# Patient Record
Sex: FEMALE | Race: WHITE | NOT HISPANIC OR LATINO | Employment: OTHER | URBAN - METROPOLITAN AREA
[De-identification: names, ages, dates, MRNs, and addresses within clinical notes are randomized per-mention and may not be internally consistent; named-entity substitution may affect disease eponyms.]

---

## 2018-12-12 ENCOUNTER — HOSPITAL ENCOUNTER (INPATIENT)
Facility: HOSPITAL | Age: 63
LOS: 1 days | Discharge: HOME | DRG: 287 | End: 2018-12-14
Attending: FAMILY MEDICINE | Admitting: INTERNAL MEDICINE
Payer: COMMERCIAL

## 2018-12-12 DIAGNOSIS — R07.9 CHEST PAIN: ICD-10-CM

## 2018-12-12 DIAGNOSIS — R79.89 ELEVATED TROPONIN: Primary | ICD-10-CM

## 2018-12-12 DIAGNOSIS — I42.0 CARDIOMYOPATHY, DILATED: ICD-10-CM

## 2018-12-12 PROBLEM — F31.9 BIPOLAR DISORDER: Chronic | Status: ACTIVE | Noted: 2018-12-12

## 2018-12-12 PROBLEM — E11.9 TYPE 2 DIABETES MELLITUS, WITHOUT LONG-TERM CURRENT USE OF INSULIN: Status: ACTIVE | Noted: 2018-12-12

## 2018-12-12 PROBLEM — E66.2 OBESITY HYPOVENTILATION SYNDROME: Chronic | Status: ACTIVE | Noted: 2018-12-12

## 2018-12-12 LAB
ALBUMIN SERPL BCP-MCNC: 3.7 G/DL
ALP SERPL-CCNC: 96 U/L
ALT SERPL W/O P-5'-P-CCNC: 15 U/L
ANION GAP SERPL CALC-SCNC: 12 MMOL/L
AST SERPL-CCNC: 19 U/L
BASOPHILS # BLD AUTO: 0.03 K/UL
BASOPHILS NFR BLD: 0.3 %
BILIRUB SERPL-MCNC: 0.3 MG/DL
BNP SERPL-MCNC: 45 PG/ML
BUN SERPL-MCNC: 15 MG/DL
CALCIUM SERPL-MCNC: 9.4 MG/DL
CHLORIDE SERPL-SCNC: 107 MMOL/L
CO2 SERPL-SCNC: 20 MMOL/L
CREAT SERPL-MCNC: 0.9 MG/DL
DIFFERENTIAL METHOD: ABNORMAL
EOSINOPHIL # BLD AUTO: 0.2 K/UL
EOSINOPHIL NFR BLD: 2.4 %
ERYTHROCYTE [DISTWIDTH] IN BLOOD BY AUTOMATED COUNT: 13.2 %
EST. GFR  (AFRICAN AMERICAN): >60 ML/MIN/1.73 M^2
EST. GFR  (NON AFRICAN AMERICAN): >60 ML/MIN/1.73 M^2
GLUCOSE SERPL-MCNC: 255 MG/DL
HCT VFR BLD AUTO: 36.8 %
HGB BLD-MCNC: 12.1 G/DL
LYMPHOCYTES # BLD AUTO: 1.7 K/UL
LYMPHOCYTES NFR BLD: 18.7 %
MCH RBC QN AUTO: 25.3 PG
MCHC RBC AUTO-ENTMCNC: 32.9 G/DL
MCV RBC AUTO: 77 FL
MONOCYTES # BLD AUTO: 0.6 K/UL
MONOCYTES NFR BLD: 6.9 %
NEUTROPHILS # BLD AUTO: 6.6 K/UL
NEUTROPHILS NFR BLD: 71.7 %
PLATELET # BLD AUTO: 269 K/UL
PMV BLD AUTO: 9.6 FL
POCT GLUCOSE: 232 MG/DL (ref 70–110)
POTASSIUM SERPL-SCNC: 3.9 MMOL/L
PROT SERPL-MCNC: 6.8 G/DL
RBC # BLD AUTO: 4.78 M/UL
SODIUM SERPL-SCNC: 139 MMOL/L
TROPONIN I SERPL DL<=0.01 NG/ML-MCNC: 0.2 NG/ML
WBC # BLD AUTO: 9.16 K/UL

## 2018-12-12 PROCEDURE — 93005 ELECTROCARDIOGRAM TRACING: CPT

## 2018-12-12 PROCEDURE — 93010 ELECTROCARDIOGRAM REPORT: CPT | Mod: ,,, | Performed by: INTERNAL MEDICINE

## 2018-12-12 PROCEDURE — 96372 THER/PROPH/DIAG INJ SC/IM: CPT

## 2018-12-12 PROCEDURE — 85025 COMPLETE CBC W/AUTO DIFF WBC: CPT

## 2018-12-12 PROCEDURE — 25000003 PHARM REV CODE 250: Performed by: FAMILY MEDICINE

## 2018-12-12 PROCEDURE — G0378 HOSPITAL OBSERVATION PER HR: HCPCS

## 2018-12-12 PROCEDURE — 63600175 PHARM REV CODE 636 W HCPCS: Performed by: PHYSICIAN ASSISTANT

## 2018-12-12 PROCEDURE — 99285 EMERGENCY DEPT VISIT HI MDM: CPT

## 2018-12-12 PROCEDURE — 83880 ASSAY OF NATRIURETIC PEPTIDE: CPT

## 2018-12-12 PROCEDURE — 80053 COMPREHEN METABOLIC PANEL: CPT

## 2018-12-12 PROCEDURE — 36415 COLL VENOUS BLD VENIPUNCTURE: CPT

## 2018-12-12 PROCEDURE — 83036 HEMOGLOBIN GLYCOSYLATED A1C: CPT

## 2018-12-12 PROCEDURE — 84484 ASSAY OF TROPONIN QUANT: CPT

## 2018-12-12 PROCEDURE — 25000003 PHARM REV CODE 250: Performed by: PHYSICIAN ASSISTANT

## 2018-12-12 RX ORDER — BUSPIRONE HYDROCHLORIDE 10 MG/1
10 TABLET ORAL 2 TIMES DAILY
COMMUNITY
Start: 2017-12-15

## 2018-12-12 RX ORDER — CARBAMAZEPINE 200 MG/1
200 TABLET ORAL NIGHTLY
COMMUNITY
Start: 2017-06-19

## 2018-12-12 RX ORDER — IBUPROFEN 200 MG
24 TABLET ORAL
Status: DISCONTINUED | OUTPATIENT
Start: 2018-12-12 | End: 2018-12-14 | Stop reason: HOSPADM

## 2018-12-12 RX ORDER — EZETIMIBE 10 MG/1
10 TABLET ORAL DAILY
Status: DISCONTINUED | OUTPATIENT
Start: 2018-12-13 | End: 2018-12-14 | Stop reason: HOSPADM

## 2018-12-12 RX ORDER — VITAMIN B COMPLEX
2500 TABLET ORAL DAILY
COMMUNITY

## 2018-12-12 RX ORDER — ONDANSETRON 8 MG/1
8 TABLET, ORALLY DISINTEGRATING ORAL EVERY 8 HOURS PRN
Status: DISCONTINUED | OUTPATIENT
Start: 2018-12-12 | End: 2018-12-14 | Stop reason: HOSPADM

## 2018-12-12 RX ORDER — IBUPROFEN 200 MG
16 TABLET ORAL
Status: DISCONTINUED | OUTPATIENT
Start: 2018-12-12 | End: 2018-12-14 | Stop reason: HOSPADM

## 2018-12-12 RX ORDER — ASPIRIN 81 MG/1
81 TABLET ORAL DAILY
Status: DISCONTINUED | OUTPATIENT
Start: 2018-12-12 | End: 2018-12-14 | Stop reason: HOSPADM

## 2018-12-12 RX ORDER — BUSPIRONE HYDROCHLORIDE 10 MG/1
10 TABLET ORAL 2 TIMES DAILY
Status: DISCONTINUED | OUTPATIENT
Start: 2018-12-12 | End: 2018-12-14 | Stop reason: HOSPADM

## 2018-12-12 RX ORDER — EZETIMIBE 10 MG/1
10 TABLET ORAL DAILY
COMMUNITY
Start: 2018-06-27 | End: 2019-01-08 | Stop reason: SDUPTHER

## 2018-12-12 RX ORDER — ATORVASTATIN CALCIUM 10 MG/1
10 TABLET, FILM COATED ORAL NIGHTLY
Status: DISCONTINUED | OUTPATIENT
Start: 2018-12-12 | End: 2018-12-13

## 2018-12-12 RX ORDER — GLUCAGON 1 MG
1 KIT INJECTION
Status: DISCONTINUED | OUTPATIENT
Start: 2018-12-12 | End: 2018-12-14 | Stop reason: HOSPADM

## 2018-12-12 RX ORDER — ACETAMINOPHEN 325 MG/1
650 TABLET ORAL EVERY 6 HOURS PRN
Status: DISCONTINUED | OUTPATIENT
Start: 2018-12-12 | End: 2018-12-14

## 2018-12-12 RX ORDER — HYDROCODONE BITARTRATE AND ACETAMINOPHEN 5; 325 MG/1; MG/1
1 TABLET ORAL EVERY 6 HOURS PRN
Status: DISCONTINUED | OUTPATIENT
Start: 2018-12-12 | End: 2018-12-14

## 2018-12-12 RX ORDER — INSULIN ASPART 100 [IU]/ML
0-5 INJECTION, SOLUTION INTRAVENOUS; SUBCUTANEOUS
Status: DISCONTINUED | OUTPATIENT
Start: 2018-12-12 | End: 2018-12-14 | Stop reason: HOSPADM

## 2018-12-12 RX ORDER — METOPROLOL TARTRATE 25 MG/1
12.5 TABLET ORAL 2 TIMES DAILY
Status: DISCONTINUED | OUTPATIENT
Start: 2018-12-12 | End: 2018-12-14 | Stop reason: HOSPADM

## 2018-12-12 RX ORDER — CARBAMAZEPINE 200 MG/1
200 TABLET ORAL NIGHTLY
Status: DISCONTINUED | OUTPATIENT
Start: 2018-12-12 | End: 2018-12-14 | Stop reason: HOSPADM

## 2018-12-12 RX ADMIN — BUSPIRONE HYDROCHLORIDE 10 MG: 10 TABLET ORAL at 09:12

## 2018-12-12 RX ADMIN — METOPROLOL TARTRATE 12.5 MG: 25 TABLET, FILM COATED ORAL at 09:12

## 2018-12-12 RX ADMIN — CARBAMAZEPINE 200 MG: 200 TABLET ORAL at 09:12

## 2018-12-12 RX ADMIN — LIDOCAINE HYDROCHLORIDE 50 ML: 20 SOLUTION ORAL; TOPICAL at 01:12

## 2018-12-12 RX ADMIN — ASPIRIN 81 MG: 81 TABLET, COATED ORAL at 06:12

## 2018-12-12 RX ADMIN — INSULIN ASPART 1 UNITS: 100 INJECTION, SOLUTION INTRAVENOUS; SUBCUTANEOUS at 09:12

## 2018-12-12 RX ADMIN — ATORVASTATIN CALCIUM 10 MG: 10 TABLET, FILM COATED ORAL at 09:12

## 2018-12-12 RX ADMIN — APIXABAN 5 MG: 2.5 TABLET, FILM COATED ORAL at 09:12

## 2018-12-12 NOTE — ASSESSMENT & PLAN NOTE
-Possibly due to increased oxygen demand and chronic hypoxia.    -Trend troponin.  -Check 2D echo.  -Start ASA, statin and metoprolol.  -Consider ACE, if blood pressure allows.    -Cardiology consult.

## 2018-12-12 NOTE — ASSESSMENT & PLAN NOTE
-Likely the cause of patient's chronic hypoxia.   -Oxygen as needed.   -Outpatient Pulmonology follow up.   -Educated on the importance of proper nutrition and caloric intake and the detrimental side effects of morbid obesity.

## 2018-12-12 NOTE — ED PROVIDER NOTES
SCRIBE #1 NOTE: I, Carolann Persaud, am scribing for, and in the presence of, Daiana Izaguirre MD. I have scribed the entire note.       History     Chief Complaint   Patient presents with    Chest Pain     pt c/o chest pain that started about an hour ago that radiates to her back     Review of patient's allergies indicates:  No Known Allergies      History of Present Illness     HPI    12/12/2018, 12:43 PM  History obtained from the patient      History of Present Illness: Keysha Dewey is a 63 y.o. female patient with a PMHx of Afib and HTN who presents to the Emergency Department for evaluation of CP which onset gradually 1 hr ago. Pt states pain radiates to back. Describes pain as squeezing. Pt reports sxs onset after getting upset with her sister. Symptoms are constant and moderate in severity. No mitigating or exacerbating factors reported. Associated sxs include SOB. Patient denies any fever, chills, diaphoresis, palpitations, extremity weakness/numbness, leg swelling, dizziness, cough, n/v, abd pain, and all other sxs at this time. No prior Tx. Pt reports normal stress test in August. No further complaints or concerns at this time.         Arrival mode: Personal vehicle     PCP: Primary Doctor No     Past Medical History:  History reviewed. No pertinent medical history.    Past Surgical History:  History reviewed. No pertinent surgical history.    Family History:  History reviewed. No pertinent family history.    Social History:  Social History Main Topics    Smoking status: Unknown if ever smoked    Smokeless tobacco: Unknown if ever used    Alcohol Use: Unknown drinking history    Drug Use: Unknown if ever used    Sexual Activity: Unknown          Review of Systems     Review of Systems   Constitutional: Negative for chills, diaphoresis and fever.   HENT: Negative for sore throat.    Respiratory: Positive for shortness of breath. Negative for cough.    Cardiovascular: Positive for chest pain (radiates  "to back). Negative for palpitations and leg swelling.   Gastrointestinal: Negative for abdominal pain, nausea and vomiting.   Genitourinary: Negative for dysuria.   Skin: Negative for rash.   Neurological: Negative for dizziness, weakness and numbness.   Hematological: Does not bruise/bleed easily.   All other systems reviewed and are negative.       Physical Exam     Initial Vitals [12/12/18 1233]   BP Pulse Resp Temp SpO2   (!) 157/96 (!) 114 18 98 °F (36.7 °C) 97 %      MAP       --          Physical Exam  Nursing Notes and Vital Signs Reviewed.  Constitutional: Patient is in no acute distress. Well-developed and well-nourished.  Head: Atraumatic. Normocephalic.  Eyes: PERRL. EOM intact. Conjunctivae are not pale. No scleral icterus.  ENT: Mucous membranes are moist. Oropharynx is clear and symmetric.    Neck: Supple. Full ROM. No lymphadenopathy.  Cardiovascular: Regular rate. Regular rhythm. No murmurs, rubs, or gallops. Distal pulses are 2+ and symmetric.  Pulmonary/Chest: No respiratory distress. Clear to auscultation bilaterally. No wheezing or rales.  Abdominal: Soft and non-distended.  There is no tenderness.  No rebound, guarding, or rigidity. Good bowel sounds.  Genitourinary: No CVA tenderness  Musculoskeletal: Moves all extremities. No obvious deformities. No edema. No calf tenderness.  Skin: Warm and dry.  Neurological:  Alert, awake, and appropriate.  Normal speech.  No acute focal neurological deficits are appreciated.  Psychiatric: Normal affect. Good eye contact. Appropriate in content.     ED Course   Procedures  ED Vital Signs:  Vitals:    12/12/18 1233   BP: (!) 157/96   Pulse: (!) 114   Resp: 18   Temp: 98 °F (36.7 °C)   TempSrc: Oral   SpO2: 97%   Weight: 114.5 kg (252 lb 8.6 oz)   Height: 5' 4" (1.626 m)       Abnormal Lab Results:  Labs Reviewed   CBC W/ AUTO DIFFERENTIAL - Abnormal; Notable for the following components:       Result Value    Hematocrit 36.8 (*)     MCV 77 (*)     MCH 25.3 " (*)     All other components within normal limits   COMPREHENSIVE METABOLIC PANEL - Abnormal; Notable for the following components:    CO2 20 (*)     Glucose 255 (*)     All other components within normal limits   TROPONIN I - Abnormal; Notable for the following components:    Troponin I 0.202 (*)     All other components within normal limits   B-TYPE NATRIURETIC PEPTIDE   URINALYSIS        All Lab Results:  Results for orders placed or performed during the hospital encounter of 12/12/18   CBC auto differential   Result Value Ref Range    WBC 9.16 3.90 - 12.70 K/uL    RBC 4.78 4.00 - 5.40 M/uL    Hemoglobin 12.1 12.0 - 16.0 g/dL    Hematocrit 36.8 (L) 37.0 - 48.5 %    MCV 77 (L) 82 - 98 fL    MCH 25.3 (L) 27.0 - 31.0 pg    MCHC 32.9 32.0 - 36.0 g/dL    RDW 13.2 11.5 - 14.5 %    Platelets 269 150 - 350 K/uL    MPV 9.6 9.2 - 12.9 fL    Gran # (ANC) 6.6 1.8 - 7.7 K/uL    Lymph # 1.7 1.0 - 4.8 K/uL    Mono # 0.6 0.3 - 1.0 K/uL    Eos # 0.2 0.0 - 0.5 K/uL    Baso # 0.03 0.00 - 0.20 K/uL    Gran% 71.7 38.0 - 73.0 %    Lymph% 18.7 18.0 - 48.0 %    Mono% 6.9 4.0 - 15.0 %    Eosinophil% 2.4 0.0 - 8.0 %    Basophil% 0.3 0.0 - 1.9 %    Differential Method Automated    Comprehensive metabolic panel   Result Value Ref Range    Sodium 139 136 - 145 mmol/L    Potassium 3.9 3.5 - 5.1 mmol/L    Chloride 107 95 - 110 mmol/L    CO2 20 (L) 23 - 29 mmol/L    Glucose 255 (H) 70 - 110 mg/dL    BUN, Bld 15 8 - 23 mg/dL    Creatinine 0.9 0.5 - 1.4 mg/dL    Calcium 9.4 8.7 - 10.5 mg/dL    Total Protein 6.8 6.0 - 8.4 g/dL    Albumin 3.7 3.5 - 5.2 g/dL    Total Bilirubin 0.3 0.1 - 1.0 mg/dL    Alkaline Phosphatase 96 55 - 135 U/L    AST 19 10 - 40 U/L    ALT 15 10 - 44 U/L    Anion Gap 12 8 - 16 mmol/L    eGFR if African American >60 >60 mL/min/1.73 m^2    eGFR if non African American >60 >60 mL/min/1.73 m^2   B-Type natriuretic peptide (BNP)   Result Value Ref Range    BNP 45 0 - 99 pg/mL   Troponin I   Result Value Ref Range    Troponin I  0.202 (H) 0.000 - 0.026 ng/mL         Imaging Results:  Imaging Results          X-Ray Chest AP Portable (Final result)  Result time 12/12/18 13:12:22    Final result by WAYNE Flynn Sr., MD (12/12/18 13:12:22)                 Impression:      1. The lungs are clear.  2. There are mild osteoarthritic changes in the right acromioclavicular joint.  .      Electronically signed by: Bharathi Flynn MD  Date:    12/12/2018  Time:    13:12             Narrative:    EXAMINATION:  XR CHEST AP PORTABLE    CLINICAL HISTORY:  Chest Pain;    COMPARISON:  08/17/2016    FINDINGS:  The size of the heart is normal. The lungs are clear. There is no pneumothorax.  The costophrenic angles are sharp.  There are mild osteoarthritic changes in the right acromioclavicular joint.                                 The EKG was ordered, reviewed, and independently interpreted by the ED provider.  Interpretation time: 1244  Rate: 92 BPM   Rhythm: normal sinus rhythm  Interpretation: Low voltage QRS. Borderline ECG. No STEMI.           The Emergency Provider reviewed the vital signs and test results, which are outlined above.     ED Discussion     2:15 PM: Dr. Izaguirre discussed the pt's case with Dr. Smith (Cardiology) who recommends admission to hospital medicine.    2:39 PM: Discussed case with ИВАН Pearce (Hospital Medicine). Dr. Grimaldo agrees with current care and management of pt and accepts admission.   Admitting Service: Hospital medicine   Admitting Physician: Dr. Grimaldo  Admit to: Tele Obs    2:43 PM: Re-evaluated pt. I have discussed test results, shared treatment plan, and the need for admission with patient and family at bedside. Pt and family express understanding at this time and agree with all information. All questions answered. Pt and family have no further questions or concerns at this time. Pt is ready for admit.        ED Medication(s):  Medications   GI cocktail (mylanta 30 mL, lidocaine 2 % viscous 10 mL,  dicyclomine 10 mL) 50 mL (50 mLs Oral Given 12/12/18 4247)            Medical Decision Making:   Clinical Tests:   Lab Tests: Ordered and Reviewed  Radiological Study: Ordered and Reviewed  Medical Tests: Ordered and Reviewed             Scribe Attestation:   Scribe #1: I performed the above scribed service and the documentation accurately describes the services I performed. I attest to the accuracy of the note.     Attending:   Physician Attestation Statement for Scribe #1: I, Daiana Izaguirre MD, personally performed the services described in this documentation, as scribed by Carolann Persaud, in my presence, and it is both accurate and complete.           Clinical Impression       ICD-10-CM ICD-9-CM   1. Chest pain R07.9 786.50       Disposition:   Disposition: Placed in Observation  Condition: Fair         Daiana Izaguirre MD  12/13/18 1007

## 2018-12-12 NOTE — SUBJECTIVE & OBJECTIVE
Past Medical History:   Diagnosis Date    Atrial fibrillation     Bipolar disorder     DM w/o complication type II        Past Surgical History:   Procedure Laterality Date    GASTRIC BYPASS  2006    HYSTERECTOMY  2002       Review of patient's allergies indicates:  No Known Allergies    No current facility-administered medications on file prior to encounter.      Current Outpatient Medications on File Prior to Encounter   Medication Sig    apixaban (ELIQUIS) 5 mg Tab Take 5 mg by mouth 2 (two) times daily.    busPIRone (BUSPAR) 10 MG tablet Take 10 mg by mouth 2 (two) times daily.    carBAMazepine (TEGRETOL) 200 mg tablet Take 200 mg by mouth every evening.    cyanocobalamin, vitamin B-12, (VITAMIN B-12) 2,500 mcg Subl Place 2,500 mcg under the tongue once daily.    ezetimibe (ZETIA) 10 mg tablet Take 10 mg by mouth once daily.    SITagliptin (JANUVIA) 100 MG Tab Take 100 mg by mouth once daily.     Family History     Reviewed and not pertinent.         Tobacco Use    Smoking status: Never Smoker   Substance and Sexual Activity    Alcohol use: No    Drug use: No    Sexual activity: Not on file     Review of Systems   Constitutional: Negative for appetite change, chills, diaphoresis, fatigue and fever.   HENT: Negative for congestion, ear pain, mouth sores, sore throat and trouble swallowing.    Eyes: Negative for pain and visual disturbance.   Respiratory: Positive for shortness of breath. Negative for cough and chest tightness.    Cardiovascular: Positive for chest pain. Negative for palpitations and leg swelling.   Gastrointestinal: Negative for abdominal pain, constipation and nausea.   Endocrine: Negative for cold intolerance, heat intolerance, polydipsia and polyuria.   Genitourinary: Negative for dysuria, frequency and hematuria.   Musculoskeletal: Positive for back pain. Negative for arthralgias, myalgias and neck pain.   Skin: Negative for pallor, rash and wound.   Allergic/Immunologic:  Negative for environmental allergies and immunocompromised state.   Neurological: Negative for dizziness, seizures, syncope, weakness, numbness and headaches.   Hematological: Negative for adenopathy. Does not bruise/bleed easily.   Psychiatric/Behavioral: Negative for agitation, confusion and sleep disturbance.     Objective:     Vital Signs (Most Recent):  Temp: 98 °F (36.7 °C) (12/12/18 1233)  Pulse: 102 (12/12/18 1500)  Resp: 18 (12/12/18 1500)  BP: 131/80 (12/12/18 1500)  SpO2: 98 % (12/12/18 1500) Vital Signs (24h Range):  Temp:  [98 °F (36.7 °C)] 98 °F (36.7 °C)  Pulse:  [] 102  Resp:  [16-18] 18  SpO2:  [95 %-98 %] 98 %  BP: (130-157)/(76-96) 131/80     Weight: 114.5 kg (252 lb 8.6 oz)  Body mass index is 43.35 kg/m².    Physical Exam   Constitutional: She is oriented to person, place, and time. She appears well-developed and well-nourished. No distress.   HENT:   Head: Normocephalic and atraumatic.   Eyes: Conjunctivae are normal.   PERRL   Neck: Neck supple. No JVD present.   Cardiovascular: Normal rate, regular rhythm and normal heart sounds.   Pulmonary/Chest: Effort normal. She has decreased breath sounds in the right lower field and the left lower field.   Abdominal: Soft. Bowel sounds are normal. She exhibits no distension. There is no tenderness.   Musculoskeletal: Normal range of motion. She exhibits edema.   Neurological: She is alert and oriented to person, place, and time.   Skin: Skin is warm and dry.   Psychiatric: She has a normal mood and affect. Her behavior is normal. Thought content normal.   Nursing note and vitals reviewed.          Significant Labs:   CBC:   Recent Labs   Lab 12/12/18  1236   WBC 9.16   HGB 12.1   HCT 36.8*        CMP:   Recent Labs   Lab 12/12/18  1236      K 3.9      CO2 20*   *   BUN 15   CREATININE 0.9   CALCIUM 9.4   PROT 6.8   ALBUMIN 3.7   BILITOT 0.3   ALKPHOS 96   AST 19   ALT 15   ANIONGAP 12   EGFRNONAA >60     All pertinent  labs within the past 24 hours have been reviewed.    Significant Imaging: I have reviewed all pertinent imaging results/findings within the past 24 hours.   Imaging Results          X-Ray Chest AP Portable (Final result)  Result time 12/12/18 13:12:22    Final result by WAYNE Flynn Sr., MD (12/12/18 13:12:22)                 Impression:      1. The lungs are clear.  2. There are mild osteoarthritic changes in the right acromioclavicular joint.  .      Electronically signed by: Bharathi Flynn MD  Date:    12/12/2018  Time:    13:12             Narrative:    EXAMINATION:  XR CHEST AP PORTABLE    CLINICAL HISTORY:  Chest Pain;    COMPARISON:  08/17/2016    FINDINGS:  The size of the heart is normal. The lungs are clear. There is no pneumothorax.  The costophrenic angles are sharp.  There are mild osteoarthritic changes in the right acromioclavicular joint.

## 2018-12-12 NOTE — H&P
[facility] Hospital Medicine  History & Physical    Patient Name: Keysha Dewey  MRN: [id number]  Admission Date: 12/12/2018  Attending Physician: Orion Grimaldo MD   Primary Care Provider: Tito Carbone MD         Patient information was obtained from patient, past medical records and ER records.     Subjective:     Principal Problem:Elevated troponin    Chief Complaint:   Chief Complaint   Patient presents with    Chest Pain     pt c/o chest pain that started about an hour ago that radiates to her back        HPI: Keysha Dewey is a 63 year old female with atrial fibrillation on Eliquis, DM II and bipolar disorder who presented to the ED with complaints of substernal chest pain. The pain began during an argument with her sister. It radiated into her back and both arms became weak.  There was associated SOB. The patient denies nausea and diaphoresis. She denies prior history of chest pain, but sees Dr. Echavarria at  Cardiology. She reports having a negative stress test on 8/29/18. These results were confirmed with obtained records from Dr. Echavarria. Work up in the ED was essentially negative with the exception persistent oxygen saturations in the low 90s.     Past Medical History:   Diagnosis Date    Atrial fibrillation     Bipolar disorder     DM w/o complication type II        Past Surgical History:   Procedure Laterality Date    GASTRIC BYPASS  2006    HYSTERECTOMY  2002       Review of patient's allergies indicates:  No Known Allergies    No current facility-administered medications on file prior to encounter.      Current Outpatient Medications on File Prior to Encounter   Medication Sig    apixaban (ELIQUIS) 5 mg Tab Take 5 mg by mouth 2 (two) times daily.    busPIRone (BUSPAR) 10 MG tablet Take 10 mg by mouth 2 (two) times daily.    carBAMazepine (TEGRETOL) 200 mg tablet Take 200 mg by mouth every evening.    cyanocobalamin, vitamin B-12, (VITAMIN B-12) 2,500 mcg Subl Place 2,500 mcg  under the tongue once daily.    ezetimibe (ZETIA) 10 mg tablet Take 10 mg by mouth once daily.    SITagliptin (JANUVIA) 100 MG Tab Take 100 mg by mouth once daily.     Family History     Reviewed and not pertinent.         Tobacco Use    Smoking status: Never Smoker   Substance and Sexual Activity    Alcohol use: No    Drug use: No    Sexual activity: Not on file     Review of Systems   Constitutional: Negative for appetite change, chills, diaphoresis, fatigue and fever.   HENT: Negative for congestion, ear pain, mouth sores, sore throat and trouble swallowing.    Eyes: Negative for pain and visual disturbance.   Respiratory: Positive for shortness of breath. Negative for cough and chest tightness.    Cardiovascular: Positive for chest pain. Negative for palpitations and leg swelling.   Gastrointestinal: Negative for abdominal pain, constipation and nausea.   Endocrine: Negative for cold intolerance, heat intolerance, polydipsia and polyuria.   Genitourinary: Negative for dysuria, frequency and hematuria.   Musculoskeletal: Positive for back pain. Negative for arthralgias, myalgias and neck pain.   Skin: Negative for pallor, rash and wound.   Allergic/Immunologic: Negative for environmental allergies and immunocompromised state.   Neurological: Negative for dizziness, seizures, syncope, weakness, numbness and headaches.   Hematological: Negative for adenopathy. Does not bruise/bleed easily.   Psychiatric/Behavioral: Negative for agitation, confusion and sleep disturbance.     Objective:     Vital Signs (Most Recent):  Temp: 98 °F (36.7 °C) (12/12/18 1233)  Pulse: 102 (12/12/18 1500)  Resp: 18 (12/12/18 1500)  BP: 131/80 (12/12/18 1500)  SpO2: 98 % (12/12/18 1500) Vital Signs (24h Range):  Temp:  [98 °F (36.7 °C)] 98 °F (36.7 °C)  Pulse:  [] 102  Resp:  [16-18] 18  SpO2:  [95 %-98 %] 98 %  BP: (130-157)/(76-96) 131/80     Weight: 114.5 kg (252 lb 8.6 oz)  Body mass index is 43.35 kg/m².    Physical Exam    Constitutional: She is oriented to person, place, and time. She appears well-developed and well-nourished. No distress.   HENT:   Head: Normocephalic and atraumatic.   Eyes: Conjunctivae are normal.   PERRL   Neck: Neck supple. No JVD present.   Cardiovascular: Normal rate, regular rhythm and normal heart sounds.   Pulmonary/Chest: Effort normal. She has decreased breath sounds in the right lower field and the left lower field.   Abdominal: Soft. Bowel sounds are normal. She exhibits no distension. There is no tenderness.   Musculoskeletal: Normal range of motion. She exhibits edema.   Neurological: She is alert and oriented to person, place, and time.   Skin: Skin is warm and dry.   Psychiatric: She has a normal mood and affect. Her behavior is normal. Thought content normal.   Nursing note and vitals reviewed.          Significant Labs:   CBC:   Recent Labs   Lab 12/12/18  1236   WBC 9.16   HGB 12.1   HCT 36.8*        CMP:   Recent Labs   Lab 12/12/18  1236      K 3.9      CO2 20*   *   BUN 15   CREATININE 0.9   CALCIUM 9.4   PROT 6.8   ALBUMIN 3.7   BILITOT 0.3   ALKPHOS 96   AST 19   ALT 15   ANIONGAP 12   EGFRNONAA >60     All pertinent labs within the past 24 hours have been reviewed.    Significant Imaging: I have reviewed all pertinent imaging results/findings within the past 24 hours.   Imaging Results          X-Ray Chest AP Portable (Final result)  Result time 12/12/18 13:12:22    Final result by WAYNE Flynn Sr., MD (12/12/18 13:12:22)                 Impression:      1. The lungs are clear.  2. There are mild osteoarthritic changes in the right acromioclavicular joint.  .      Electronically signed by: Bharathi Flynn MD  Date:    12/12/2018  Time:    13:12             Narrative:    EXAMINATION:  XR CHEST AP PORTABLE    CLINICAL HISTORY:  Chest Pain;    COMPARISON:  08/17/2016    FINDINGS:  The size of the heart is normal. The lungs are clear. There is no pneumothorax.   The costophrenic angles are sharp.  There are mild osteoarthritic changes in the right acromioclavicular joint.                                  Assessment/Plan:     * Elevated troponin    -Possibly due to increased oxygen demand and chronic hypoxia.    -Trend troponin.  -Check 2D echo.  -Start ASA, statin and metoprolol.  -Consider ACE, if blood pressure allows.    -Cardiology consult.       Obesity hypoventilation syndrome    -Likely the cause of patient's chronic hypoxia.   -Oxygen as needed.   -Outpatient Pulmonology follow up.   -Educated on the importance of proper nutrition and caloric intake and the detrimental side effects of morbid obesity.          Bipolar disorder    Continue buspirone and carbamazepine.          Type 2 diabetes mellitus, without long-term current use of insulin    -NISS  -Hold Januvia  -ADA diet.  -Check Hemoglobin A1C.         VTE Risk Mitigation (From admission, onward)        Ordered     apixaban tablet 5 mg  2 times daily      12/12/18 7887             ИВАН Crawford  Department of Hospital Medicine   [facility]

## 2018-12-13 PROBLEM — I20.0 ANGINA PECTORIS, UNSTABLE: Status: ACTIVE | Noted: 2018-12-13

## 2018-12-13 PROBLEM — R07.9 CHEST PAIN: Status: ACTIVE | Noted: 2018-12-13

## 2018-12-13 LAB
ANION GAP SERPL CALC-SCNC: 8 MMOL/L
ANISOCYTOSIS BLD QL SMEAR: SLIGHT
APTT BLDCRRT: 27.9 SEC
APTT BLDCRRT: 31.4 SEC
BASOPHILS # BLD AUTO: 0.05 K/UL
BASOPHILS NFR BLD: 0.5 %
BUN SERPL-MCNC: 13 MG/DL
CALCIUM SERPL-MCNC: 8.9 MG/DL
CHLORIDE SERPL-SCNC: 111 MMOL/L
CHOLEST SERPL-MCNC: 173 MG/DL
CHOLEST/HDLC SERPL: 4.1 {RATIO}
CO2 SERPL-SCNC: 18 MMOL/L
CREAT SERPL-MCNC: 0.9 MG/DL
DIASTOLIC DYSFUNCTION: YES
DIFFERENTIAL METHOD: ABNORMAL
EOSINOPHIL # BLD AUTO: 0.1 K/UL
EOSINOPHIL NFR BLD: 0.6 %
ERYTHROCYTE [DISTWIDTH] IN BLOOD BY AUTOMATED COUNT: 13.8 %
EST. GFR  (AFRICAN AMERICAN): >60 ML/MIN/1.73 M^2
EST. GFR  (NON AFRICAN AMERICAN): >60 ML/MIN/1.73 M^2
ESTIMATED AVG GLUCOSE: 217 MG/DL
ESTIMATED PA SYSTOLIC PRESSURE: 35.05
GLUCOSE SERPL-MCNC: 237 MG/DL
HBA1C MFR BLD HPLC: 9.2 %
HCT VFR BLD AUTO: 35.2 %
HDLC SERPL-MCNC: 42 MG/DL
HDLC SERPL: 24.3 %
HGB BLD-MCNC: 11 G/DL
INR PPP: 1
LDLC SERPL CALC-MCNC: 100.4 MG/DL
LYMPHOCYTES # BLD AUTO: 1.9 K/UL
LYMPHOCYTES NFR BLD: 18.1 %
MAGNESIUM SERPL-MCNC: 2.1 MG/DL
MCH RBC QN AUTO: 25 PG
MCHC RBC AUTO-ENTMCNC: 31.3 G/DL
MCV RBC AUTO: 80 FL
MONOCYTES # BLD AUTO: 0.5 K/UL
MONOCYTES NFR BLD: 5 %
NEUTROPHILS # BLD AUTO: 7.9 K/UL
NEUTROPHILS NFR BLD: 76.2 %
NONHDLC SERPL-MCNC: 131 MG/DL
OVALOCYTES BLD QL SMEAR: ABNORMAL
PHOSPHATE SERPL-MCNC: 2.9 MG/DL
PLATELET # BLD AUTO: 213 K/UL
PLATELET BLD QL SMEAR: ABNORMAL
PMV BLD AUTO: 9.7 FL
POCT GLUCOSE: 179 MG/DL (ref 70–110)
POCT GLUCOSE: 206 MG/DL (ref 70–110)
POCT GLUCOSE: 237 MG/DL (ref 70–110)
POCT GLUCOSE: 252 MG/DL (ref 70–110)
POIKILOCYTOSIS BLD QL SMEAR: SLIGHT
POTASSIUM SERPL-SCNC: 4.4 MMOL/L
PROTHROMBIN TIME: 10.9 SEC
RBC # BLD AUTO: 4.4 M/UL
RETIRED EF AND QEF - SEE NOTES: 35 (ref 55–65)
SODIUM SERPL-SCNC: 137 MMOL/L
TRIGL SERPL-MCNC: 153 MG/DL
TROPONIN I SERPL DL<=0.01 NG/ML-MCNC: 1.01 NG/ML
TROPONIN I SERPL DL<=0.01 NG/ML-MCNC: 1.09 NG/ML
TROPONIN I SERPL DL<=0.01 NG/ML-MCNC: 1.11 NG/ML
WBC # BLD AUTO: 10.46 K/UL

## 2018-12-13 PROCEDURE — 84484 ASSAY OF TROPONIN QUANT: CPT

## 2018-12-13 PROCEDURE — 80048 BASIC METABOLIC PNL TOTAL CA: CPT

## 2018-12-13 PROCEDURE — 85730 THROMBOPLASTIN TIME PARTIAL: CPT | Mod: 91

## 2018-12-13 PROCEDURE — 36415 COLL VENOUS BLD VENIPUNCTURE: CPT

## 2018-12-13 PROCEDURE — 85025 COMPLETE CBC W/AUTO DIFF WBC: CPT

## 2018-12-13 PROCEDURE — 93306 TTE W/DOPPLER COMPLETE: CPT

## 2018-12-13 PROCEDURE — 83735 ASSAY OF MAGNESIUM: CPT

## 2018-12-13 PROCEDURE — 21400001 HC TELEMETRY ROOM

## 2018-12-13 PROCEDURE — 85730 THROMBOPLASTIN TIME PARTIAL: CPT

## 2018-12-13 PROCEDURE — 96372 THER/PROPH/DIAG INJ SC/IM: CPT

## 2018-12-13 PROCEDURE — 85610 PROTHROMBIN TIME: CPT

## 2018-12-13 PROCEDURE — 99222 1ST HOSP IP/OBS MODERATE 55: CPT | Mod: ,,, | Performed by: INTERNAL MEDICINE

## 2018-12-13 PROCEDURE — 93306 TTE W/DOPPLER COMPLETE: CPT | Mod: 26,,, | Performed by: INTERNAL MEDICINE

## 2018-12-13 PROCEDURE — 84100 ASSAY OF PHOSPHORUS: CPT

## 2018-12-13 PROCEDURE — 63600175 PHARM REV CODE 636 W HCPCS: Performed by: NURSE PRACTITIONER

## 2018-12-13 PROCEDURE — 80061 LIPID PANEL: CPT

## 2018-12-13 PROCEDURE — 25000003 PHARM REV CODE 250: Performed by: PHYSICIAN ASSISTANT

## 2018-12-13 PROCEDURE — 84484 ASSAY OF TROPONIN QUANT: CPT | Mod: 91

## 2018-12-13 RX ORDER — SODIUM CHLORIDE 9 MG/ML
INJECTION, SOLUTION INTRAVENOUS CONTINUOUS
Status: DISCONTINUED | OUTPATIENT
Start: 2018-12-14 | End: 2018-12-14

## 2018-12-13 RX ORDER — HEPARIN SODIUM,PORCINE/D5W 25000/250
12 INTRAVENOUS SOLUTION INTRAVENOUS CONTINUOUS
Status: DISCONTINUED | OUTPATIENT
Start: 2018-12-13 | End: 2018-12-14

## 2018-12-13 RX ADMIN — BUSPIRONE HYDROCHLORIDE 10 MG: 10 TABLET ORAL at 09:12

## 2018-12-13 RX ADMIN — ASPIRIN 81 MG: 81 TABLET, COATED ORAL at 09:12

## 2018-12-13 RX ADMIN — INSULIN ASPART 2 UNITS: 100 INJECTION, SOLUTION INTRAVENOUS; SUBCUTANEOUS at 05:12

## 2018-12-13 RX ADMIN — INSULIN ASPART 1 UNITS: 100 INJECTION, SOLUTION INTRAVENOUS; SUBCUTANEOUS at 08:12

## 2018-12-13 RX ADMIN — METOPROLOL TARTRATE 12.5 MG: 25 TABLET, FILM COATED ORAL at 08:12

## 2018-12-13 RX ADMIN — EZETIMIBE 10 MG: 10 TABLET ORAL at 09:12

## 2018-12-13 RX ADMIN — HEPARIN SODIUM AND DEXTROSE 12 UNITS/KG/HR: 10000; 5 INJECTION INTRAVENOUS at 12:12

## 2018-12-13 RX ADMIN — METOPROLOL TARTRATE 12.5 MG: 25 TABLET, FILM COATED ORAL at 09:12

## 2018-12-13 RX ADMIN — BUSPIRONE HYDROCHLORIDE 10 MG: 10 TABLET ORAL at 08:12

## 2018-12-13 RX ADMIN — CARBAMAZEPINE 200 MG: 200 TABLET ORAL at 08:12

## 2018-12-13 RX ADMIN — APIXABAN 5 MG: 2.5 TABLET, FILM COATED ORAL at 09:12

## 2018-12-13 NOTE — ASSESSMENT & PLAN NOTE
Initial troponin 0.202  Trend troponin 0.202>1.111>1.087  IV heparin gtt started this AM without bolus  Hold Eliquis for now  Continue ASA, IV heparin gtt, Zetia, BB  No ACEi/ARB for now given BP on low side  No statin due to intolerance in the past  FLP pending  TTE pending  Recent stress test negative per Dr. Echavarria, records in file  Further recs to follow

## 2018-12-13 NOTE — ASSESSMENT & PLAN NOTE
-[facility] planned 12/14/18.   -Continue to trend troponin.  -2D echocardiogram results pending.  -Continue ASA and metoprolol.  -Decision regarding addition of second antiplatelet agent pending [facility] results.   -Statin held due to adverse reaction. ACE held due to hypotension.   -The patient was evaluated for cardiac rehab and is not a candidate at this time. She will be referred, if appropriate.     -Cardiology following.

## 2018-12-13 NOTE — CONSULTS
[facility] -   Cardiology  Consult Note    Patient Name: Keysha Dewey  MRN: [id number]  Admission Date: 12/12/2018  Hospital Length of Stay: 0 days  Code Status: Full Code   Attending Provider: Orion Grimaldo MD   Consulting Provider: Ngoc Reno NP  Primary Care Physician: Tito Carbone MD  Principal Problem:Elevated troponin    Patient information was obtained from patient, spouse/SO, past medical records and ER records.     Inpatient consult to Cardiology  Consult performed by: Ngoc Reno NP  Consult ordered by: ИВАН Crawford        Subjective:     Chief Complaint:  Chest pain     HPI:   Mrs. Dewey is a 63 year old female with PMHx of AFIB on Eliquis, HTN, HLP, DM who presented to [facility] yesterday due to chest pain after argument with her sister. She reports that chest pain did radiate to her back and both arms and experienced episode of weakness at that time with associated shortness of breath. Primary cardiologist is Dr. Echavarria at  Cardiology and had recent negative stress test. ED workup revealed decreased O2 saturation in 90s., initial troponin 0.202. Cardiology consulted to assist with management. Patient seen and examined in room this AM. Denies chest pain or anginal equivalents on exam today. No shortness of breath, COHN or palpitations. Denies orthopnea, PND or abdominal bloating. She does endorse having issues with vomiting post food intake and acid reflux since gastric bypass surgery which has been ongoing for some time per patient report. TTE pending. Repeat troponin 1.111, IV heparin gtt started without bolus and Eliquis on hold for now. Will continue to trend serial troponins until peaked. Further recs to follow.    Past Medical History:   Diagnosis Date    Atrial fibrillation     Bipolar disorder     DM w/o complication type II        Past Surgical History:   Procedure Laterality Date    GASTRIC BYPASS  2006    HYSTERECTOMY  2002       Review of patient's  allergies indicates:   Allergen Reactions    Lipitor [atorvastatin]        No current facility-administered medications on file prior to encounter.      Current Outpatient Medications on File Prior to Encounter   Medication Sig    apixaban (ELIQUIS) 5 mg Tab Take 5 mg by mouth 2 (two) times daily.    busPIRone (BUSPAR) 10 MG tablet Take 10 mg by mouth 2 (two) times daily.    carBAMazepine (TEGRETOL) 200 mg tablet Take 200 mg by mouth every evening.    cyanocobalamin, vitamin B-12, (VITAMIN B-12) 2,500 mcg Subl Place 2,500 mcg under the tongue once daily.    ezetimibe (ZETIA) 10 mg tablet Take 10 mg by mouth once daily.    SITagliptin (JANUVIA) 100 MG Tab Take 100 mg by mouth once daily.     Family History     None        Tobacco Use    Smoking status: Never Smoker   Substance and Sexual Activity    Alcohol use: No    Drug use: No    Sexual activity: Not on file     Review of Systems   Constitution: Positive for malaise/fatigue. Negative for weakness.   HENT: Negative for hearing loss and hoarse voice.    Eyes: Negative for blurred vision and visual disturbance.   Cardiovascular: Positive for chest pain (resolved). Negative for claudication, dyspnea on exertion, irregular heartbeat, leg swelling, near-syncope, orthopnea, palpitations, paroxysmal nocturnal dyspnea and syncope.   Respiratory: Positive for shortness of breath (resolved). Negative for cough, hemoptysis, sleep disturbances due to breathing, snoring and wheezing.    Endocrine: Negative for cold intolerance and heat intolerance.   Hematologic/Lymphatic: Bruises/bleeds easily.   Skin: Negative for color change, dry skin and nail changes.   Musculoskeletal: Positive for arthritis. Negative for back pain, joint pain and myalgias.   Gastrointestinal: Positive for vomiting (intermittent post food intake). Negative for bloating, abdominal pain, constipation and nausea.   Genitourinary: Negative for dysuria, flank pain, hematuria and hesitancy.    Neurological: Negative for headaches, light-headedness, loss of balance, numbness and paresthesias.   Psychiatric/Behavioral: Negative for altered mental status.   Allergic/Immunologic: Negative for environmental allergies.     Objective:     Vital Signs (Most Recent):  Temp: 98.6 °F (37 °C) (12/13/18 1208)  Pulse: 87 (12/13/18 1208)  Resp: 18 (12/13/18 1208)  BP: 129/73 (12/13/18 1208)  SpO2: 95 % (12/13/18 1208) Vital Signs (24h Range):  Temp:  [97.7 °F (36.5 °C)-98.6 °F (37 °C)] 98.6 °F (37 °C)  Pulse:  [] 87  Resp:  [16-18] 18  SpO2:  [93 %-98 %] 95 %  BP: (106-131)/(56-80) 129/73     Weight: 114.5 kg (252 lb 6.8 oz)  Body mass index is 43.33 kg/m².    SpO2: 95 %  O2 Device (Oxygen Therapy): room air    No intake or output data in the 24 hours ending 12/13/18 1251    Lines/Drains/Airways     Peripheral Intravenous Line                 Peripheral IV - Single Lumen 12/12/18 1248 Left Antecubital 1 day                Physical Exam   Constitutional: She is oriented to person, place, and time. She appears well-developed and well-nourished. No distress.   HENT:   Head: Normocephalic and atraumatic.   Eyes: Pupils are equal, round, and reactive to light.   Neck: Normal range of motion and full passive range of motion without pain. Neck supple. No JVD present.   Cardiovascular: Normal rate, regular rhythm, S1 normal, S2 normal and intact distal pulses. PMI is not displaced. Exam reveals no distant heart sounds.   No murmur heard.  Pulses:       Radial pulses are 2+ on the right side, and 2+ on the left side.        Dorsalis pedis pulses are 2+ on the right side, and 2+ on the left side.   Pulmonary/Chest: Effort normal and breath sounds normal. No accessory muscle usage. No respiratory distress. She has no decreased breath sounds. She has no wheezes. She has no rales.   Abdominal: Soft. Bowel sounds are normal. She exhibits no distension. There is no tenderness.   obese   Musculoskeletal: Normal range of motion.  She exhibits no edema.        Right ankle: She exhibits no swelling.        Left ankle: She exhibits no swelling.   Neurological: She is alert and oriented to person, place, and time.   Skin: Skin is warm and dry. She is not diaphoretic. No cyanosis. Nails show no clubbing.   Psychiatric: She has a normal mood and affect. Her speech is normal and behavior is normal. Judgment and thought content normal. Cognition and memory are normal.   Nursing note and vitals reviewed.      Significant Labs:   BMP:   Recent Labs   Lab 12/12/18  1236 12/13/18  0543   * 237*    137   K 3.9 4.4    111*   CO2 20* 18*   BUN 15 13   CREATININE 0.9 0.9   CALCIUM 9.4 8.9   MG  --  2.1   , CMP   Recent Labs   Lab 12/12/18  1236 12/13/18  0543    137   K 3.9 4.4    111*   CO2 20* 18*   * 237*   BUN 15 13   CREATININE 0.9 0.9   CALCIUM 9.4 8.9   PROT 6.8  --    ALBUMIN 3.7  --    BILITOT 0.3  --    ALKPHOS 96  --    AST 19  --    ALT 15  --    ANIONGAP 12 8   ESTGFRAFRICA >60 >60   EGFRNONAA >60 >60   , CBC   Recent Labs   Lab 12/12/18  1236 12/13/18  0543   WBC 9.16 10.46   HGB 12.1 11.0*   HCT 36.8* 35.2*    213   , Lipid Panel No results for input(s): CHOL, HDL, LDLCALC, TRIG, CHOLHDL in the last 48 hours., Troponin   Recent Labs   Lab 12/12/18  1236 12/13/18  0854 12/13/18  1025   TROPONINI 0.202* 1.111* 1.087*    and All pertinent lab results from the last 24 hours have been reviewed.    Significant Imaging: Echocardiogram: 2D echo with color flow doppler: No results found for this or any previous visit. and X-Ray: CXR: X-Ray Chest 1 View (CXR): No results found for this visit on 12/12/18. and X-Ray Chest PA and Lateral (CXR): No results found for this visit on 12/12/18.    Assessment and Plan:     * Elevated troponin    Initial troponin 0.202  Trend troponin 0.202>1.111>1.087  IV heparin gtt started this AM without bolus  Hold Eliquis for now  Continue ASA, IV heparin gtt, Zetia, BB  No  ACEi/ARB for now given BP on low side  No statin due to intolerance in the past  FLP pending  TTE pending  Recent stress test negative per Dr. Echavarria, records in file  Further recs to follow      Chest pain    See plan under elevated troponin     Type 2 diabetes mellitus, without long-term current use of insulin    Mgmt per primary team  Needs better diabetes control  A1C 9.2%         VTE Risk Mitigation (From admission, onward)        Ordered     heparin 25,000 units in dextrose 5% 250 mL (100 units/mL) infusion LOW INTENSITY nomogram - [facility]  Continuous      12/13/18 1111     heparin 25,000 units in dextrose 5% (100 units/ml) IV bolus from bag - ADDITIONAL PRN BOLUS - 30 units/kg (max bolus 4000 units)  As needed (PRN)      12/13/18 1111          Thank you for your consult. I will follow-up with patient. Please contact us if you have any additional questions.    Ngoc Reno NP  Cardiology   [facility] [place]

## 2018-12-13 NOTE — HOSPITAL COURSE
"The patient was placed in Observation due to complaints of chest pain and an elevated troponin. Her troponin trended from 0.2 to 1.11. Due to the upward trend and abnormal findings on echocardiogram, a left heart catheterization was performed on 12/14/18. The procedure was significant for normal coronary arteries. The patient's chest pain resolved and Norvasc was added to her home medication regimen. Of note, the patient reported "feeling jittery" after a single dose of Lipitor. The medication was discontinued due to this drug reaction. She was not started on an ACE or ARB due to hypotension.    "

## 2018-12-13 NOTE — NURSING
Patient assessed for diabetes educational needs following chart review   at bedside for info  She reports receives diabetes education from her nurse through her insurance  She has a home glucose monitor and reports checks her glucose daily reports recent steroid injection which she is aware can elevate her glucose  She is consistent with taking her diabetes med's  Reviewed info on target glucose values, hyper/hypoglycemia  Verbalizes understanding    Does not need literature

## 2018-12-13 NOTE — PLAN OF CARE
Problem: Adult Inpatient Plan of Care  Goal: Plan of Care Review  Outcome: Ongoing (interventions implemented as appropriate)  POC reviewed, verbalized understanding. Pt remained free from falls, fall precautions in place. Pt is SR on monitor, 70s-90s. VSS. No other c/o at this time. PIV intact. Call bell and personal belongings within reach. Hourly rounding complete. Reminded to call for assistance. Will continue to monitor.

## 2018-12-13 NOTE — SUBJECTIVE & OBJECTIVE
"Interval History: Her troponin trended from 0.2 to 1.11. Due to the upward trend and abnormal findings of echocardiogram, a left heart catheterization is planned for 12/14/18. The patient denies any further chest pain, but reports "feeling jittery" after her evening medications last night. Upon further discussion, she reports having those same symptoms after taking Lipitor in the past. Her adverse reaction to Lipitor was added to her medical list.     Review of Systems   Constitutional: Negative for appetite change, chills, diaphoresis, fatigue and fever.   HENT: Negative for congestion, ear pain, mouth sores, sore throat and trouble swallowing.    Eyes: Negative for pain and visual disturbance.   Respiratory: Positive for shortness of breath. Negative for cough and chest tightness.    Cardiovascular: Positive for chest pain. Negative for palpitations and leg swelling.   Gastrointestinal: Negative for abdominal pain, constipation and nausea.   Endocrine: Negative for cold intolerance, heat intolerance, polydipsia and polyuria.   Genitourinary: Negative for dysuria, frequency and hematuria.   Musculoskeletal: Positive for back pain. Negative for arthralgias, myalgias and neck pain.   Skin: Negative for pallor, rash and wound.   Allergic/Immunologic: Negative for environmental allergies and immunocompromised state.   Neurological: Negative for dizziness, seizures, syncope, weakness, numbness and headaches.   Hematological: Negative for adenopathy. Does not bruise/bleed easily.   Psychiatric/Behavioral: Negative for agitation, confusion and sleep disturbance.     Objective:     Vital Signs (Most Recent):  Temp: 97.7 °F (36.5 °C) (12/13/18 0745)  Pulse: 82 (12/13/18 0745)  Resp: 17 (12/13/18 0745)  BP: 106/69 (12/13/18 0745)  SpO2: 96 % (12/13/18 0745) Vital Signs (24h Range):  Temp:  [97.7 °F (36.5 °C)-98.3 °F (36.8 °C)] 97.7 °F (36.5 °C)  Pulse:  [] 82  Resp:  [16-18] 17  SpO2:  [93 %-98 %] 96 %  BP: " (106-157)/(56-96) 106/69     Weight: 114.5 kg (252 lb 6.8 oz)  Body mass index is 43.33 kg/m².  No intake or output data in the 24 hours ending 12/13/18 1036   Physical Exam   Constitutional: She is oriented to person, place, and time. She appears well-developed and well-nourished. No distress.   HENT:   Head: Normocephalic and atraumatic.   Eyes: Conjunctivae are normal.   PERRL   Neck: Neck supple. No JVD present.   Cardiovascular: Normal rate, regular rhythm and normal heart sounds.   Pulmonary/Chest: Effort normal and breath sounds normal. She has in the right lower field and the left lower field.   Abdominal: Soft. Bowel sounds are normal. She exhibits no distension. There is no tenderness.   Musculoskeletal: Normal range of motion. She exhibits edema.   Neurological: She is alert and oriented to person, place, and time.   Skin: Skin is warm and dry.   Psychiatric: She has a normal mood and affect. Her behavior is normal. Thought content normal.   Nursing note and vitals reviewed.      Significant Labs:   CBC:   Recent Labs   Lab 12/12/18  1236 12/13/18  0543   WBC 9.16 10.46   HGB 12.1 11.0*   HCT 36.8* 35.2*    213     CMP:   Recent Labs   Lab 12/12/18  1236 12/13/18  0543    137   K 3.9 4.4    111*   CO2 20* 18*   * 237*   BUN 15 13   CREATININE 0.9 0.9   CALCIUM 9.4 8.9   PROT 6.8  --    ALBUMIN 3.7  --    BILITOT 0.3  --    ALKPHOS 96  --    AST 19  --    ALT 15  --    ANIONGAP 12 8   EGFRNONAA >60 >60     Troponin:   Recent Labs   Lab 12/12/18  1236 12/13/18  0854   TROPONINI 0.202* 1.111*     All pertinent labs within the past 24 hours have been reviewed.    Significant Imaging: I have reviewed all pertinent imaging results/findings within the past 24 hours.   Imaging Results          X-Ray Chest AP Portable (Final result)  Result time 12/12/18 13:12:22    Final result by WAYNE Flynn Sr., MD (12/12/18 13:12:22)                 Impression:      1. The lungs are clear.  2.  There are mild osteoarthritic changes in the right acromioclavicular joint.  .      Electronically signed by: Bharathi Flynn MD  Date:    12/12/2018  Time:    13:12             Narrative:    EXAMINATION:  XR CHEST AP PORTABLE    CLINICAL HISTORY:  Chest Pain;    COMPARISON:  08/17/2016    FINDINGS:  The size of the heart is normal. The lungs are clear. There is no pneumothorax.  The costophrenic angles are sharp.  There are mild osteoarthritic changes in the right acromioclavicular joint.

## 2018-12-13 NOTE — SUBJECTIVE & OBJECTIVE
Past Medical History:   Diagnosis Date    Atrial fibrillation     Bipolar disorder     DM w/o complication type II        Past Surgical History:   Procedure Laterality Date    GASTRIC BYPASS  2006    HYSTERECTOMY  2002       Review of patient's allergies indicates:   Allergen Reactions    Lipitor [atorvastatin]        No current facility-administered medications on file prior to encounter.      Current Outpatient Medications on File Prior to Encounter   Medication Sig    apixaban (ELIQUIS) 5 mg Tab Take 5 mg by mouth 2 (two) times daily.    busPIRone (BUSPAR) 10 MG tablet Take 10 mg by mouth 2 (two) times daily.    carBAMazepine (TEGRETOL) 200 mg tablet Take 200 mg by mouth every evening.    cyanocobalamin, vitamin B-12, (VITAMIN B-12) 2,500 mcg Subl Place 2,500 mcg under the tongue once daily.    ezetimibe (ZETIA) 10 mg tablet Take 10 mg by mouth once daily.    SITagliptin (JANUVIA) 100 MG Tab Take 100 mg by mouth once daily.     Family History     None        Tobacco Use    Smoking status: Never Smoker   Substance and Sexual Activity    Alcohol use: No    Drug use: No    Sexual activity: Not on file     Review of Systems   Constitution: Positive for malaise/fatigue. Negative for weakness.   HENT: Negative for hearing loss and hoarse voice.    Eyes: Negative for blurred vision and visual disturbance.   Cardiovascular: Positive for chest pain (resolved). Negative for claudication, dyspnea on exertion, irregular heartbeat, leg swelling, near-syncope, orthopnea, palpitations, paroxysmal nocturnal dyspnea and syncope.   Respiratory: Positive for shortness of breath (resolved). Negative for cough, hemoptysis, sleep disturbances due to breathing, snoring and wheezing.    Endocrine: Negative for cold intolerance and heat intolerance.   Hematologic/Lymphatic: Bruises/bleeds easily.   Skin: Negative for color change, dry skin and nail changes.   Musculoskeletal: Positive for arthritis. Negative for back  pain, joint pain and myalgias.   Gastrointestinal: Positive for vomiting (intermittent post food intake). Negative for bloating, abdominal pain, constipation and nausea.   Genitourinary: Negative for dysuria, flank pain, hematuria and hesitancy.   Neurological: Negative for headaches, light-headedness, loss of balance, numbness and paresthesias.   Psychiatric/Behavioral: Negative for altered mental status.   Allergic/Immunologic: Negative for environmental allergies.     Objective:     Vital Signs (Most Recent):  Temp: 98.6 °F (37 °C) (12/13/18 1208)  Pulse: 87 (12/13/18 1208)  Resp: 18 (12/13/18 1208)  BP: 129/73 (12/13/18 1208)  SpO2: 95 % (12/13/18 1208) Vital Signs (24h Range):  Temp:  [97.7 °F (36.5 °C)-98.6 °F (37 °C)] 98.6 °F (37 °C)  Pulse:  [] 87  Resp:  [16-18] 18  SpO2:  [93 %-98 %] 95 %  BP: (106-131)/(56-80) 129/73     Weight: 114.5 kg (252 lb 6.8 oz)  Body mass index is 43.33 kg/m².    SpO2: 95 %  O2 Device (Oxygen Therapy): room air    No intake or output data in the 24 hours ending 12/13/18 1251    Lines/Drains/Airways     Peripheral Intravenous Line                 Peripheral IV - Single Lumen 12/12/18 1248 Left Antecubital 1 day                Physical Exam   Constitutional: She is oriented to person, place, and time. She appears well-developed and well-nourished. No distress.   HENT:   Head: Normocephalic and atraumatic.   Eyes: Pupils are equal, round, and reactive to light.   Neck: Normal range of motion and full passive range of motion without pain. Neck supple. No JVD present.   Cardiovascular: Normal rate, regular rhythm, S1 normal, S2 normal and intact distal pulses. PMI is not displaced. Exam reveals no distant heart sounds.   No murmur heard.  Pulses:       Radial pulses are 2+ on the right side, and 2+ on the left side.        Dorsalis pedis pulses are 2+ on the right side, and 2+ on the left side.   Pulmonary/Chest: Effort normal and breath sounds normal. No accessory muscle usage.  No respiratory distress. She has no decreased breath sounds. She has no wheezes. She has no rales.   Abdominal: Soft. Bowel sounds are normal. She exhibits no distension. There is no tenderness.   obese   Musculoskeletal: Normal range of motion. She exhibits no edema.        Right ankle: She exhibits no swelling.        Left ankle: She exhibits no swelling.   Neurological: She is alert and oriented to person, place, and time.   Skin: Skin is warm and dry. She is not diaphoretic. No cyanosis. Nails show no clubbing.   Psychiatric: She has a normal mood and affect. Her speech is normal and behavior is normal. Judgment and thought content normal. Cognition and memory are normal.   Nursing note and vitals reviewed.      Significant Labs:   BMP:   Recent Labs   Lab 12/12/18  1236 12/13/18  0543   * 237*    137   K 3.9 4.4    111*   CO2 20* 18*   BUN 15 13   CREATININE 0.9 0.9   CALCIUM 9.4 8.9   MG  --  2.1   , CMP   Recent Labs   Lab 12/12/18  1236 12/13/18  0543    137   K 3.9 4.4    111*   CO2 20* 18*   * 237*   BUN 15 13   CREATININE 0.9 0.9   CALCIUM 9.4 8.9   PROT 6.8  --    ALBUMIN 3.7  --    BILITOT 0.3  --    ALKPHOS 96  --    AST 19  --    ALT 15  --    ANIONGAP 12 8   ESTGFRAFRICA >60 >60   EGFRNONAA >60 >60   , CBC   Recent Labs   Lab 12/12/18  1236 12/13/18  0543   WBC 9.16 10.46   HGB 12.1 11.0*   HCT 36.8* 35.2*    213   , Lipid Panel No results for input(s): CHOL, HDL, LDLCALC, TRIG, CHOLHDL in the last 48 hours., Troponin   Recent Labs   Lab 12/12/18  1236 12/13/18  0854 12/13/18  1025   TROPONINI 0.202* 1.111* 1.087*    and All pertinent lab results from the last 24 hours have been reviewed.    Significant Imaging: Echocardiogram: 2D echo with color flow doppler: No results found for this or any previous visit. and X-Ray: CXR: X-Ray Chest 1 View (CXR): No results found for this visit on 12/12/18. and X-Ray Chest PA and Lateral (CXR): No results found for this  visit on 12/12/18.

## 2018-12-13 NOTE — PLAN OF CARE
CM met with patient and  at bedside to assess discharge needs. Patient lives at home with  and is independent with needs. Patient ambulates safely independently and denies any recent falls. No dc needs identified at this time. CM provided a transitional care folder, information on advanced directives, information on pharmacy bedside delivery, and discharge planning begins on admission with contact information for any needs/questions. Updated patient white board with current d/c plan and CM contact information. Encouraged patient to contact CM if any questions arise.     DC Plan: Home/selfcare     Transportation home at d/c:      Contact: Loly Garcias, spouse  Phone: [phone]     PCP: Tito Carbone MD      Primary Payor: [facility]  Secondary Payor:    Handed off to TANYA Connor                 12/13/18 4507   Discharge Assessment   Assessment Type Discharge Planning Assessment   Confirmed/corrected address and phone number on facesheet? Yes   Assessment information obtained from? Patient   Prior to hospitilization cognitive status: Alert/Oriented   Prior to hospitalization functional status: Independent   Current cognitive status: Alert/Oriented   Current Functional Status: Independent   Lives With spouse   Able to Return to Prior Arrangements no   Is patient able to care for self after discharge? Yes   Patient's perception of discharge disposition admitted as an inpatient   Readmission Within the Last 30 Days no previous admission in last 30 days   Patient currently being followed by outpatient case management? No   Patient currently receives any other outside agency services? No   Equipment Currently Used at Home none   Do you have any problems affording any of your prescribed medications? No   Is the patient taking medications as prescribed? yes   Does the patient have transportation home? Yes   Transportation Anticipated family or friend will provide   Does the patient  receive services at the [facility]? No   Discharge Plan A Home   Patient/Family in Agreement with Plan yes

## 2018-12-13 NOTE — HPI
Mrs. Dewey is a 63 year old female with PMHx of AFIB on Eliquis, HTN, HLP, DM who presented to [facility] yesterday due to chest pain after argument with her sister. She reports that chest pain did radiate to her back and both arms and experienced episode of weakness at that time with associated shortness of breath. Primary cardiologist is Dr. Echavarria at  Cardiology and had recent negative stress test. ED workup revealed decreased O2 saturation in 90s., initial troponin 0.202. Cardiology consulted to assist with management. Patient seen and examined in room this AM. Denies chest pain or anginal equivalents on exam today. No shortness of breath, COHN or palpitations. Denies orthopnea, PND or abdominal bloating. She does endorse having issues with vomiting post food intake and acid reflux since gastric bypass surgery which has been ongoing for some time per patient report. TTE pending. Repeat troponin 1.111, IV heparin gtt started without bolus and Eliquis on hold for now. Will continue to trend serial troponins until peaked. Further recs to follow.

## 2018-12-13 NOTE — H&P
"[facility] Hospital Medicine  History & Physical    Patient Name: Keysha Dewey  MRN: [id number]  Admission Date: 12/12/2018  Attending Physician: Orion Grimaldo MD   Primary Care Provider: Tito Carbone MD         Patient information was obtained from patient, past medical records and ER records.     Subjective:     Principal Problem:Elevated troponin    Chief Complaint:   Chief Complaint   Patient presents with    Chest Pain     pt c/o chest pain that started about an hour ago that radiates to her back        HPI: Keysha Dewey is a 63 year old female with atrial fibrillation on Eliquis, DM II and bipolar disorder who presented to the ED with complaints of substernal chest pain. The pain began during an argument with her sister. It radiated into her back and both arms became weak.  There was associated SOB. The patient denies nausea and diaphoresis. She denies prior history of chest pain, but sees Dr. Echavarria at  Cardiology. She reports having a negative stress test on 8/29/18. These results were confirmed with obtained records from Dr. Echavarria. Work up in the ED was essentially negative with the exception persistent oxygen saturations in the low 90s.     Interval History: Her troponin trended from 0.2 to 1.11. Due to the upward trend and abnormal findings of echocardiogram, a left heart catheterization is planned for 12/14/18. The patient denies any further chest pain, but reports "feeling jittery" after her evening medications last night. Upon further discussion, she reports having those same symptoms after taking Lipitor in the past. Her adverse reaction to Lipitor was added to her medical list.     Review of Systems   Constitutional: Negative for appetite change, chills, diaphoresis, fatigue and fever.   HENT: Negative for congestion, ear pain, mouth sores, sore throat and trouble swallowing.    Eyes: Negative for pain and visual disturbance.   Respiratory: Positive for shortness of breath. " Negative for cough and chest tightness.    Cardiovascular: Positive for chest pain. Negative for palpitations and leg swelling.   Gastrointestinal: Negative for abdominal pain, constipation and nausea.   Endocrine: Negative for cold intolerance, heat intolerance, polydipsia and polyuria.   Genitourinary: Negative for dysuria, frequency and hematuria.   Musculoskeletal: Positive for back pain. Negative for arthralgias, myalgias and neck pain.   Skin: Negative for pallor, rash and wound.   Allergic/Immunologic: Negative for environmental allergies and immunocompromised state.   Neurological: Negative for dizziness, seizures, syncope, weakness, numbness and headaches.   Hematological: Negative for adenopathy. Does not bruise/bleed easily.   Psychiatric/Behavioral: Negative for agitation, confusion and sleep disturbance.     Objective:     Vital Signs (Most Recent):  Temp: 97.7 °F (36.5 °C) (12/13/18 0745)  Pulse: 82 (12/13/18 0745)  Resp: 17 (12/13/18 0745)  BP: 106/69 (12/13/18 0745)  SpO2: 96 % (12/13/18 0745) Vital Signs (24h Range):  Temp:  [97.7 °F (36.5 °C)-98.3 °F (36.8 °C)] 97.7 °F (36.5 °C)  Pulse:  [] 82  Resp:  [16-18] 17  SpO2:  [93 %-98 %] 96 %  BP: (106-157)/(56-96) 106/69     Weight: 114.5 kg (252 lb 6.8 oz)  Body mass index is 43.33 kg/m².  No intake or output data in the 24 hours ending 12/13/18 1036   Physical Exam   Constitutional: She is oriented to person, place, and time. She appears well-developed and well-nourished. No distress.   HENT:   Head: Normocephalic and atraumatic.   Eyes: Conjunctivae are normal.   PERRL   Neck: Neck supple. No JVD present.   Cardiovascular: Normal rate, regular rhythm and normal heart sounds.   Pulmonary/Chest: Effort normal and breath sounds normal. She has in the right lower field and the left lower field.   Abdominal: Soft. Bowel sounds are normal. She exhibits no distension. There is no tenderness.   Musculoskeletal: Normal range of motion. She exhibits  edema.   Neurological: She is alert and oriented to person, place, and time.   Skin: Skin is warm and dry.   Psychiatric: She has a normal mood and affect. Her behavior is normal. Thought content normal.   Nursing note and vitals reviewed.      Significant Labs:   CBC:   Recent Labs   Lab 12/12/18  1236 12/13/18  0543   WBC 9.16 10.46   HGB 12.1 11.0*   HCT 36.8* 35.2*    213     CMP:   Recent Labs   Lab 12/12/18  1236 12/13/18  0543    137   K 3.9 4.4    111*   CO2 20* 18*   * 237*   BUN 15 13   CREATININE 0.9 0.9   CALCIUM 9.4 8.9   PROT 6.8  --    ALBUMIN 3.7  --    BILITOT 0.3  --    ALKPHOS 96  --    AST 19  --    ALT 15  --    ANIONGAP 12 8   EGFRNONAA >60 >60     Troponin:   Recent Labs   Lab 12/12/18  1236 12/13/18  0854   TROPONINI 0.202* 1.111*     All pertinent labs within the past 24 hours have been reviewed.    Significant Imaging: I have reviewed all pertinent imaging results/findings within the past 24 hours.   Imaging Results          X-Ray Chest AP Portable (Final result)  Result time 12/12/18 13:12:22    Final result by WAYNE Flynn Sr., MD (12/12/18 13:12:22)                 Impression:      1. The lungs are clear.  2. There are mild osteoarthritic changes in the right acromioclavicular joint.  .      Electronically signed by: Bharathi Flynn MD  Date:    12/12/2018  Time:    13:12             Narrative:    EXAMINATION:  XR CHEST AP PORTABLE    CLINICAL HISTORY:  Chest Pain;    COMPARISON:  08/17/2016    FINDINGS:  The size of the heart is normal. The lungs are clear. There is no pneumothorax.  The costophrenic angles are sharp.  There are mild osteoarthritic changes in the right acromioclavicular joint.                              Assessment/Plan:     * Elevated troponin    -[facility] planned 12/14/18.   -Continue to trend troponin.  -2D echocardiogram results pending.  -Continue ASA and metoprolol.  -Decision regarding addition of second antiplatelet agent pending [facility]  results.   -Statin held due to adverse reaction. ACE held due to hypotension.   -The patient was evaluated for cardiac rehab and is not a candidate at this time. She will be referred, if appropriate.     -Cardiology following.       Obesity hypoventilation syndrome    -Likely the cause of patient's chronic hypoxia.   -Oxygen as needed.   -Outpatient Pulmonology follow up.   -Educated on the importance of proper nutrition and caloric intake and the detrimental side effects of morbid obesity.          Bipolar disorder    Continue buspirone and carbamazepine.          Type 2 diabetes mellitus, without long-term current use of insulin    -NISS  -Hold Januvia  -ADA diet.  -Check Hemoglobin A1C.         VTE Risk Mitigation (From admission, onward)        Ordered     apixaban tablet 5 mg  2 times daily      12/12/18 7045             ИВАН Crawford  Department of Hospital Medicine   [facility]

## 2018-12-13 NOTE — PROGRESS NOTES
Unstable angina with elevated troponin, IV heparin gtt started this AM. Plans for LHC in AM, will keep NPO after midnight. ASA started this AM. NO Plavix for now given plans for LHC with possible intervention.       JENS VasquezP-C

## 2018-12-13 NOTE — PLAN OF CARE
Problem: Adult Inpatient Plan of Care  Goal: Plan of Care Review  Outcome: Ongoing (interventions implemented as appropriate)  Pt free of falls during shift. VSS. Peripheral IV intact. Fluids infusing. Tele ordered pt on the monitor. Pt on RA no shortness of breath noted. Pain controlled as much as possible with ordered meds. Call light in reach. Hourly rounds done. Family present at bedside. Will continue to monitor

## 2018-12-13 NOTE — PLAN OF CARE
CM met with patient and  at bedside to assess discharge needs. Patient lives at home with  and is independent with needs. Patient ambulates safely independently and denies any recent falls. No dc needs identified at this time. CM provided a transitional care folder, information on advanced directives, information on pharmacy bedside delivery, and discharge planning begins on admission with contact information for any needs/questions. Updated patient white board with current d/c plan and CM contact information. Encouraged patient to contact CM if any questions arise.    DC Plan: Home/selfcare    Transportation home at d/c:     Contact: Loly Dewey, spouse  Phone: [phone]    PCP: Tito Carbone MD     Primary Payor: [facility]  Secondary Payor:

## 2018-12-14 VITALS
TEMPERATURE: 99 F | OXYGEN SATURATION: 93 % | RESPIRATION RATE: 18 BRPM | HEART RATE: 71 BPM | WEIGHT: 252.44 LBS | HEIGHT: 64 IN | SYSTOLIC BLOOD PRESSURE: 120 MMHG | DIASTOLIC BLOOD PRESSURE: 62 MMHG | BODY MASS INDEX: 43.1 KG/M2

## 2018-12-14 PROBLEM — I42.0 CARDIOMYOPATHY, DILATED: Status: ACTIVE | Noted: 2018-12-14

## 2018-12-14 PROBLEM — R93.1 ABNORMAL ECHOCARDIOGRAM: Status: ACTIVE | Noted: 2018-12-14

## 2018-12-14 PROBLEM — R07.9 CHEST PAIN: Status: RESOLVED | Noted: 2018-12-13 | Resolved: 2018-12-14

## 2018-12-14 PROBLEM — I20.0 ANGINA PECTORIS, UNSTABLE: Status: RESOLVED | Noted: 2018-12-13 | Resolved: 2018-12-14

## 2018-12-14 LAB
ANION GAP SERPL CALC-SCNC: 9 MMOL/L
APTT BLDCRRT: 26.2 SEC
APTT BLDCRRT: 33.6 SEC
APTT BLDCRRT: 45.8 SEC
BASOPHILS # BLD AUTO: 0.03 K/UL
BASOPHILS NFR BLD: 0.3 %
BUN SERPL-MCNC: 11 MG/DL
CALCIUM SERPL-MCNC: 9.2 MG/DL
CHLORIDE SERPL-SCNC: 109 MMOL/L
CO2 SERPL-SCNC: 21 MMOL/L
CREAT SERPL-MCNC: 0.8 MG/DL
DIFFERENTIAL METHOD: ABNORMAL
EOSINOPHIL # BLD AUTO: 0.2 K/UL
EOSINOPHIL NFR BLD: 2.1 %
ERYTHROCYTE [DISTWIDTH] IN BLOOD BY AUTOMATED COUNT: 13.5 %
EST. GFR  (AFRICAN AMERICAN): >60 ML/MIN/1.73 M^2
EST. GFR  (NON AFRICAN AMERICAN): >60 ML/MIN/1.73 M^2
GLUCOSE SERPL-MCNC: 199 MG/DL
HCT VFR BLD AUTO: 37.1 %
HGB BLD-MCNC: 11.9 G/DL
LYMPHOCYTES # BLD AUTO: 3.6 K/UL
LYMPHOCYTES NFR BLD: 33.3 %
MAGNESIUM SERPL-MCNC: 2.2 MG/DL
MCH RBC QN AUTO: 25.2 PG
MCHC RBC AUTO-ENTMCNC: 32.1 G/DL
MCV RBC AUTO: 78 FL
MONOCYTES # BLD AUTO: 0.5 K/UL
MONOCYTES NFR BLD: 4.6 %
NEUTROPHILS # BLD AUTO: 6.4 K/UL
NEUTROPHILS NFR BLD: 59.7 %
PHOSPHATE SERPL-MCNC: 2.9 MG/DL
PLATELET # BLD AUTO: 236 K/UL
PMV BLD AUTO: 9.7 FL
POCT GLUCOSE: 167 MG/DL (ref 70–110)
POCT GLUCOSE: 187 MG/DL (ref 70–110)
POTASSIUM SERPL-SCNC: 3.9 MMOL/L
RBC # BLD AUTO: 4.73 M/UL
SODIUM SERPL-SCNC: 139 MMOL/L
WBC # BLD AUTO: 10.71 K/UL

## 2018-12-14 PROCEDURE — 25000003 PHARM REV CODE 250

## 2018-12-14 PROCEDURE — 36415 COLL VENOUS BLD VENIPUNCTURE: CPT

## 2018-12-14 PROCEDURE — 63600175 PHARM REV CODE 636 W HCPCS: Performed by: NURSE PRACTITIONER

## 2018-12-14 PROCEDURE — 99152 MOD SED SAME PHYS/QHP 5/>YRS: CPT

## 2018-12-14 PROCEDURE — 63600175 PHARM REV CODE 636 W HCPCS

## 2018-12-14 PROCEDURE — 25000003 PHARM REV CODE 250: Performed by: PHYSICIAN ASSISTANT

## 2018-12-14 PROCEDURE — 93458 L HRT ARTERY/VENTRICLE ANGIO: CPT | Mod: 26,,, | Performed by: INTERNAL MEDICINE

## 2018-12-14 PROCEDURE — B2111ZZ FLUOROSCOPY OF MULTIPLE CORONARY ARTERIES USING LOW OSMOLAR CONTRAST: ICD-10-PCS | Performed by: INTERNAL MEDICINE

## 2018-12-14 PROCEDURE — B2151ZZ FLUOROSCOPY OF LEFT HEART USING LOW OSMOLAR CONTRAST: ICD-10-PCS | Performed by: INTERNAL MEDICINE

## 2018-12-14 PROCEDURE — 80048 BASIC METABOLIC PNL TOTAL CA: CPT

## 2018-12-14 PROCEDURE — 99152 MOD SED SAME PHYS/QHP 5/>YRS: CPT | Mod: ,,, | Performed by: INTERNAL MEDICINE

## 2018-12-14 PROCEDURE — 83735 ASSAY OF MAGNESIUM: CPT

## 2018-12-14 PROCEDURE — 99232 SBSQ HOSP IP/OBS MODERATE 35: CPT | Mod: ,,, | Performed by: INTERNAL MEDICINE

## 2018-12-14 PROCEDURE — 25000003 PHARM REV CODE 250: Performed by: NURSE PRACTITIONER

## 2018-12-14 PROCEDURE — 84100 ASSAY OF PHOSPHORUS: CPT

## 2018-12-14 PROCEDURE — 85025 COMPLETE CBC W/AUTO DIFF WBC: CPT

## 2018-12-14 PROCEDURE — 85730 THROMBOPLASTIN TIME PARTIAL: CPT | Mod: 91

## 2018-12-14 PROCEDURE — 25000003 PHARM REV CODE 250: Performed by: INTERNAL MEDICINE

## 2018-12-14 PROCEDURE — 4A023N7 MEASUREMENT OF CARDIAC SAMPLING AND PRESSURE, LEFT HEART, PERCUTANEOUS APPROACH: ICD-10-PCS | Performed by: INTERNAL MEDICINE

## 2018-12-14 RX ORDER — NITROGLYCERIN 0.4 MG/1
0.4 TABLET SUBLINGUAL EVERY 5 MIN PRN
Status: DISCONTINUED | OUTPATIENT
Start: 2018-12-14 | End: 2018-12-14 | Stop reason: HOSPADM

## 2018-12-14 RX ORDER — ATROPINE SULFATE 0.1 MG/ML
0.5 INJECTION INTRAVENOUS
Status: DISCONTINUED | OUTPATIENT
Start: 2018-12-14 | End: 2018-12-14 | Stop reason: HOSPADM

## 2018-12-14 RX ORDER — AMLODIPINE BESYLATE 2.5 MG/1
2.5 TABLET ORAL DAILY
Qty: 30 TABLET | Refills: 0 | Status: SHIPPED | OUTPATIENT
Start: 2018-12-15 | End: 2019-01-08 | Stop reason: SDUPTHER

## 2018-12-14 RX ORDER — AMLODIPINE BESYLATE 2.5 MG/1
2.5 TABLET ORAL DAILY
Status: DISCONTINUED | OUTPATIENT
Start: 2018-12-14 | End: 2018-12-14 | Stop reason: HOSPADM

## 2018-12-14 RX ORDER — HYDROCODONE BITARTRATE AND ACETAMINOPHEN 5; 325 MG/1; MG/1
1 TABLET ORAL EVERY 4 HOURS PRN
Status: DISCONTINUED | OUTPATIENT
Start: 2018-12-14 | End: 2018-12-14 | Stop reason: HOSPADM

## 2018-12-14 RX ORDER — OXYCODONE HYDROCHLORIDE 5 MG/1
10 TABLET ORAL EVERY 4 HOURS PRN
Status: DISCONTINUED | OUTPATIENT
Start: 2018-12-14 | End: 2018-12-14 | Stop reason: HOSPADM

## 2018-12-14 RX ORDER — DIPHENHYDRAMINE HCL 50 MG
50 CAPSULE ORAL ONCE
Status: DISCONTINUED | OUTPATIENT
Start: 2018-12-14 | End: 2018-12-14 | Stop reason: HOSPADM

## 2018-12-14 RX ORDER — METOPROLOL TARTRATE 25 MG/1
12.5 TABLET, FILM COATED ORAL 2 TIMES DAILY
Qty: 30 TABLET | Refills: 0 | Status: SHIPPED | OUTPATIENT
Start: 2018-12-14 | End: 2019-01-08 | Stop reason: SDUPTHER

## 2018-12-14 RX ORDER — ACETAMINOPHEN 325 MG/1
650 TABLET ORAL EVERY 4 HOURS PRN
Status: DISCONTINUED | OUTPATIENT
Start: 2018-12-14 | End: 2018-12-14 | Stop reason: HOSPADM

## 2018-12-14 RX ORDER — ASPIRIN 81 MG/1
81 TABLET ORAL DAILY
Refills: 0 | COMMUNITY
Start: 2018-12-15 | End: 2019-12-10 | Stop reason: ALTCHOICE

## 2018-12-14 RX ORDER — SODIUM CHLORIDE 9 MG/ML
INJECTION, SOLUTION INTRAVENOUS CONTINUOUS
Status: ACTIVE | OUTPATIENT
Start: 2018-12-14 | End: 2018-12-14

## 2018-12-14 RX ADMIN — SODIUM CHLORIDE: 0.9 INJECTION, SOLUTION INTRAVENOUS at 10:12

## 2018-12-14 RX ADMIN — SODIUM CHLORIDE: 0.9 INJECTION, SOLUTION INTRAVENOUS at 03:12

## 2018-12-14 RX ADMIN — AMLODIPINE BESYLATE 2.5 MG: 2.5 TABLET ORAL at 01:12

## 2018-12-14 RX ADMIN — ASPIRIN 81 MG: 81 TABLET, COATED ORAL at 10:12

## 2018-12-14 RX ADMIN — BUSPIRONE HYDROCHLORIDE 10 MG: 10 TABLET ORAL at 10:12

## 2018-12-14 RX ADMIN — METOPROLOL TARTRATE 12.5 MG: 25 TABLET, FILM COATED ORAL at 10:12

## 2018-12-14 RX ADMIN — HEPARIN SODIUM AND DEXTROSE 14 UNITS/KG/HR: 10000; 5 INJECTION INTRAVENOUS at 03:12

## 2018-12-14 RX ADMIN — EZETIMIBE 10 MG: 10 TABLET ORAL at 10:12

## 2018-12-14 NOTE — NURSING TRANSFER
Nursing Transfer Note      12/14/2018     Transfer To: 226    Transfer via bed    Transfer with cardiac monitoring    Transported by [employer]    Medicines sent: NONE    Chart send with patient: Yes    Notified: spouse    Patient reassessed at: TRANSFERRED TO ROOM. TRANSFERRED TO BED.  TELEMETRY MONITER ON.  IV FLUIDS ON PUMP AT PRESCRIBED RATE.  PROCEDURAL ACCESS SITE WITHOUT BLEEDING OR HEMATOMA.  DRESSING DRY AND INTACT.  CARE HANDED OFF TOPENNY 12/14/18 1030......... (date, time)    Upon arrival to floor: cardiac monitor applied, patient oriented to room, call bell in reach and bed in lowest position

## 2018-12-14 NOTE — BRIEF OP NOTE
<[facility]  Surgery Department  Operative Note    SUMMARY     Date of Procedure: 12/14/2018     Procedure: Procedure(s) (LRB):  CATHETERIZATION, HEART, LEFT (Left)     Surgeon(s) and Role:     * Alexis Smith MD - Primary    Assisting Surgeon: None    Pre-Operative Diagnosis: Chest pain, unspecified type [R07.9]    Post-Operative Diagnosis: Post-Op Diagnosis Codes:     * Chest pain, unspecified type [R07.9]    Anesthesia: RN IV Sedation    Technical Procedures Used: [facility]    Description of the Findings of the Procedure: [facility], DX: ANGINA, CARDIOMYOPATHY, FINDINGS: NORMAL CORONARIES    Significant Surgical Tasks Conducted by the Assistant(s), if Applicable: NONE    Complications: No    Estimated Blood Loss (EBL): < 50 cc           Implants: * No implants in log *    Specimens:   Specimen (12h ago, onward)    None                  Condition: Good    Disposition: PACU - hemodynamically stable.    Attestation: I was present and scrubbed for the entire procedure.

## 2018-12-14 NOTE — PLAN OF CARE
Problem: Adult Inpatient Plan of Care  Goal: Plan of Care Review  POC reviewed, verbalized understanding. Pt remained free from falls, fall precautions in place. Pt is SR on monitor, 70s. VSS. No other c/o at this time. PIV intact. Call bell and personal belongings within reach. Hourly rounding complete. Reminded to call for assistance. Will continue to monitor. Pt NPS since midnight for heart cath in am.

## 2018-12-14 NOTE — NURSING
Patient awake alert oriented x4. Patient has no current c/o pain. Discharge instructions reviewed with patient verbal understanding given at this time. Patients IV discontinued at this time along with Tele monitor. Patient awaiting family to arrive and wheelchair to be wheeled to car. Patient instructed to call when family arrives for wheelchair.\

## 2018-12-14 NOTE — SUBJECTIVE & OBJECTIVE
"Interval History: Her troponin trended from 0.2 to 1.11. Due to the upward trend and abnormal findings of echocardiogram, a left heart catheterization is planned for 12/14/18. The patient denies any further chest pain, but reports "feeling jittery" after her evening medications last night. Upon further discussion, she reports having those same symptoms after taking Lipitor in the past. Her adverse reaction to Lipitor was added to her medical list.     Review of Systems   Constitutional: Negative for appetite change, chills, diaphoresis, fatigue and fever.   HENT: Negative for congestion, ear pain, mouth sores, sore throat and trouble swallowing.    Eyes: Negative for pain and visual disturbance.   Respiratory: Positive for shortness of breath. Negative for cough and chest tightness.    Cardiovascular: Positive for chest pain. Negative for palpitations and leg swelling.   Gastrointestinal: Negative for abdominal pain, constipation and nausea.   Endocrine: Negative for cold intolerance, heat intolerance, polydipsia and polyuria.   Genitourinary: Negative for dysuria, frequency and hematuria.   Musculoskeletal: Positive for back pain. Negative for arthralgias, myalgias and neck pain.   Skin: Negative for pallor, rash and wound.   Allergic/Immunologic: Negative for environmental allergies and immunocompromised state.   Neurological: Negative for dizziness, seizures, syncope, weakness, numbness and headaches.   Hematological: Negative for adenopathy. Does not bruise/bleed easily.   Psychiatric/Behavioral: Negative for agitation, confusion and sleep disturbance.     Objective:     Vital Signs (Most Recent):  Temp: 98.5 °F (36.9 °C) (12/14/18 1300)  Pulse: 68 (12/14/18 1313)  Resp: 20 (12/14/18 1300)  BP: 120/63 (12/14/18 1300)  SpO2: (!) 93 % (12/14/18 1300) Vital Signs (24h Range):  Temp:  [97 °F (36.1 °C)-100.3 °F (37.9 °C)] 98.5 °F (36.9 °C)  Pulse:  [66-91] 68  Resp:  [16-21] 20  SpO2:  [92 %-95 %] 93 %  BP: " (107-140)/(53-83) 120/63     Weight: 114.5 kg (252 lb 6.8 oz)  Body mass index is 43.33 kg/m².    Intake/Output Summary (Last 24 hours) at 12/14/2018 1330  Last data filed at 12/14/2018 1200  Gross per 24 hour   Intake 240 ml   Output --   Net 240 ml      Physical Exam   Constitutional: She is oriented to person, place, and time. She appears well-developed and well-nourished. No distress.   HENT:   Head: Normocephalic and atraumatic.   Eyes: Conjunctivae are normal.   PERRL   Neck: Neck supple. No JVD present.   Cardiovascular: Normal rate, regular rhythm and normal heart sounds.   Pulmonary/Chest: Effort normal and breath sounds normal. She has in the right lower field and the left lower field.   Abdominal: Soft. Bowel sounds are normal. She exhibits no distension. There is no tenderness.   Musculoskeletal: Normal range of motion. She exhibits edema.   Neurological: She is alert and oriented to person, place, and time.   Skin: Skin is warm and dry.   Psychiatric: She has a normal mood and affect. Her behavior is normal. Thought content normal.   Nursing note and vitals reviewed.      Significant Labs:   CBC:   Recent Labs   Lab 12/13/18  0543 12/14/18  0638   WBC 10.46 10.71   HGB 11.0* 11.9*   HCT 35.2* 37.1    236     CMP:   Recent Labs   Lab 12/13/18  0543 12/14/18  0638    139   K 4.4 3.9   * 109   CO2 18* 21*   * 199*   BUN 13 11   CREATININE 0.9 0.8   CALCIUM 8.9 9.2   ANIONGAP 8 9   EGFRNONAA >60 >60     Troponin:   Recent Labs   Lab 12/13/18  0854 12/13/18  1025 12/13/18  1629   TROPONINI 1.111* 1.087* 1.006*     All pertinent labs within the past 24 hours have been reviewed.    Significant Imaging: I have reviewed all pertinent imaging results/findings within the past 24 hours.   Imaging Results          X-Ray Chest AP Portable (Final result)  Result time 12/12/18 13:12:22    Final result by WAYNE Flynn Sr., MD (12/12/18 13:12:22)                 Impression:      1. The lungs  are clear.  2. There are mild osteoarthritic changes in the right acromioclavicular joint.  .      Electronically signed by: Bharathi Flynn MD  Date:    12/12/2018  Time:    13:12             Narrative:    EXAMINATION:  XR CHEST AP PORTABLE    CLINICAL HISTORY:  Chest Pain;    COMPARISON:  08/17/2016    FINDINGS:  The size of the heart is normal. The lungs are clear. There is no pneumothorax.  The costophrenic angles are sharp.  There are mild osteoarthritic changes in the right acromioclavicular joint.

## 2018-12-14 NOTE — DISCHARGE SUMMARY
"[facility] Hospital Medicine  Discharge Summary      Patient Name: Keysha Dewey  MRN: [id number]  Admission Date: 12/12/2018  Hospital Length of Stay: 1 days  Discharge Date and Time:  12/14/2018 1:36 PM  Attending Physician: Orion Grimaldo MD   Discharging Provider: ИВАН Crawford  Primary Care Provider: Tito Carbone MD      HPI:   Keysha Dewey is a 63 year old female with atrial fibrillation on Eliquis, DM II and bipolar disorder who presented to the ED with complaints of substernal chest pain. The pain began during an argument with her sister. It radiated into her back and both arms became weak.  There was associated SOB. The patient denies nausea and diaphoresis. She denies prior history of chest pain, but sees Dr. Echavarria at  Cardiology. She reports having a negative stress test on 8/29/18. These results were confirmed with obtained records from Dr. Echavarria. Work up in the ED was essentially negative with the exception persistent oxygen saturations in the low 90s.     Procedure(s) (LRB):  CATHETERIZATION, HEART, LEFT (Left)      Hospital Course:   The patient was placed in Observation due to complaints of chest pain and an elevated troponin. Her troponin trended from 0.2 to 1.11. Due to the upward trend and abnormal findings on echocardiogram, a left heart catheterization was performed on 12/14/18. The procedure was significant for normal coronary arteries. The patient's chest pain resolved and Norvasc was added to her home medication regimen for coronary vasospasm. Of note, the patient reported "feeling jittery" after a single dose of Lipitor. The medication was discontinued due to this drug reaction. She was not started on an ACE or ARB due to hypotension.       Consults:   Consults (From admission, onward)        Status Ordering Provider     Inpatient consult to Cardiology  Once     Provider:  Ramírez Smith, PhD    Completed [facility]        Final Active Diagnoses:    " Diagnosis Date Noted POA    PRINCIPAL PROBLEM:  Elevated troponin [R74.8] 12/12/2018 Yes    Coronary vasospasm      Obesity hypoventilation syndrome [E66.2] 12/12/2018 Yes     Chronic    Type 2 diabetes mellitus, without long-term current use of insulin [E11.9] 12/12/2018 Yes    Bipolar disorder [F31.9] 12/12/2018 Yes     Chronic    Cardiomyopathy, dilated [I42.0] 12/14/2018 Yes    Abnormal echocardiogram [R93.1] 12/14/2018 Yes      Problems Resolved During this Admission:    Diagnosis Date Noted Date Resolved POA    Chest pain [R07.9] 12/13/2018 12/14/2018 Yes     Discharged Condition: stable    Disposition: Home or Self Care    Follow Up:  Follow-up Information     Tito Carbone MD In 3 days.    Specialty:  Internal Medicine  Why:  F/U with PCP within 3 days for post hospital evaluation.  Contact information:  [address]   [place] [facility] [address]  [phone]             Pierre Echavarria MD In 1 week.    Specialty:  Cardiology  Why:  F/U with Cardiology within 3 days for post hospital evaluation.  Contact information:  [address] [facility] [address] [phone]                 Patient Instructions:      Diet Cardiac     Notify your health care provider if you experience any of the following:  temperature >100.4     Notify your health care provider if you experience any of the following:  persistent nausea and vomiting or diarrhea     Notify your health care provider if you experience any of the following:  severe uncontrolled pain     Notify your health care provider if you experience any of the following:  difficulty breathing or increased cough     Notify your health care provider if you experience any of the following:  persistent dizziness, light-headedness, or visual disturbances     Activity as tolerated       Significant Diagnostic Studies: Labs:   BMP:   Recent Labs   Lab 12/13/18  0543 12/14/18  0638   * 199*    139   K 4.4 3.9   * 109    CO2 18* 21*   BUN 13 11   CREATININE 0.9 0.8   CALCIUM 8.9 9.2   MG 2.1 2.2   , CMP   Recent Labs   Lab 12/13/18  0543 12/14/18  0638    139   K 4.4 3.9   * 109   CO2 18* 21*   * 199*   BUN 13 11   CREATININE 0.9 0.8   CALCIUM 8.9 9.2   ANIONGAP 8 9   ESTGFRAFRICA >60 >60   EGFRNONAA >60 >60    and All labs within the past 24 hours have been reviewed    Pending Diagnostic Studies:     Procedure Component Value Units Date/Time    IR Heart Cath Images [[id number]] Resulted:  12/14/18 0837    Order Status:  Sent Lab Status:  In process Updated:  12/14/18 0925         Medications:  Reconciled Home Medications:      Medication List      START taking these medications    amLODIPine 2.5 MG tablet  Commonly known as:  NORVASC  Take 1 tablet (2.5 mg total) by mouth once daily.  Start taking on:  12/15/2018     aspirin 81 MG EC tablet  Commonly known as:  ECOTRIN  Take 1 tablet (81 mg total) by mouth once daily.  Start taking on:  12/15/2018     metoprolol tartrate 25 MG tablet  Commonly known as:  LOPRESSOR  Take 0.5 tablets (12.5 mg total) by mouth 2 (two) times daily.        CONTINUE taking these medications    busPIRone 10 MG tablet  Commonly known as:  BUSPAR  Take 10 mg by mouth 2 (two) times daily.     carBAMazepine 200 mg tablet  Commonly known as:  TEGRETOL  Take 200 mg by mouth every evening.     ELIQUIS 5 mg Tab  Generic drug:  apixaban  Take 5 mg by mouth 2 (two) times daily.     ezetimibe 10 mg tablet  Commonly known as:  ZETIA  Take 10 mg by mouth once daily.     JANUVIA 100 MG Tab  Generic drug:  SITagliptin  Take 100 mg by mouth once daily.     VITAMIN B-12 2,500 mcg Subl  Generic drug:  cyanocobalamin (vitamin B-12)  Place 2,500 mcg under the tongue once daily.            Indwelling Lines/Drains at time of discharge:   Lines/Drains/Airways          None          Time spent on the discharge of patient: Greater than 30 minutes. Patient was seen and examined on the date of discharge and  determined to be suitable for discharge.         ИВАН Crawford  Department of Hospital Medicine  [facility]

## 2018-12-14 NOTE — DISCHARGE INSTRUCTIONS
"Post-op Heart Catheterization    1. DIET: It is advisable for you to follow a diet that limits the intake of salt, sugar, saturated fats and cholesterol.     2. DRIVING: Due to sedation you received during your procedure, DO NOT drive or operate machinery for 24 hours. Avoid making critical decisions or signing legal documents until tomorrow.    3. ACTIVITY: AVOID activities that require bending of the affected arm/wrist for 3 days and submerging the site in water for 3 days. REMOVE the dressing the day after  the procedure,SHOWER AND APPLY BANDAID TO SITE. WEAR THE ARMBOARD UNTIL TOMORROW NIGHT.  You may RESUME your normal activities or prescribed exercise program as instructed by your physician after 3 days.                                                                                                                 4. WOUND CARE: It is not unusual to have a small amount of bruising to appear at or near the puncture site. It is also common to have a tender "knot" develop beneath the skin at the puncture site of the wrist/arm. This is usually scar tissue and is not a cause for concern or alarm. This tender knot may take several weeks to fully resolve. The bruise will usually spread over several days. However, if the lump gets bigger, call your doctor immediately.    5. DISCOMFORT: For general discomfort at the puncture site, you may take 1 or 2 Acetaminophen (Tylenol) tablets every 4 - 6 hours as needed. (Do not take more than 4000 mg a day)    6. SIGNS AND SYMPTOMS TO REPORT:  Call your physician immediately if any of the following occur:                                            1. Loss of feeling, warmth or color to the affected arm/wrist                                                                                                          2. Mild beeding from the site                 3. Pain that is sudden, sharp or persistent in the affected arm/wrist                 4. Swelling or a change in "lump" " size, increased redness or drainage at                               the puncture site                                                                               5. High fever (101 degrees or higher)    7. GO TO  THE EMERGENCY ROOM OR CALL 911 IF YOU HAVE: Chest pains or discomforts not relieved with 3 nitroglycerin doses (sublingual tablets or spray), numbness or severe pain of limb, if your limb becomes cold or discolored or if you develop uncontrolled bleeding from the puncture site (quickly apply firm, direct pressure above the site).

## 2018-12-14 NOTE — PROGRESS NOTES
"[facility] Medicine  Progress Note    Patient Name: Keyhsa Dewey  MRN: [id number]  Patient Class: IP- Inpatient   Admission Date: 12/12/2018  Length of Stay: 1 days  Attending Physician: Orion Grimaldo MD  Primary Care Provider: Tito Carbone MD        Subjective:     Principal Problem:Elevated troponin    HPI:  Keysha Dewey is a 63 year old female with atrial fibrillation on Eliquis, DM II and bipolar disorder who presented to the ED with complaints of substernal chest pain. The pain began during an argument with her sister. It radiated into her back and both arms became weak.  There was associated SOB. The patient denies nausea and diaphoresis. She denies prior history of chest pain, but sees Dr. Echavarria at  Cardiology. She reports having a negative stress test on 8/29/18. These results were confirmed with obtained records from Dr. Echavarria. Work up in the ED was essentially negative with the exception persistent oxygen saturations in the low 90s.     Hospital Course:  The patient was placed in Observation due to complaints of chest pain and an elevated troponin. Her troponin trended from 0.2 to 1.11. Due to the upward trend and abnormal findings of echocardiogram, a left heart catheterization is planned for 12/14/18. The patient denies any further chest pain, but reports "feeling jittery" after her evening medications last night. Upon further discussion, she reports having those same symptoms after taking Lipitor in the past.     Interval History: Her troponin trended from 0.2 to 1.11. Due to the upward trend and abnormal findings of echocardiogram, a left heart catheterization is planned for 12/14/18. The patient denies any further chest pain, but reports "feeling jittery" after her evening medications last night. Upon further discussion, she reports having those same symptoms after taking Lipitor in the past. Her adverse reaction to Lipitor was added to her medical list.     Review " of Systems   Constitutional: Negative for appetite change, chills, diaphoresis, fatigue and fever.   HENT: Negative for congestion, ear pain, mouth sores, sore throat and trouble swallowing.    Eyes: Negative for pain and visual disturbance.   Respiratory: Positive for shortness of breath. Negative for cough and chest tightness.    Cardiovascular: Positive for chest pain. Negative for palpitations and leg swelling.   Gastrointestinal: Negative for abdominal pain, constipation and nausea.   Endocrine: Negative for cold intolerance, heat intolerance, polydipsia and polyuria.   Genitourinary: Negative for dysuria, frequency and hematuria.   Musculoskeletal: Positive for back pain. Negative for arthralgias, myalgias and neck pain.   Skin: Negative for pallor, rash and wound.   Allergic/Immunologic: Negative for environmental allergies and immunocompromised state.   Neurological: Negative for dizziness, seizures, syncope, weakness, numbness and headaches.   Hematological: Negative for adenopathy. Does not bruise/bleed easily.   Psychiatric/Behavioral: Negative for agitation, confusion and sleep disturbance.     Objective:     Vital Signs (Most Recent):  Temp: 98.5 °F (36.9 °C) (12/14/18 1300)  Pulse: 68 (12/14/18 1313)  Resp: 20 (12/14/18 1300)  BP: 120/63 (12/14/18 1300)  SpO2: (!) 93 % (12/14/18 1300) Vital Signs (24h Range):  Temp:  [97 °F (36.1 °C)-100.3 °F (37.9 °C)] 98.5 °F (36.9 °C)  Pulse:  [66-91] 68  Resp:  [16-21] 20  SpO2:  [92 %-95 %] 93 %  BP: (107-140)/(53-83) 120/63     Weight: 114.5 kg (252 lb 6.8 oz)  Body mass index is 43.33 kg/m².    Intake/Output Summary (Last 24 hours) at 12/14/2018 1330  Last data filed at 12/14/2018 1200  Gross per 24 hour   Intake 240 ml   Output --   Net 240 ml      Physical Exam   Constitutional: She is oriented to person, place, and time. She appears well-developed and well-nourished. No distress.   HENT:   Head: Normocephalic and atraumatic.   Eyes: Conjunctivae are normal.    PERRL   Neck: Neck supple. No JVD present.   Cardiovascular: Normal rate, regular rhythm and normal heart sounds.   Pulmonary/Chest: Effort normal and breath sounds normal. She has in the right lower field and the left lower field.   Abdominal: Soft. Bowel sounds are normal. She exhibits no distension. There is no tenderness.   Musculoskeletal: Normal range of motion. She exhibits edema.   Neurological: She is alert and oriented to person, place, and time.   Skin: Skin is warm and dry.   Psychiatric: She has a normal mood and affect. Her behavior is normal. Thought content normal.   Nursing note and vitals reviewed.      Significant Labs:   CBC:   Recent Labs   Lab 12/13/18  0543 12/14/18  0638   WBC 10.46 10.71   HGB 11.0* 11.9*   HCT 35.2* 37.1    236     CMP:   Recent Labs   Lab 12/13/18  0543 12/14/18  0638    139   K 4.4 3.9   * 109   CO2 18* 21*   * 199*   BUN 13 11   CREATININE 0.9 0.8   CALCIUM 8.9 9.2   ANIONGAP 8 9   EGFRNONAA >60 >60     Troponin:   Recent Labs   Lab 12/13/18  0854 12/13/18  1025 12/13/18  1629   TROPONINI 1.111* 1.087* 1.006*     All pertinent labs within the past 24 hours have been reviewed.    Significant Imaging: I have reviewed all pertinent imaging results/findings within the past 24 hours.   Imaging Results          X-Ray Chest AP Portable (Final result)  Result time 12/12/18 13:12:22    Final result by WAYNE Flynn Sr., MD (12/12/18 13:12:22)                 Impression:      1. The lungs are clear.  2. There are mild osteoarthritic changes in the right acromioclavicular joint.  .      Electronically signed by: Bharathi Flynn MD  Date:    12/12/2018  Time:    13:12             Narrative:    EXAMINATION:  XR CHEST AP PORTABLE    CLINICAL HISTORY:  Chest Pain;    COMPARISON:  08/17/2016    FINDINGS:  The size of the heart is normal. The lungs are clear. There is no pneumothorax.  The costophrenic angles are sharp.  There are mild osteoarthritic  changes in the right acromioclavicular joint.                              Assessment/Plan:      * Elevated troponin    -[facility] planned 12/14/18.   -Continue to trend troponin.  -2D echocardiogram results pending.  -Continue ASA and metoprolol.  -Decision regarding addition of second antiplatelet agent pending [facility] results.   -Statin held due to adverse reaction. ACE held due to hypotension.   -The patient was evaluated for cardiac rehab and is not a candidate at this time. She will be referred, if appropriate.     -Cardiology following.       Obesity hypoventilation syndrome    -Likely the cause of patient's chronic hypoxia.   -Oxygen as needed.   -Outpatient Pulmonology follow up.   -Educated on the importance of proper nutrition and caloric intake and the detrimental side effects of morbid obesity.          Bipolar disorder    Continue buspirone and carbamazepine.          Type 2 diabetes mellitus, without long-term current use of insulin    -NISS  -Hold Januvia  -ADA diet.  -Check Hemoglobin A1C.         VTE Risk Mitigation (From admission, onward)    Heparin              ИВАН Crawford  Department of Hospital Medicine   [facility]

## 2018-12-14 NOTE — PROGRESS NOTES
Clinical Pharmacy Progress Note: Telemetry Pharmacist Rounding     Patient was counseled by pharmacist on the telemetry pharmacist availability to discuss new medications, side effects, and reasons for changes in medication during admission.   Asked if patient had any medication questions at this time.   Instructed patient to use call light with any questions or concerns to discuss with pharmacy during the admission.   Offered bedside medication delivery to patient.   Patient expressed understanding and had no further questions.    Thank you for allowing us to participate in this patient's care.    Keysha Palmer, PharmD 12/14/2018 10:37 AM

## 2018-12-15 NOTE — PLAN OF CARE
12/14/18 1851   Final Note   Assessment Type Final Discharge Note   Anticipated Discharge Disposition Home   Right Care Referral Info   Post Acute Recommendation No Care

## 2018-12-19 ENCOUNTER — OFFICE VISIT (OUTPATIENT)
Dept: CARDIOLOGY | Facility: CLINIC | Age: 63
End: 2018-12-19
Payer: COMMERCIAL

## 2018-12-19 VITALS
BODY MASS INDEX: 42.56 KG/M2 | HEIGHT: 64 IN | HEART RATE: 64 BPM | DIASTOLIC BLOOD PRESSURE: 70 MMHG | WEIGHT: 249.31 LBS | SYSTOLIC BLOOD PRESSURE: 130 MMHG

## 2018-12-19 DIAGNOSIS — R93.1 ABNORMAL ECHOCARDIOGRAM: ICD-10-CM

## 2018-12-19 DIAGNOSIS — I42.0 CARDIOMYOPATHY, DILATED: Primary | ICD-10-CM

## 2018-12-19 DIAGNOSIS — F31.9 BIPOLAR AFFECTIVE DISORDER, REMISSION STATUS UNSPECIFIED: Chronic | ICD-10-CM

## 2018-12-19 DIAGNOSIS — E11.9 TYPE 2 DIABETES MELLITUS WITHOUT COMPLICATION, WITHOUT LONG-TERM CURRENT USE OF INSULIN: ICD-10-CM

## 2018-12-19 PROCEDURE — 3008F BODY MASS INDEX DOCD: CPT | Mod: CPTII,S$GLB,, | Performed by: INTERNAL MEDICINE

## 2018-12-19 PROCEDURE — 3046F HEMOGLOBIN A1C LEVEL >9.0%: CPT | Mod: CPTII,S$GLB,, | Performed by: INTERNAL MEDICINE

## 2018-12-19 PROCEDURE — 99999 PR PBB SHADOW E&M-EST. PATIENT-LVL III: CPT | Mod: PBBFAC,,, | Performed by: INTERNAL MEDICINE

## 2018-12-19 PROCEDURE — 99214 OFFICE O/P EST MOD 30 MIN: CPT | Mod: S$GLB,,, | Performed by: INTERNAL MEDICINE

## 2018-12-19 NOTE — PROGRESS NOTES
Subjective:   Patient ID:  Keysha Dewey is a 63 y.o. female who presents for follow up of Hypertension (f/u hrt cath)      HPI  A 62 yo female with bipolar disorder s/p admission fro elevated troponin and chest pain had heart cath thatw as negative she has afib on eliquis her ef is in the 35% she had cough from ace and could not tolerate valsartan. She is on b blockers and amlodipine eliquis. She is doing well  Clinically.no chf symptoms angina or palpitation. She snores at nite she needs sleep eval.  Past Medical History:   Diagnosis Date    Atrial fibrillation     Bipolar disorder     DM w/o complication type II        Past Surgical History:   Procedure Laterality Date    CARDIAC CATHETERIZATION      GASTRIC BYPASS  2006    HYSTERECTOMY  2002       Social History     Tobacco Use    Smoking status: Never Smoker   Substance Use Topics    Alcohol use: No    Drug use: No       History reviewed. No pertinent family history.    Current Outpatient Medications   Medication Sig    amLODIPine (NORVASC) 2.5 MG tablet Take 1 tablet (2.5 mg total) by mouth once daily.    apixaban (ELIQUIS) 5 mg Tab Take 5 mg by mouth 2 (two) times daily.    aspirin (ECOTRIN) 81 MG EC tablet Take 1 tablet (81 mg total) by mouth once daily.    busPIRone (BUSPAR) 10 MG tablet Take 10 mg by mouth 2 (two) times daily.    carBAMazepine (TEGRETOL) 200 mg tablet Take 200 mg by mouth every evening. Takes one and one-half tablet at night    cyanocobalamin, vitamin B-12, (VITAMIN B-12) 2,500 mcg Subl Place 2,500 mcg under the tongue once daily.    ezetimibe (ZETIA) 10 mg tablet Take 10 mg by mouth once daily.    metoprolol tartrate (LOPRESSOR) 25 MG tablet Take 0.5 tablets (12.5 mg total) by mouth 2 (two) times daily.    multivitamin Chew Take by mouth.    SITagliptin (JANUVIA) 100 MG Tab Take 100 mg by mouth once daily.    turmeric, bulk, 95 % Powd by Misc.(Non-Drug; Combo Route) route.     No current facility-administered  medications for this visit.      Current Outpatient Medications on File Prior to Visit   Medication Sig    amLODIPine (NORVASC) 2.5 MG tablet Take 1 tablet (2.5 mg total) by mouth once daily.    apixaban (ELIQUIS) 5 mg Tab Take 5 mg by mouth 2 (two) times daily.    aspirin (ECOTRIN) 81 MG EC tablet Take 1 tablet (81 mg total) by mouth once daily.    busPIRone (BUSPAR) 10 MG tablet Take 10 mg by mouth 2 (two) times daily.    carBAMazepine (TEGRETOL) 200 mg tablet Take 200 mg by mouth every evening. Takes one and one-half tablet at night    cyanocobalamin, vitamin B-12, (VITAMIN B-12) 2,500 mcg Subl Place 2,500 mcg under the tongue once daily.    ezetimibe (ZETIA) 10 mg tablet Take 10 mg by mouth once daily.    metoprolol tartrate (LOPRESSOR) 25 MG tablet Take 0.5 tablets (12.5 mg total) by mouth 2 (two) times daily.    multivitamin Chew Take by mouth.    SITagliptin (JANUVIA) 100 MG Tab Take 100 mg by mouth once daily.    turmeric, bulk, 95 % Powd by Misc.(Non-Drug; Combo Route) route.     No current facility-administered medications on file prior to visit.      Review of patient's allergies indicates:   Allergen Reactions    Lipitor [atorvastatin]        Review of Systems   Constitution: Negative for diaphoresis, weakness, malaise/fatigue and weight gain.   HENT: Negative for hoarse voice.    Eyes: Negative for double vision and visual disturbance.   Cardiovascular: Negative for chest pain, claudication, cyanosis, dyspnea on exertion, irregular heartbeat, leg swelling, near-syncope, orthopnea, palpitations, paroxysmal nocturnal dyspnea and syncope.   Respiratory: Negative for cough, hemoptysis, shortness of breath and snoring.    Hematologic/Lymphatic: Negative for bleeding problem. Does not bruise/bleed easily.   Skin: Negative for color change and poor wound healing.   Musculoskeletal: Negative for muscle cramps, muscle weakness and myalgias.   Gastrointestinal: Negative for bloating, abdominal pain,  change in bowel habit, diarrhea, heartburn, hematemesis, hematochezia, melena and nausea.   Neurological: Negative for excessive daytime sleepiness, dizziness, headaches, light-headedness, loss of balance and numbness.   Psychiatric/Behavioral: Negative for memory loss. The patient does not have insomnia.    Allergic/Immunologic: Negative for hives.       Objective:   Physical Exam   Constitutional: She is oriented to person, place, and time. She appears well-developed and well-nourished. She does not appear ill. No distress.   HENT:   Head: Normocephalic and atraumatic.   Eyes: EOM are normal. Pupils are equal, round, and reactive to light. No scleral icterus.   Neck: Normal range of motion. Neck supple. Normal carotid pulses, no hepatojugular reflux and no JVD present. Carotid bruit is not present. No tracheal deviation present. No thyromegaly present.   Cardiovascular: Normal rate, regular rhythm, normal heart sounds, intact distal pulses and normal pulses. Exam reveals no gallop and no friction rub.   No murmur heard.  Pulmonary/Chest: Effort normal and breath sounds normal. No respiratory distress. She has no wheezes. She has no rhonchi. She has no rales. She exhibits no tenderness.   Abdominal: Soft. Normal appearance, normal aorta and bowel sounds are normal. She exhibits no abdominal bruit, no ascites and no pulsatile midline mass. There is no hepatomegaly. There is no tenderness.   Musculoskeletal: She exhibits no edema.        Right shoulder: She exhibits no deformity.   Neurological: She is alert and oriented to person, place, and time. She has normal strength. No cranial nerve deficit. Coordination normal.   Skin: Skin is warm and dry. No rash noted. She is not diaphoretic. No cyanosis or erythema. Nails show no clubbing.   Psychiatric: She has a normal mood and affect. Her speech is normal and behavior is normal.   Nursing note and vitals reviewed.    Vitals:    12/19/18 1641   BP: 130/70   BP Method:  "Large (Manual)   Pulse: 64   Weight: 113.1 kg (249 lb 5.4 oz)   Height: 5' 4" (1.626 m)     Lab Results   Component Value Date    CHOL 173 12/13/2018     Lab Results   Component Value Date    HDL 42 12/13/2018     Lab Results   Component Value Date    LDLCALC 100.4 12/13/2018     Lab Results   Component Value Date    TRIG 153 (H) 12/13/2018     Lab Results   Component Value Date    CHOLHDL 24.3 12/13/2018       Chemistry        Component Value Date/Time     12/14/2018 0638    K 3.9 12/14/2018 0638     12/14/2018 0638    CO2 21 (L) 12/14/2018 0638    BUN 11 12/14/2018 0638    CREATININE 0.8 12/14/2018 0638     (H) 12/14/2018 0638        Component Value Date/Time    CALCIUM 9.2 12/14/2018 0638    ALKPHOS 96 12/12/2018 1236    AST 19 12/12/2018 1236    ALT 15 12/12/2018 1236    BILITOT 0.3 12/12/2018 1236    ESTGFRAFRICA >60 12/14/2018 0638    EGFRNONAA >60 12/14/2018 0638          No results found for: TSH  Lab Results   Component Value Date    INR 1.0 12/13/2018    INR 1.0 08/17/2016     Lab Results   Component Value Date    WBC 10.71 12/14/2018    HGB 11.9 (L) 12/14/2018    HCT 37.1 12/14/2018    MCV 78 (L) 12/14/2018     12/14/2018     BMP  Sodium   Date Value Ref Range Status   12/14/2018 139 136 - 145 mmol/L Final     Potassium   Date Value Ref Range Status   12/14/2018 3.9 3.5 - 5.1 mmol/L Final     Chloride   Date Value Ref Range Status   12/14/2018 109 95 - 110 mmol/L Final     CO2   Date Value Ref Range Status   12/14/2018 21 (L) 23 - 29 mmol/L Final     BUN, Bld   Date Value Ref Range Status   12/14/2018 11 8 - 23 mg/dL Final     Creatinine   Date Value Ref Range Status   12/14/2018 0.8 0.5 - 1.4 mg/dL Final     Calcium   Date Value Ref Range Status   12/14/2018 9.2 8.7 - 10.5 mg/dL Final     Anion Gap   Date Value Ref Range Status   12/14/2018 9 8 - 16 mmol/L Final     eGFR if    Date Value Ref Range Status   12/14/2018 >60 >60 mL/min/1.73 m^2 Final     eGFR if non "    Date Value Ref Range Status   12/14/2018 >60 >60 mL/min/1.73 m^2 Final     Comment:     Calculation used to obtain the estimated glomerular filtration  rate (eGFR) is the CKD-EPI equation.        Estimated Creatinine Clearance: 88.7 mL/min (based on SCr of 0.8 mg/dL).    Assessment:     1. Bipolar affective disorder, remission status unspecified    2. Type 2 diabetes mellitus without complication, without long-term current use of insulin    3. Cardiomyopathy, dilated    4. Abnormal echocardiogram      She will need arb back on board and higher dose of b blcokers she has no chf well compensated because [place] will not make any changes and will get her to see DR REYES back in early January.and reepat echo to see if what occurred was tokatsubo.  Plan:   F/u in early January with [facility]Cho  IN 2-3 WEEKS WITH ME..

## 2019-01-03 ENCOUNTER — TELEPHONE (OUTPATIENT)
Dept: CARDIOLOGY | Facility: CLINIC | Age: 64
End: 2019-01-03

## 2019-01-03 ENCOUNTER — CLINICAL SUPPORT (OUTPATIENT)
Dept: CARDIOLOGY | Facility: CLINIC | Age: 64
End: 2019-01-03
Attending: INTERNAL MEDICINE
Payer: COMMERCIAL

## 2019-01-03 DIAGNOSIS — I42.0 CARDIOMYOPATHY, DILATED: ICD-10-CM

## 2019-01-03 DIAGNOSIS — E11.9 TYPE 2 DIABETES MELLITUS WITHOUT COMPLICATION, WITHOUT LONG-TERM CURRENT USE OF INSULIN: ICD-10-CM

## 2019-01-03 DIAGNOSIS — R93.1 ABNORMAL ECHOCARDIOGRAM: ICD-10-CM

## 2019-01-03 LAB
DIASTOLIC DYSFUNCTION: NO
ESTIMATED PA SYSTOLIC PRESSURE: 20.31
RETIRED EF AND QEF - SEE NOTES: 60 (ref 55–65)

## 2019-01-03 PROCEDURE — 93306 2D ECHO WITH COLOR FLOW DOPPLER: ICD-10-PCS | Mod: S$GLB,,, | Performed by: NUCLEAR MEDICINE

## 2019-01-03 PROCEDURE — 93306 TTE W/DOPPLER COMPLETE: CPT | Mod: S$GLB,,, | Performed by: NUCLEAR MEDICINE

## 2019-01-07 PROBLEM — I10 ESSENTIAL HYPERTENSION: Status: ACTIVE | Noted: 2019-01-07

## 2019-01-07 PROBLEM — I51.7 MILD CONCENTRIC LEFT VENTRICULAR HYPERTROPHY (LVH): Status: ACTIVE | Noted: 2019-01-07

## 2019-01-07 PROBLEM — E78.2 MIXED HYPERLIPIDEMIA: Status: ACTIVE | Noted: 2019-01-07

## 2019-01-07 PROBLEM — I48.0 PAF (PAROXYSMAL ATRIAL FIBRILLATION): Status: ACTIVE | Noted: 2019-01-07

## 2019-01-07 PROBLEM — Z78.9 STATIN INTOLERANCE: Status: ACTIVE | Noted: 2019-01-07

## 2019-01-07 NOTE — PROGRESS NOTES
Subjective:   Patient ID:  Keysha Dewey is a 63 y.o. female who presents for evaluation of No chief complaint on file.      HPI     Mrs. Dewey is a 63 year old female with PMHx of PAF on Eliquis, HTN, HLP, Dm2, DCM, abnormal echo who presents to clinic for follow up. She returns today and states she is doing ok. Denies chest pain, chest discomforts or anginal equivalents. No shortness of breath, COHN or palpitations. Denies orthopnea, PND or abdominal bloating. No leg swelling or claudications. Denies syncope or near syncopal episodes. She traveled to [place] and walked 5 miles on most days without any issues. Reports compliance with medications and dietary restrictions. She is statin intolerant and was recently placed on Zetia due to  on recent lipid panel. No CNS complaints to suggest TIA or CVA today. No signs of abnormal bleeding on Eliquis.         Past Medical History:   Diagnosis Date    Atrial fibrillation     Bipolar disorder     DM w/o complication type II     Essential hypertension 1/7/2019    Mixed hyperlipidemia 1/7/2019       Past Surgical History:   Procedure Laterality Date    CARDIAC CATHETERIZATION      GASTRIC BYPASS  2006    HYSTERECTOMY  2002       Social History     Tobacco Use    Smoking status: Never Smoker   Substance Use Topics    Alcohol use: No    Drug use: No       No family history on file.     Wt Readings from Last 3 Encounters:   12/19/18 113.1 kg (249 lb 5.4 oz)   12/13/18 114.5 kg (252 lb 6.8 oz)     Temp Readings from Last 3 Encounters:   12/14/18 98.5 °F (36.9 °C) (Oral)   08/17/16 97.8 °F (36.6 °C) (Oral)     BP Readings from Last 3 Encounters:   12/19/18 130/70   12/14/18 120/62   08/17/16 (!) 159/97     Pulse Readings from Last 3 Encounters:   12/19/18 64   12/14/18 71   08/17/16 75         Review of Systems   Constitution: Negative for weakness and malaise/fatigue.   HENT: Negative for hearing loss and hoarse voice.    Eyes: Negative for blurred vision and  "visual disturbance.   Cardiovascular: Negative for chest pain, claudication, dyspnea on exertion, irregular heartbeat, leg swelling, near-syncope, orthopnea, palpitations, paroxysmal nocturnal dyspnea and syncope.        Hx of AFIB on Eliquis   Respiratory: Negative for cough, hemoptysis, shortness of breath, sleep disturbances due to breathing, snoring and wheezing.    Endocrine: Negative for cold intolerance and heat intolerance.   Hematologic/Lymphatic: Does not bruise/bleed easily.   Skin: Negative for color change, dry skin and nail changes.   Musculoskeletal: Positive for arthritis and joint pain (bilateral knee pain). Negative for back pain and myalgias.   Gastrointestinal: Negative for bloating, abdominal pain, constipation, nausea and vomiting.   Genitourinary: Negative for dysuria, flank pain, hematuria and hesitancy.   Neurological: Negative for headaches, light-headedness, loss of balance, numbness and paresthesias.   Psychiatric/Behavioral: Negative for altered mental status.   Allergic/Immunologic: Negative for environmental allergies.     /82   Pulse (!) 57   Ht 5' 4" (1.626 m)   Wt 111.3 kg (245 lb 6 oz)   LMP  (LMP Unknown)   SpO2 98%   BMI 42.12 kg/m²     Objective:   Physical Exam   Constitutional: She is oriented to person, place, and time. She appears well-developed and well-nourished. No distress.   HENT:   Head: Normocephalic and atraumatic.   Eyes: Pupils are equal, round, and reactive to light.   Neck: Normal range of motion and full passive range of motion without pain. Neck supple. No JVD present.   Cardiovascular: Regular rhythm, S1 normal, S2 normal and intact distal pulses. Bradycardia present. PMI is not displaced. Exam reveals no distant heart sounds.   No murmur heard.  Pulses:       Radial pulses are 2+ on the right side, and 2+ on the left side.        Dorsalis pedis pulses are 2+ on the right side, and 2+ on the left side.   Hx of PAF   Pulmonary/Chest: Effort normal. " No accessory muscle usage. No respiratory distress. She has decreased breath sounds in the right lower field and the left lower field. She has no wheezes. She has no rales.   Abdominal: Soft. Bowel sounds are normal. She exhibits no distension. There is no tenderness.   obese   Musculoskeletal: Normal range of motion. She exhibits no edema.        Right ankle: She exhibits no swelling.        Left ankle: She exhibits no swelling.   Neurological: She is alert and oriented to person, place, and time.   Skin: Skin is warm and dry. She is not diaphoretic. No cyanosis. Nails show no clubbing.   Psychiatric: She has a normal mood and affect. Her speech is normal and behavior is normal. Judgment and thought content normal. Cognition and memory are normal.   Nursing note and vitals reviewed.      Lab Results   Component Value Date    CHOL 173 12/13/2018     Lab Results   Component Value Date    HDL 42 12/13/2018     Lab Results   Component Value Date    LDLCALC 100.4 12/13/2018     Lab Results   Component Value Date    TRIG 153 (H) 12/13/2018     Lab Results   Component Value Date    CHOLHDL 24.3 12/13/2018       Chemistry        Component Value Date/Time     12/14/2018 0638    K 3.9 12/14/2018 0638     12/14/2018 0638    CO2 21 (L) 12/14/2018 0638    BUN 11 12/14/2018 0638    CREATININE 0.8 12/14/2018 0638     (H) 12/14/2018 0638        Component Value Date/Time    CALCIUM 9.2 12/14/2018 0638    ALKPHOS 96 12/12/2018 1236    AST 19 12/12/2018 1236    ALT 15 12/12/2018 1236    BILITOT 0.3 12/12/2018 1236    ESTGFRAFRICA >60 12/14/2018 0638    EGFRNONAA >60 12/14/2018 0638          No results found for: TSH  Lab Results   Component Value Date    INR 1.0 12/13/2018    INR 1.0 08/17/2016     Lab Results   Component Value Date    WBC 10.71 12/14/2018    HGB 11.9 (L) 12/14/2018    HCT 37.1 12/14/2018    MCV 78 (L) 12/14/2018     12/14/2018   2D ECHO TEST DESCRIPTION       Aorta: The aortic root is  normal in size, measuring 2.8 cm at sinotubular junction and 2.9 cm at Sinuses of Valsalva. The proximal ascending aorta is normal in size, measuring 2.7 cm across.     Left Atrium: The left atrial volume index is normal, measuring 28.33 cc/m2.     Left Ventricle: The left ventricle is normal in size, with an end-diastolic diameter of 5.0 cm, and an end-systolic diameter of 3.2 cm. Wall thickness is mildly increased, with the septum and the posterior wall each measuring 1.2 cm across. Relative wall   thickness was increased at 0.48, and the LV mass index was increased at 129.2 g/m2 consistent with mild concentric left ventricular hypertrophy. There are no regional wall motion abnormalities. Left ventricular systolic function appears normal. Visually   estimated ejection fraction is 60-65%. The LV Doppler derived stroke volume equals 76.0 ccs.     Diastolic indices: E wave velocity 0.8 m/s, E/A ratio 0.8,  msec., E/e' ratio(avg) 8. Diastolic function is normal.     Right Atrium: The right atrium is normal in size, measuring 5.3 cm in length and 4.0 cm in width in the apical view.     Right Ventricle: The right ventricle is normal in size measuring 3.0 cm at the base in the apical right ventricle-focused view. Global right ventricular systolic function appears normal. The estimated PA systolic pressure is 20 mmHg.     Aortic Valve:  Aortic valve is normal in structure with normal leaflet mobility.     Mitral Valve:  Mitral valve is normal in structure with normal leaflet mobility.     Tricuspid Valve:  Tricuspid valve is normal in structure with normal leaflet mobility.     Pulmonary Valve:  Pulmonary valve is normal in structure with normal leaflet mobility.     IVC: IVC is normal in size and collapses > 50% with a sniff, suggesting normal right atrial pressure of 3 mmHg.     Intracavitary: There is no evidence of pericardial effusion, intracavity mass, thrombi, or vegetation.         CONCLUSIONS     1 -  Concentric hypertrophy.     2 - No wall motion abnormalities.     3 - Normal left ventricular systolic function (EF 60-65%).     4 - Normal left ventricular diastolic function.     5 - Normal right ventricular systolic function .     6 - The estimated PA systolic pressure is 20 mmHg.             This document has been electronically    SIGNED BY: Jovani Schmitt MD On: 01/03/2019 13:29     Assessment:      1. Abnormal echocardiogram    2. PAF (paroxysmal atrial fibrillation)    3. Essential hypertension    4. Mixed hyperlipidemia    5. Cardiomyopathy, dilated    6. Type 2 diabetes mellitus without complication, without long-term current use of insulin      Patient presents to clinic for follow up and is doing well. NO new cardiac complaints today. Recent EF improved to 60-65%, -WMA's.   Denies chest pain or anginal equivalents. NO SOB, COHN or palpitations.   NO signs of ADHF on exam today.  Tolerating CV meds well today.   NO CNS complaints to suggest TIA or CVA today.   No signs of abnormal bleeding on Eliquis and ASA.  Plan:     Continue same CV meds for now  DASH diet, 2 gm sodium restriction  1.5L Fluid intake per day  Encourage weight loss  Encourage regular physical exercise as tolerated due to bilateral knee pain  Follow up with Dr. Faith in 3 months with CMP, Lipids, A1C or sooner if needed.

## 2019-01-08 ENCOUNTER — OFFICE VISIT (OUTPATIENT)
Dept: CARDIOLOGY | Facility: CLINIC | Age: 64
End: 2019-01-08
Payer: COMMERCIAL

## 2019-01-08 VITALS
WEIGHT: 245.38 LBS | SYSTOLIC BLOOD PRESSURE: 118 MMHG | HEART RATE: 57 BPM | DIASTOLIC BLOOD PRESSURE: 82 MMHG | BODY MASS INDEX: 41.89 KG/M2 | OXYGEN SATURATION: 98 % | HEIGHT: 64 IN

## 2019-01-08 DIAGNOSIS — I42.0 CARDIOMYOPATHY, DILATED: ICD-10-CM

## 2019-01-08 DIAGNOSIS — I48.0 PAF (PAROXYSMAL ATRIAL FIBRILLATION): ICD-10-CM

## 2019-01-08 DIAGNOSIS — E11.9 TYPE 2 DIABETES MELLITUS WITHOUT COMPLICATION, WITHOUT LONG-TERM CURRENT USE OF INSULIN: ICD-10-CM

## 2019-01-08 DIAGNOSIS — R93.1 ABNORMAL ECHOCARDIOGRAM: Primary | ICD-10-CM

## 2019-01-08 DIAGNOSIS — I10 ESSENTIAL HYPERTENSION: ICD-10-CM

## 2019-01-08 DIAGNOSIS — I51.7 MILD CONCENTRIC LEFT VENTRICULAR HYPERTROPHY (LVH): ICD-10-CM

## 2019-01-08 DIAGNOSIS — Z78.9 STATIN INTOLERANCE: ICD-10-CM

## 2019-01-08 DIAGNOSIS — E78.2 MIXED HYPERLIPIDEMIA: ICD-10-CM

## 2019-01-08 PROCEDURE — 3079F PR MOST RECENT DIASTOLIC BLOOD PRESSURE 80-89 MM HG: ICD-10-PCS | Mod: CPTII,S$GLB,, | Performed by: NURSE PRACTITIONER

## 2019-01-08 PROCEDURE — 3074F SYST BP LT 130 MM HG: CPT | Mod: CPTII,S$GLB,, | Performed by: NURSE PRACTITIONER

## 2019-01-08 PROCEDURE — 99213 OFFICE O/P EST LOW 20 MIN: CPT | Mod: S$GLB,,, | Performed by: NURSE PRACTITIONER

## 2019-01-08 PROCEDURE — 3046F PR MOST RECENT HEMOGLOBIN A1C LEVEL > 9.0%: ICD-10-PCS | Mod: CPTII,S$GLB,, | Performed by: NURSE PRACTITIONER

## 2019-01-08 PROCEDURE — 99213 PR OFFICE/OUTPT VISIT, EST, LEVL III, 20-29 MIN: ICD-10-PCS | Mod: S$GLB,,, | Performed by: NURSE PRACTITIONER

## 2019-01-08 PROCEDURE — 3079F DIAST BP 80-89 MM HG: CPT | Mod: CPTII,S$GLB,, | Performed by: NURSE PRACTITIONER

## 2019-01-08 PROCEDURE — 99999 PR PBB SHADOW E&M-EST. PATIENT-LVL III: ICD-10-PCS | Mod: PBBFAC,,, | Performed by: NURSE PRACTITIONER

## 2019-01-08 PROCEDURE — 3008F BODY MASS INDEX DOCD: CPT | Mod: CPTII,S$GLB,, | Performed by: NURSE PRACTITIONER

## 2019-01-08 PROCEDURE — 99999 PR PBB SHADOW E&M-EST. PATIENT-LVL III: CPT | Mod: PBBFAC,,, | Performed by: NURSE PRACTITIONER

## 2019-01-08 PROCEDURE — 3074F PR MOST RECENT SYSTOLIC BLOOD PRESSURE < 130 MM HG: ICD-10-PCS | Mod: CPTII,S$GLB,, | Performed by: NURSE PRACTITIONER

## 2019-01-08 PROCEDURE — 3046F HEMOGLOBIN A1C LEVEL >9.0%: CPT | Mod: CPTII,S$GLB,, | Performed by: NURSE PRACTITIONER

## 2019-01-08 PROCEDURE — 3008F PR BODY MASS INDEX (BMI) DOCUMENTED: ICD-10-PCS | Mod: CPTII,S$GLB,, | Performed by: NURSE PRACTITIONER

## 2019-01-08 RX ORDER — EZETIMIBE 10 MG/1
10 TABLET ORAL DAILY
Qty: 90 TABLET | Refills: 3 | Status: SHIPPED | OUTPATIENT
Start: 2019-01-08 | End: 2019-08-23 | Stop reason: SDUPTHER

## 2019-01-08 RX ORDER — AMLODIPINE BESYLATE 2.5 MG/1
2.5 TABLET ORAL DAILY
Qty: 90 TABLET | Refills: 3 | Status: SHIPPED | OUTPATIENT
Start: 2019-01-08 | End: 2019-12-29

## 2019-01-08 RX ORDER — VALSARTAN AND HYDROCHLOROTHIAZIDE 80; 12.5 MG/1; MG/1
1 TABLET, FILM COATED ORAL DAILY
COMMUNITY
End: 2019-08-21 | Stop reason: SDUPTHER

## 2019-01-08 RX ORDER — METOPROLOL TARTRATE 25 MG/1
12.5 TABLET, FILM COATED ORAL 2 TIMES DAILY
Qty: 90 TABLET | Refills: 3 | Status: SHIPPED | OUTPATIENT
Start: 2019-01-08 | End: 2019-12-29

## 2019-04-04 ENCOUNTER — LAB VISIT (OUTPATIENT)
Dept: LAB | Facility: HOSPITAL | Age: 64
End: 2019-04-04
Attending: NURSE PRACTITIONER
Payer: COMMERCIAL

## 2019-04-04 DIAGNOSIS — E11.9 TYPE 2 DIABETES MELLITUS WITHOUT COMPLICATION, WITHOUT LONG-TERM CURRENT USE OF INSULIN: ICD-10-CM

## 2019-04-04 DIAGNOSIS — Z78.9 STATIN INTOLERANCE: ICD-10-CM

## 2019-04-04 DIAGNOSIS — I10 ESSENTIAL HYPERTENSION: ICD-10-CM

## 2019-04-04 LAB
ALBUMIN SERPL BCP-MCNC: 3.5 G/DL (ref 3.5–5.2)
ALP SERPL-CCNC: 87 U/L (ref 55–135)
ALT SERPL W/O P-5'-P-CCNC: 14 U/L (ref 10–44)
ANION GAP SERPL CALC-SCNC: 11 MMOL/L (ref 8–16)
AST SERPL-CCNC: 15 U/L (ref 10–40)
BILIRUB SERPL-MCNC: 0.3 MG/DL (ref 0.1–1)
BUN SERPL-MCNC: 14 MG/DL (ref 8–23)
CALCIUM SERPL-MCNC: 9.5 MG/DL (ref 8.7–10.5)
CHLORIDE SERPL-SCNC: 106 MMOL/L (ref 95–110)
CHOLEST SERPL-MCNC: 175 MG/DL (ref 120–199)
CHOLEST/HDLC SERPL: 3.6 {RATIO} (ref 2–5)
CO2 SERPL-SCNC: 25 MMOL/L (ref 23–29)
CREAT SERPL-MCNC: 0.9 MG/DL (ref 0.5–1.4)
EST. GFR  (AFRICAN AMERICAN): >60 ML/MIN/1.73 M^2
EST. GFR  (NON AFRICAN AMERICAN): >60 ML/MIN/1.73 M^2
ESTIMATED AVG GLUCOSE: 186 MG/DL (ref 68–131)
GLUCOSE SERPL-MCNC: 177 MG/DL (ref 70–110)
HBA1C MFR BLD HPLC: 8.1 % (ref 4–5.6)
HDLC SERPL-MCNC: 48 MG/DL (ref 40–75)
HDLC SERPL: 27.4 % (ref 20–50)
LDLC SERPL CALC-MCNC: 101.4 MG/DL (ref 63–159)
NONHDLC SERPL-MCNC: 127 MG/DL
POTASSIUM SERPL-SCNC: 4.1 MMOL/L (ref 3.5–5.1)
PROT SERPL-MCNC: 6.4 G/DL (ref 6–8.4)
SODIUM SERPL-SCNC: 142 MMOL/L (ref 136–145)
TRIGL SERPL-MCNC: 128 MG/DL (ref 30–150)

## 2019-04-04 PROCEDURE — 36415 COLL VENOUS BLD VENIPUNCTURE: CPT

## 2019-04-04 PROCEDURE — 83036 HEMOGLOBIN GLYCOSYLATED A1C: CPT

## 2019-04-04 PROCEDURE — 80053 COMPREHEN METABOLIC PANEL: CPT

## 2019-04-04 PROCEDURE — 80061 LIPID PANEL: CPT

## 2019-04-11 ENCOUNTER — OFFICE VISIT (OUTPATIENT)
Dept: CARDIOLOGY | Facility: CLINIC | Age: 64
End: 2019-04-11
Payer: COMMERCIAL

## 2019-04-11 VITALS
BODY MASS INDEX: 41.61 KG/M2 | SYSTOLIC BLOOD PRESSURE: 98 MMHG | DIASTOLIC BLOOD PRESSURE: 68 MMHG | HEART RATE: 64 BPM | HEIGHT: 64 IN | WEIGHT: 243.75 LBS

## 2019-04-11 DIAGNOSIS — Z78.9 STATIN INTOLERANCE: ICD-10-CM

## 2019-04-11 DIAGNOSIS — E78.2 MIXED HYPERLIPIDEMIA: ICD-10-CM

## 2019-04-11 DIAGNOSIS — I10 ESSENTIAL HYPERTENSION: ICD-10-CM

## 2019-04-11 DIAGNOSIS — I42.0 CARDIOMYOPATHY, DILATED: Primary | ICD-10-CM

## 2019-04-11 DIAGNOSIS — E66.2 OBESITY HYPOVENTILATION SYNDROME: Chronic | ICD-10-CM

## 2019-04-11 DIAGNOSIS — I51.7 MILD CONCENTRIC LEFT VENTRICULAR HYPERTROPHY (LVH): ICD-10-CM

## 2019-04-11 DIAGNOSIS — R79.89 ELEVATED TROPONIN: ICD-10-CM

## 2019-04-11 DIAGNOSIS — I48.0 PAF (PAROXYSMAL ATRIAL FIBRILLATION): ICD-10-CM

## 2019-04-11 DIAGNOSIS — R93.1 ABNORMAL ECHOCARDIOGRAM: ICD-10-CM

## 2019-04-11 DIAGNOSIS — E11.9 TYPE 2 DIABETES MELLITUS WITHOUT COMPLICATION, WITHOUT LONG-TERM CURRENT USE OF INSULIN: ICD-10-CM

## 2019-04-11 PROCEDURE — 3078F PR MOST RECENT DIASTOLIC BLOOD PRESSURE < 80 MM HG: ICD-10-PCS | Mod: CPTII,S$GLB,, | Performed by: INTERNAL MEDICINE

## 2019-04-11 PROCEDURE — 3078F DIAST BP <80 MM HG: CPT | Mod: CPTII,S$GLB,, | Performed by: INTERNAL MEDICINE

## 2019-04-11 PROCEDURE — 99214 OFFICE O/P EST MOD 30 MIN: CPT | Mod: S$GLB,,, | Performed by: INTERNAL MEDICINE

## 2019-04-11 PROCEDURE — 99999 PR PBB SHADOW E&M-EST. PATIENT-LVL III: CPT | Mod: PBBFAC,,, | Performed by: INTERNAL MEDICINE

## 2019-04-11 PROCEDURE — 99999 PR PBB SHADOW E&M-EST. PATIENT-LVL III: ICD-10-PCS | Mod: PBBFAC,,, | Performed by: INTERNAL MEDICINE

## 2019-04-11 PROCEDURE — 3008F BODY MASS INDEX DOCD: CPT | Mod: CPTII,S$GLB,, | Performed by: INTERNAL MEDICINE

## 2019-04-11 PROCEDURE — 99214 PR OFFICE/OUTPT VISIT, EST, LEVL IV, 30-39 MIN: ICD-10-PCS | Mod: S$GLB,,, | Performed by: INTERNAL MEDICINE

## 2019-04-11 PROCEDURE — 3045F PR MOST RECENT HEMOGLOBIN A1C LEVEL 7.0-9.0%: ICD-10-PCS | Mod: CPTII,S$GLB,, | Performed by: INTERNAL MEDICINE

## 2019-04-11 PROCEDURE — 3074F PR MOST RECENT SYSTOLIC BLOOD PRESSURE < 130 MM HG: ICD-10-PCS | Mod: CPTII,S$GLB,, | Performed by: INTERNAL MEDICINE

## 2019-04-11 PROCEDURE — 3074F SYST BP LT 130 MM HG: CPT | Mod: CPTII,S$GLB,, | Performed by: INTERNAL MEDICINE

## 2019-04-11 PROCEDURE — 3045F PR MOST RECENT HEMOGLOBIN A1C LEVEL 7.0-9.0%: CPT | Mod: CPTII,S$GLB,, | Performed by: INTERNAL MEDICINE

## 2019-04-11 PROCEDURE — 3008F PR BODY MASS INDEX (BMI) DOCUMENTED: ICD-10-PCS | Mod: CPTII,S$GLB,, | Performed by: INTERNAL MEDICINE

## 2019-04-11 RX ORDER — TIMOLOL MALEATE 5 MG/ML
SOLUTION/ DROPS OPHTHALMIC
COMMUNITY
Start: 2018-11-06 | End: 2019-07-17

## 2019-04-11 RX ORDER — CLOTRIMAZOLE AND BETAMETHASONE DIPROPIONATE 10; .64 MG/G; MG/G
CREAM TOPICAL
COMMUNITY
Start: 2019-01-24 | End: 2019-07-17

## 2019-04-11 RX ORDER — CLOTRIMAZOLE AND BETAMETHASONE DIPROPIONATE 10; .64 MG/G; MG/G
CREAM TOPICAL
Refills: 1 | COMMUNITY
Start: 2019-01-24 | End: 2019-07-17

## 2019-04-11 RX ORDER — NATEGLINIDE 60 MG/1
60 TABLET ORAL
COMMUNITY
Start: 2019-02-14 | End: 2019-08-15

## 2019-04-11 NOTE — PROGRESS NOTES
Subjective:   Patient ID:  Keysha Dewey is a 63 y.o. female who presents for follow up of Follow-up      HPI  A 62 yo obese female with diabetes s/p gastric bypass htn had elevated troponin paf and cardiomyopathy is here for f/u. Has not had her sleep eval.had reepate cho showed normal lvf. She is exercising has no chest pain shortness of breath orthopnea pnd palpitation syncope near syncope. She is on appropriate therapy.  Past Medical History:   Diagnosis Date    Atrial fibrillation     Bipolar disorder     DM w/o complication type II     Essential hypertension 1/7/2019    Mixed hyperlipidemia 1/7/2019       Past Surgical History:   Procedure Laterality Date    CARDIAC CATHETERIZATION      CATHETERIZATION, HEART, LEFT Left 12/14/2018    Performed by [facility]is [facility] at [facility] CATH LAB    GASTRIC BYPASS  2006    HYSTERECTOMY  2002       Social History     Tobacco Use    Smoking status: Never Smoker   Substance Use Topics    Alcohol use: No    Drug use: No       History reviewed. No pertinent family history.    Current Outpatient Medications   Medication Sig    amLODIPine (NORVASC) 2.5 MG tablet Take 1 tablet (2.5 mg total) by mouth once daily.    apixaban (ELIQUIS) 5 mg Tab Take 1 tablet (5 mg total) by mouth 2 (two) times daily.    aspirin (ECOTRIN) 81 MG EC tablet Take 1 tablet (81 mg total) by mouth once daily.    blood sugar diagnostic (FREESTYLE LITE STRIPS) Strp Use to check blood sugar; test 2 times a day, dispense 100 strips    busPIRone (BUSPAR) 10 MG tablet Take 10 mg by mouth 2 (two) times daily.    carBAMazepine (TEGRETOL) 200 mg tablet Take 200 mg by mouth every evening. Takes one and one-half tablet at night    clotrimazole-betamethasone 1-0.05% (LOTRISONE) cream YULISA TO THE AFFECTED EAR ATN FOR 1 MONTH    clotrimazole-betamethasone 1-0.05% (LOTRISONE) cream Apply to affected ear at night for 1 month    cyanocobalamin, vitamin B-12, (VITAMIN B-12) 2,500 mcg Subl Place 2,500  mcg under the tongue once daily.    ezetimibe (ZETIA) 10 mg tablet Take 1 tablet (10 mg total) by mouth once daily.    metoprolol tartrate (LOPRESSOR) 25 MG tablet Take 0.5 tablets (12.5 mg total) by mouth 2 (two) times daily.    multivitamin Chew Take by mouth.    nateglinide (STARLIX) 60 MG tablet Take 60 mg by mouth.    SITagliptin (JANUVIA) 100 MG Tab Take 100 mg by mouth once daily.    timolol (BETIMOL) 0.5 % ophthalmic solution 1 drop 2 (two) times daily.    timolol maleate 0.5% (TIMOPTIC) 0.5 % Drop INSTILL 1 DROP IN BOTH EYES TWICE DAILY    turmeric, bulk, 95 % Powd by Misc.(Non-Drug; Combo Route) route.    valsartan-hydrochlorothiazide (DIOVAN-HCT) 80-12.5 mg per tablet Take 1 tablet by mouth once daily.     No current facility-administered medications for this visit.      Current Outpatient Medications on File Prior to Visit   Medication Sig    amLODIPine (NORVASC) 2.5 MG tablet Take 1 tablet (2.5 mg total) by mouth once daily.    apixaban (ELIQUIS) 5 mg Tab Take 1 tablet (5 mg total) by mouth 2 (two) times daily.    aspirin (ECOTRIN) 81 MG EC tablet Take 1 tablet (81 mg total) by mouth once daily.    blood sugar diagnostic (FREESTYLE LITE STRIPS) Strp Use to check blood sugar; test 2 times a day, dispense 100 strips    busPIRone (BUSPAR) 10 MG tablet Take 10 mg by mouth 2 (two) times daily.    carBAMazepine (TEGRETOL) 200 mg tablet Take 200 mg by mouth every evening. Takes one and one-half tablet at night    clotrimazole-betamethasone 1-0.05% (LOTRISONE) cream YULISA TO THE AFFECTED EAR ATN FOR 1 MONTH    clotrimazole-betamethasone 1-0.05% (LOTRISONE) cream Apply to affected ear at night for 1 month    cyanocobalamin, vitamin B-12, (VITAMIN B-12) 2,500 mcg Subl Place 2,500 mcg under the tongue once daily.    ezetimibe (ZETIA) 10 mg tablet Take 1 tablet (10 mg total) by mouth once daily.    metoprolol tartrate (LOPRESSOR) 25 MG tablet Take 0.5 tablets (12.5 mg total) by mouth 2 (two) times  daily.    multivitamin Chew Take by mouth.    nateglinide (STARLIX) 60 MG tablet Take 60 mg by mouth.    SITagliptin (JANUVIA) 100 MG Tab Take 100 mg by mouth once daily.    timolol (BETIMOL) 0.5 % ophthalmic solution 1 drop 2 (two) times daily.    timolol maleate 0.5% (TIMOPTIC) 0.5 % Drop INSTILL 1 DROP IN BOTH EYES TWICE DAILY    turmeric, bulk, 95 % Powd by Misc.(Non-Drug; Combo Route) route.    valsartan-hydrochlorothiazide (DIOVAN-HCT) 80-12.5 mg per tablet Take 1 tablet by mouth once daily.     No current facility-administered medications on file prior to visit.      Review of patient's allergies indicates:   Allergen Reactions    Lipitor [atorvastatin]      Review of Systems   Constitution: Negative for diaphoresis, malaise/fatigue and weight gain.   HENT: Negative for hoarse voice.    Eyes: Negative for double vision and visual disturbance.   Cardiovascular: Negative for chest pain, claudication, cyanosis, dyspnea on exertion, irregular heartbeat, leg swelling, near-syncope, orthopnea, palpitations, paroxysmal nocturnal dyspnea and syncope.   Respiratory: Negative for cough, hemoptysis, shortness of breath and snoring.    Hematologic/Lymphatic: Negative for bleeding problem. Does not bruise/bleed easily.   Skin: Negative for color change and poor wound healing.   Musculoskeletal: Negative for muscle cramps, muscle weakness and myalgias.   Gastrointestinal: Negative for bloating, abdominal pain, change in bowel habit, diarrhea, heartburn, hematemesis, hematochezia, melena and nausea.   Neurological: Negative for excessive daytime sleepiness, dizziness, headaches, light-headedness, loss of balance, numbness and weakness.   Psychiatric/Behavioral: Negative for memory loss. The patient does not have insomnia.    Allergic/Immunologic: Negative for hives.       Objective:   Physical Exam   Constitutional: She is oriented to person, place, and time. She appears well-developed and well-nourished. She does  "not appear ill. No distress.   HENT:   Head: Normocephalic and atraumatic.   Eyes: Pupils are equal, round, and reactive to light. EOM are normal. No scleral icterus.   Neck: Normal range of motion. Neck supple. Normal carotid pulses, no hepatojugular reflux and no JVD present. Carotid bruit is not present. No tracheal deviation present. No thyromegaly present.   Cardiovascular: Normal rate, regular rhythm, normal heart sounds and normal pulses. Exam reveals no gallop and no friction rub.   No murmur heard.  Pulmonary/Chest: Effort normal and breath sounds normal. No respiratory distress. She has no wheezes. She has no rhonchi. She has no rales. She exhibits no tenderness.   Abdominal: Soft. Normal appearance, normal aorta and bowel sounds are normal. She exhibits no abdominal bruit, no ascites and no pulsatile midline mass. There is no hepatomegaly. There is no tenderness.   Musculoskeletal: She exhibits no edema.        Right shoulder: She exhibits no deformity.   Neurological: She is alert and oriented to person, place, and time. She has normal strength. No cranial nerve deficit. Coordination normal.   Skin: Skin is warm and dry. No rash noted. She is not diaphoretic. No cyanosis or erythema. Nails show no clubbing.   Psychiatric: She has a normal mood and affect. Her speech is normal and behavior is normal.   Nursing note and vitals reviewed.    Vitals:    04/11/19 1421   BP: 98/68   BP Location: Left arm   Patient Position: Standing   BP Method: Large (Manual)   Pulse: 64   Weight: 110.6 kg (243 lb 11.5 oz)   Height: 5' 4" (1.626 m)     Lab Results   Component Value Date    CHOL 175 04/04/2019    CHOL 173 12/13/2018     Lab Results   Component Value Date    HDL 48 04/04/2019    HDL 42 12/13/2018     Lab Results   Component Value Date    LDLCALC 101.4 04/04/2019    LDLCALC 100.4 12/13/2018     Lab Results   Component Value Date    TRIG 128 04/04/2019    TRIG 153 (H) 12/13/2018     Lab Results   Component Value " Date    CHOLHDL 27.4 04/04/2019    CHOLHDL 24.3 12/13/2018       Chemistry        Component Value Date/Time     04/04/2019 0805    K 4.1 04/04/2019 0805     04/04/2019 0805    CO2 25 04/04/2019 0805    BUN 14 04/04/2019 0805    CREATININE 0.9 04/04/2019 0805     (H) 04/04/2019 0805        Component Value Date/Time    CALCIUM 9.5 04/04/2019 0805    ALKPHOS 87 04/04/2019 0805    AST 15 04/04/2019 0805    ALT 14 04/04/2019 0805    BILITOT 0.3 04/04/2019 0805    ESTGFRAFRICA >60 04/04/2019 0805    EGFRNONAA >60 04/04/2019 0805          No results found for: TSH  Lab Results   Component Value Date    INR 1.0 12/13/2018    INR 1.0 08/17/2016     Lab Results   Component Value Date    WBC 10.71 12/14/2018    HGB 11.9 (L) 12/14/2018    HCT 37.1 12/14/2018    MCV 78 (L) 12/14/2018     12/14/2018     BMP  Sodium   Date Value Ref Range Status   04/04/2019 142 136 - 145 mmol/L Final     Potassium   Date Value Ref Range Status   04/04/2019 4.1 3.5 - 5.1 mmol/L Final     Chloride   Date Value Ref Range Status   04/04/2019 106 95 - 110 mmol/L Final     CO2   Date Value Ref Range Status   04/04/2019 25 23 - 29 mmol/L Final     BUN, Bld   Date Value Ref Range Status   04/04/2019 14 8 - 23 mg/dL Final     Creatinine   Date Value Ref Range Status   04/04/2019 0.9 0.5 - 1.4 mg/dL Final     Calcium   Date Value Ref Range Status   04/04/2019 9.5 8.7 - 10.5 mg/dL Final     Anion Gap   Date Value Ref Range Status   04/04/2019 11 8 - 16 mmol/L Final     eGFR if    Date Value Ref Range Status   04/04/2019 >60 >60 mL/min/1.73 m^2 Final     eGFR if non    Date Value Ref Range Status   04/04/2019 >60 >60 mL/min/1.73 m^2 Final     Comment:     Calculation used to obtain the estimated glomerular filtration  rate (eGFR) is the CKD-EPI equation.        CrCl cannot be calculated (Patient's most recent lab result is older than the maximum 7 days allowed.).    Assessment:     1. Cardiomyopathy,  dilated    2. Abnormal echocardiogram    3. Elevated troponin    4. Essential hypertension    5. PAF (paroxysmal atrial fibrillation)    6. Mild concentric left ventricular hypertrophy (LVH)    7. Mixed hyperlipidemia    8. Type 2 diabetes mellitus without complication, without long-term current use of insulin    9. Obesity hypoventilation syndrome    10. Statin intolerance      Stable clinically no chf lvf recovered completely no arrythmias clinically lipids borderline.will reopeat lipids a1c in 3 months with holetr in meantime will get sleep eval.  Plan:   Continue current therapy  Cardiac low salt diet.  Risk factor modification and excercise program./weight loss.  F/u in 3 months with lipid cmp a1c.

## 2019-04-17 ENCOUNTER — TELEPHONE (OUTPATIENT)
Dept: PULMONOLOGY | Facility: CLINIC | Age: 64
End: 2019-04-17

## 2019-07-03 ENCOUNTER — TELEPHONE (OUTPATIENT)
Dept: CARDIOLOGY | Facility: CLINIC | Age: 64
End: 2019-07-03

## 2019-07-03 NOTE — TELEPHONE ENCOUNTER
----- Message from Xiomara Roth sent at 7/3/2019  2:22 PM CDT -----  Contact: pt  .Type:  Sooner Apoointment Request    Caller is requesting a sooner appointment.  Caller declined first available appointment listed below.  Caller will not accept being placed on the waitlist and is requesting a message be sent to doctor.  Name of Caller: pt  When is the first available appointment? 8/14  Symptoms: surgical clearance   Would the patient rather a call back or a response via [facility]? Call back   Best Call Back Number: [phone] (home)     Additional Information: pr needs an appointment before 7/25

## 2019-07-12 ENCOUNTER — TELEPHONE (OUTPATIENT)
Dept: CARDIOLOGY | Facility: CLINIC | Age: 64
End: 2019-07-12

## 2019-07-12 NOTE — TELEPHONE ENCOUNTER
----- Message from Óscar Tamez sent at 7/12/2019 11:07 AM CDT -----  Contact: Pt     Type:  Sooner Apoointment Request    Caller is requesting a sooner appointment.  Caller declined first available appointment listed below.  Caller will not accept being placed on the waitlist and is requesting a message be sent to doctor.  Name of Caller:pt   When is the first available appointment?08/14/2019  Symptoms: pre op   Would the patient rather a call back or a response via [facility]? Either   Best Call Back Number: [phone]  Additional Information: States she's having surgery on 07/25 and needs to see Dr for a preop & form to be filled out and would like a call back today  -

## 2019-07-15 ENCOUNTER — TELEPHONE (OUTPATIENT)
Dept: PULMONOLOGY | Facility: CLINIC | Age: 64
End: 2019-07-15

## 2019-07-15 NOTE — TELEPHONE ENCOUNTER
----- Message from Aleshia Dinero sent at 7/15/2019  9:56 AM CDT -----  Contact: Pt  Type:  Sooner Apoointment Request    Caller is requesting a sooner appointment.  Caller declined first available appointment listed below.  Caller will not accept being placed on the waitlist and is requesting a message be sent to doctor.  Name of Caller: Keysha (pt)  When is the first available appointment? Not showing anything for the remainder of the year  Symptoms: Consult  Would the patient rather a call back or a response via [facility]? Callback  Best Call Back Number: [phone]  Additional Information: No

## 2019-07-17 ENCOUNTER — CLINICAL SUPPORT (OUTPATIENT)
Dept: CARDIOLOGY | Facility: CLINIC | Age: 64
End: 2019-07-17
Payer: COMMERCIAL

## 2019-07-17 ENCOUNTER — OFFICE VISIT (OUTPATIENT)
Dept: PULMONOLOGY | Facility: CLINIC | Age: 64
End: 2019-07-17
Payer: COMMERCIAL

## 2019-07-17 ENCOUNTER — OFFICE VISIT (OUTPATIENT)
Dept: CARDIOLOGY | Facility: CLINIC | Age: 64
End: 2019-07-17
Payer: COMMERCIAL

## 2019-07-17 VITALS
HEART RATE: 69 BPM | WEIGHT: 238.56 LBS | SYSTOLIC BLOOD PRESSURE: 110 MMHG | HEIGHT: 64 IN | DIASTOLIC BLOOD PRESSURE: 62 MMHG | OXYGEN SATURATION: 96 % | BODY MASS INDEX: 40.73 KG/M2

## 2019-07-17 VITALS
HEIGHT: 65 IN | RESPIRATION RATE: 18 BRPM | WEIGHT: 241.5 LBS | DIASTOLIC BLOOD PRESSURE: 86 MMHG | HEART RATE: 62 BPM | SYSTOLIC BLOOD PRESSURE: 130 MMHG | BODY MASS INDEX: 40.24 KG/M2 | OXYGEN SATURATION: 99 %

## 2019-07-17 DIAGNOSIS — I10 ESSENTIAL HYPERTENSION: ICD-10-CM

## 2019-07-17 DIAGNOSIS — I42.0 CARDIOMYOPATHY, DILATED: ICD-10-CM

## 2019-07-17 DIAGNOSIS — Z01.810 PREOP CARDIOVASCULAR EXAM: Primary | ICD-10-CM

## 2019-07-17 DIAGNOSIS — I48.0 PAF (PAROXYSMAL ATRIAL FIBRILLATION): ICD-10-CM

## 2019-07-17 DIAGNOSIS — R40.0 DAYTIME SLEEPINESS: ICD-10-CM

## 2019-07-17 DIAGNOSIS — Z01.810 PREOP CARDIOVASCULAR EXAM: ICD-10-CM

## 2019-07-17 DIAGNOSIS — E66.2 OBESITY HYPOVENTILATION SYNDROME: Chronic | ICD-10-CM

## 2019-07-17 DIAGNOSIS — R06.83 SNORING: ICD-10-CM

## 2019-07-17 DIAGNOSIS — G47.30 SLEEP-DISORDERED BREATHING: Primary | ICD-10-CM

## 2019-07-17 DIAGNOSIS — E11.9 TYPE 2 DIABETES MELLITUS WITHOUT COMPLICATION, WITHOUT LONG-TERM CURRENT USE OF INSULIN: ICD-10-CM

## 2019-07-17 DIAGNOSIS — I51.7 MILD CONCENTRIC LEFT VENTRICULAR HYPERTROPHY (LVH): ICD-10-CM

## 2019-07-17 DIAGNOSIS — F31.9 BIPOLAR AFFECTIVE DISORDER, REMISSION STATUS UNSPECIFIED: Chronic | ICD-10-CM

## 2019-07-17 DIAGNOSIS — E66.01 MORBID OBESITY WITH BMI OF 40.0-44.9, ADULT: ICD-10-CM

## 2019-07-17 PROCEDURE — 99214 PR OFFICE/OUTPT VISIT, EST, LEVL IV, 30-39 MIN: ICD-10-PCS | Mod: S$GLB,,, | Performed by: NURSE PRACTITIONER

## 2019-07-17 PROCEDURE — 3008F PR BODY MASS INDEX (BMI) DOCUMENTED: ICD-10-PCS | Mod: CPTII,S$GLB,, | Performed by: NURSE PRACTITIONER

## 2019-07-17 PROCEDURE — 3074F PR MOST RECENT SYSTOLIC BLOOD PRESSURE < 130 MM HG: ICD-10-PCS | Mod: CPTII,S$GLB,, | Performed by: NURSE PRACTITIONER

## 2019-07-17 PROCEDURE — 3079F PR MOST RECENT DIASTOLIC BLOOD PRESSURE 80-89 MM HG: ICD-10-PCS | Mod: CPTII,S$GLB,, | Performed by: NURSE PRACTITIONER

## 2019-07-17 PROCEDURE — 3074F SYST BP LT 130 MM HG: CPT | Mod: CPTII,S$GLB,, | Performed by: NURSE PRACTITIONER

## 2019-07-17 PROCEDURE — 3045F PR MOST RECENT HEMOGLOBIN A1C LEVEL 7.0-9.0%: ICD-10-PCS | Mod: CPTII,S$GLB,, | Performed by: NURSE PRACTITIONER

## 2019-07-17 PROCEDURE — 99999 PR PBB SHADOW E&M-EST. PATIENT-LVL III: ICD-10-PCS | Mod: PBBFAC,,, | Performed by: NURSE PRACTITIONER

## 2019-07-17 PROCEDURE — 99204 OFFICE O/P NEW MOD 45 MIN: CPT | Mod: S$GLB,,, | Performed by: NURSE PRACTITIONER

## 2019-07-17 PROCEDURE — 3075F SYST BP GE 130 - 139MM HG: CPT | Mod: CPTII,S$GLB,, | Performed by: NURSE PRACTITIONER

## 2019-07-17 PROCEDURE — 93010 EKG 12-LEAD: ICD-10-PCS | Mod: S$GLB,,, | Performed by: INTERNAL MEDICINE

## 2019-07-17 PROCEDURE — 99999 PR PBB SHADOW E&M-EST. PATIENT-LVL IV: ICD-10-PCS | Mod: PBBFAC,,, | Performed by: NURSE PRACTITIONER

## 2019-07-17 PROCEDURE — 3045F PR MOST RECENT HEMOGLOBIN A1C LEVEL 7.0-9.0%: CPT | Mod: CPTII,S$GLB,, | Performed by: NURSE PRACTITIONER

## 2019-07-17 PROCEDURE — 3078F DIAST BP <80 MM HG: CPT | Mod: CPTII,S$GLB,, | Performed by: NURSE PRACTITIONER

## 2019-07-17 PROCEDURE — 3078F PR MOST RECENT DIASTOLIC BLOOD PRESSURE < 80 MM HG: ICD-10-PCS | Mod: CPTII,S$GLB,, | Performed by: NURSE PRACTITIONER

## 2019-07-17 PROCEDURE — 3008F BODY MASS INDEX DOCD: CPT | Mod: CPTII,S$GLB,, | Performed by: NURSE PRACTITIONER

## 2019-07-17 PROCEDURE — 99999 PR PBB SHADOW E&M-EST. PATIENT-LVL IV: CPT | Mod: PBBFAC,,, | Performed by: NURSE PRACTITIONER

## 2019-07-17 PROCEDURE — 99214 OFFICE O/P EST MOD 30 MIN: CPT | Mod: S$GLB,,, | Performed by: NURSE PRACTITIONER

## 2019-07-17 PROCEDURE — 99999 PR PBB SHADOW E&M-EST. PATIENT-LVL III: CPT | Mod: PBBFAC,,, | Performed by: NURSE PRACTITIONER

## 2019-07-17 PROCEDURE — 3075F PR MOST RECENT SYSTOLIC BLOOD PRESS GE 130-139MM HG: ICD-10-PCS | Mod: CPTII,S$GLB,, | Performed by: NURSE PRACTITIONER

## 2019-07-17 PROCEDURE — 3079F DIAST BP 80-89 MM HG: CPT | Mod: CPTII,S$GLB,, | Performed by: NURSE PRACTITIONER

## 2019-07-17 PROCEDURE — 99204 PR OFFICE/OUTPT VISIT, NEW, LEVL IV, 45-59 MIN: ICD-10-PCS | Mod: S$GLB,,, | Performed by: NURSE PRACTITIONER

## 2019-07-17 PROCEDURE — 93010 ELECTROCARDIOGRAM REPORT: CPT | Mod: S$GLB,,, | Performed by: INTERNAL MEDICINE

## 2019-07-17 NOTE — PATIENT INSTRUCTIONS
What Are Snoring and Obstructive Sleep Apnea?  If youve ever had a stuffed-up nose, you know the feeling of trying to breathe through a very narrow passageway. This is what happens in your throat when you snore. While you sleep, structures in your throat partially block your air passage, making the passage narrow and hard to breathe through. If the entire passage becomes blocked and you cant breathe at all, you have sleep apnea.      Snoring Obstructive sleep apnea   Snoring  If your throat structures are too large or the muscles relax too much during sleep, the air passage may be partially blocked. As air from the nose or mouth passes around this blockage, the throat structures vibrate, causing the familiar sound of snoring. At times, this sound can be so loud that snorers wake up others, or even themselves, during the night. Snoring gets worse as more and more of the air passage is blocked.  Obstructive sleep apnea  If the structures completely block the throat, air cant flow to the lungs at all. This is called apnea (meaning no breathing). Since the lungs arent getting fresh air, the brain tells the body to wake up just enough to tighten the muscles and unblock the air passage. With a loud gasp, breathing begins again. This process may be repeated over and over again throughout the night, making your sleep fragmented with a lighter stage of sleep. Even though you do not remember waking up many times during the night to a lighter sleep, you feel tired the next day. The lack of sleep and fresh air can also strain your lungs, heart, and other organs, leading to problems such as high blood pressure, heart attack, or stroke.  Problems in the nose and jaw  Problems in the structure of the nose may obstruct breathing. A crooked (deviated) septum or swollen turbinates can make snoring worse or lead to apnea. Also, a receding jaw may make the tongue sit too far back, so its more likely to block the airway when  youre asleep.        Date Last Reviewed: 7/18/2015  © 3270-3815 [employer]. [address] [place] [address]. All rights reserved. This information is not intended as a substitute for professional medical care. Always follow your healthcare professional's instructions.        Continuous Positive Air Pressure (CPAP)     A mask over the nose gently directs air into the throat to keep the airway open.   Continuous positive air pressure (CPAP) uses gentle air pressure to hold the airway open. CPAP is often the most effective treatment for sleep apnea and severe snoring. It works very well for many people. But keep in mind that it can take several adjustments before the setup is right for you.  How CPAP works  The CPAP machine  is a small portable pump beside the bed. The pump sends air through a hose, which is held over your nose and mouth by a mask. Mild air pressure is gently pushed through your airway. The air pressure nudges sagging tissues aside. This widens the airway so you can breathe better. CPAP may be combined with other kinds of therapy for sleep apnea.  Types of air pressure treatments  There are different types of CPAP. Your doctor or CPAP technician will help you decide which type is best for you:  · Basic CPAP keeps the pressure constant all night long.  · A bilevel device (BiPAP) provides more pressure when you breathe in and less when you breathe out. A BiPAP machine also may be set to provide automatic breaths to maintain breathing if you stop breathing while sleeping.  · An autoCPAP device automatically adjusts pressure throughout the night and in response to changes such as body position, sleep stage, and snoring.  Date Last Reviewed: 8/10/2015  © 5590-3351 [employer]. [address] [place], [place] [address]. All rights reserved. This information is not intended as a substitute for professional medical care. Always follow your healthcare professional's  instructions.

## 2019-07-17 NOTE — PROGRESS NOTES
"    Subjective:      Patient ID: Keysha Dewey is a 63 y.o. female.    Patient Active Problem List   Diagnosis    Elevated troponin    Obesity hypoventilation syndrome    Type 2 diabetes mellitus, without long-term current use of insulin    Bipolar disorder    Cardiomyopathy, dilated    Abnormal echocardiogram    PAF (paroxysmal atrial fibrillation)    Essential hypertension    Mixed hyperlipidemia    Statin intolerance    Mild concentric left ventricular hypertrophy (LVH)    Preop cardiovascular exam     she has been referred by Jessica Faith MD for evaluation and management for   Chief Complaint   Patient presents with    Sleep Apnea     Chief Complaint: Sleep Apnea    HPI:  She presents for a sleep evaluation referred by her cardiologist with history of a fib and cardimyopathy.   Patient has observed snoring, feels sleepy during the day.  Patient reports restful sleep most of time.  She denies morning headache.    She reports day time napping about 4 pm; duration 1.5 - 2 Hours  She denies recent weight gain.  Cardiovascular risk factors: diabetes, hypertension, hyperlipidemia, obesity, cardiomyopathy, atrial fib.   Bed time is 0930 - 1100  Wake time is 0800 - 0900  Sleep onset is within 15 Minutes. Watches TV to go to sleep. She has Tinnitis, TV helps drowned out the ringing in the ears.   Sleep maintenance difficulties related to up to bathroom one to two times a night  Wake after sleep onset occurs two times a night.  Nocturia occurs two times a night,   Sleep aids :  NO, since Buspar 10 mg and Tegretol 200 mg for anxiety in 2017 no trouble falling asleep.  Anxiety and "thinking" in past would keep her from falling asleep.  Dry mouth : YES  Sleep walking:  NO  Sleep talking :  NO  Sleep eating: NO  Vivid Dreams :  NO  Cataplexy :  NO    [place] sleepiness score was 8.  Neck circumference is 38 cm. (15 inches).  Mallampati score 4    STOP - BANG Questionnaire:   1. Snoring : Do you snore loudly ? "     YES  2. Tired : Do you often feel tired, fatigued, or sleepy during daytime?   YES  3. Observed: Has anyone observed you stop breathing during your sleep?    NO  4. Blood pressure : Do you have or are you being treated for high blood pressure?    YES  5. BMI :BMI more than 35 kg/m2?    YES  6. Age : Age over 50 yr old?    YES  7. Neck circumference: Neck circumference greater than 40 cm?   NO  8. Gender: Gender male?   NO    SCORE: 5    High risk of IDNIRA: Yes 5 - 8  Intermediate risk of INDIRA: Yes 3 - 4  Low risk of INDIRA: Yes 0 - 2    Occupational History:  Retired 2010 from [employer].    Previous Report Reviewed: lab reports and office notes     Past Medical History: The following portions of the patient's history were reviewed and updated as appropriate:   She  has a past surgical history that includes Gastric bypass (2006); Hysterectomy (2002); Cardiac catheterization; and Left heart catheterization (Left, 12/14/2018).  Her family history is not on file.  She  reports that she has never smoked. She does not have any smokeless tobacco history on file. She reports that she does not drink alcohol or use drugs.  She has a current medication list which includes the following prescription(s): amlodipine, apixaban, aspirin, buspirone, carbamazepine, cyanocobalamin (vitamin b-12), exenatide, ezetimibe, metoprolol tartrate, multivitamin, nateglinide, sitagliptin, timolol, turmeric (bulk), and valsartan-hydrochlorothiazide.  She is allergic to lipitor [atorvastatin]..    Review of Systems   Constitutional: Negative for fever, chills, weight loss, weight gain, activity change, appetite change, fatigue and night sweats.   HENT: Negative for postnasal drip, rhinorrhea, sinus pressure, voice change and congestion.    Eyes: Negative for redness and itching.   Respiratory: Positive for snoring. Negative for cough, sputum production, chest tightness, shortness of breath, wheezing, orthopnea, asthma nighttime  "symptoms, dyspnea on extertion, use of rescue inhaler and somnolence.    Cardiovascular: Negative.  Negative for chest pain, palpitations and leg swelling.   Genitourinary: Negative for difficulty urinating and hematuria.   Endocrine: Negative for cold intolerance and heat intolerance.    Musculoskeletal: Negative for arthralgias, gait problem, joint swelling and myalgias.   Skin: Negative.    Gastrointestinal: Negative for nausea, vomiting, abdominal pain and acid reflux.   Neurological: Negative for dizziness, weakness, light-headedness and headaches.   Hematological: Negative for adenopathy. No excessive bruising.   All other systems reviewed and are negative.     Objective:   /86   Pulse 62   Resp 18   Ht 5' 4.5" (1.638 m)   Wt 109.5 kg (241 lb 8.2 oz)   SpO2 99%   BMI 40.82 kg/m²   Physical Exam   Constitutional: She is oriented to person, place, and time. She appears well-developed and well-nourished. She is active and cooperative.  Non-toxic appearance. She does not appear ill. No distress.   HENT:   Head: Normocephalic.   Right Ear: External ear normal.   Left Ear: External ear normal.   Nose: Nose normal.   Mouth/Throat: Oropharynx is clear and moist. No oropharyngeal exudate.   Eyes: Conjunctivae are normal.   Neck: Normal range of motion. Neck supple.   Cardiovascular: Normal rate, regular rhythm, normal heart sounds and intact distal pulses.   Pulmonary/Chest: Effort normal and breath sounds normal. No stridor.   Abdominal: Soft.   Musculoskeletal: Normal range of motion.   Lymphadenopathy:     She has no cervical adenopathy.   Neurological: She is alert and oriented to person, place, and time.   Skin: Skin is warm and dry.   Psychiatric: She has a normal mood and affect. Her behavior is normal. Judgment and thought content normal.   Vitals reviewed.    Personal Diagnostic Review  Review of labs, xray's, cardiology reports.     Assessment:     1. Sleep-disordered breathing    2. Snoring    3. " Daytime sleepiness    4. Obesity hypoventilation syndrome    5. Morbid obesity with BMI of 40.0-44.9, adult    6. PAF (paroxysmal atrial fibrillation)    7. Mild concentric left ventricular hypertrophy (LVH)    8. Essential hypertension    9. Type 2 diabetes mellitus without complication, without long-term current use of insulin    10. Cardiomyopathy, dilated    11. Bipolar affective disorder, remission status unspecified      Orders Placed This Encounter   Procedures    Polysomnogram (CPAP will be added if patient meets diagnostic criteria.)     Standing Status:   Future     Standing Expiration Date:   7/17/2020     Plan:   Discussed diagnosis, its evaluation, treatment and usual course. All questions answered.  Problem List Items Addressed This Visit     Type 2 diabetes mellitus, without long-term current use of insulin    Relevant Orders    Polysomnogram (CPAP will be added if patient meets diagnostic criteria.)    PAF (paroxysmal atrial fibrillation)    Relevant Orders    Polysomnogram (CPAP will be added if patient meets diagnostic criteria.)    Obesity hypoventilation syndrome (Chronic)    Relevant Orders    Polysomnogram (CPAP will be added if patient meets diagnostic criteria.)    Mild concentric left ventricular hypertrophy (LVH)    Relevant Orders    Polysomnogram (CPAP will be added if patient meets diagnostic criteria.)    Essential hypertension    Relevant Orders    Polysomnogram (CPAP will be added if patient meets diagnostic criteria.)    Cardiomyopathy, dilated    Relevant Orders    Polysomnogram (CPAP will be added if patient meets diagnostic criteria.)    Bipolar disorder (Chronic)    Relevant Orders    Polysomnogram (CPAP will be added if patient meets diagnostic criteria.)      Other Visit Diagnoses     Sleep-disordered breathing    -  Primary    Relevant Orders    Polysomnogram (CPAP will be added if patient meets diagnostic criteria.)    Snoring        Relevant Orders    Polysomnogram (CPAP  will be added if patient meets diagnostic criteria.)    Daytime sleepiness        Relevant Orders    Polysomnogram (CPAP will be added if patient meets diagnostic criteria.)    Morbid obesity with BMI of 40.0-44.9, adult        Relevant Orders    Polysomnogram (CPAP will be added if patient meets diagnostic criteria.)      Plan summary:  High risk for sleep apnea with comorbidity and atrial fibrillation proceed with sleep study evaluate for INDIRA.     Follow up for Sleep Study results review.    Thank you for the opportunity to participate in the care of this patient.

## 2019-07-17 NOTE — PROGRESS NOTES
Subjective:   Patient ID:  Keysha Dewey is a 63 y.o. female who presents for follow up of Pre-op Exam ([facility] 7/25/19 Dr. Leavitt- SHERITA Knee replacement)      HPI  Ms. Dewey presents to clinic today seeking cardiac risk stratification for right knee replacement scheduled for 7/25/19 with Dr. Leavitt. Her current medical conditions include PAF, bipolar disorder, DM, HTN, HLP,  statin intolerance  and dilated cardiomyopathy.  She has a history of gastric bypass 2005 and hysterectomy in 2002. Had no complications with anesthesia.     Echo in revealed LVEF 35%. Underwent LHC in December 2018 to evaluate for CAD as cause of LV decline. LHC revealed normal coronary arteries. Started on medical therapy and repeat echo in January 2019 revealed recovered LVEF to 60%.     Denies any chest pain, SOB, COHN, orthopnea, PND, dizziness, palpitations,  near syncope, syncope or edema . Has no symptoms concerning for angina or equivalent. No CNS Complaints to suggest TIA or CVA. Does well with limiting sodium intake.  EKG today shows NSR with no ischemic changes.   Has no abnormal bleeding on Eliquis.   Uses a stationary bike at the gym 3 days a week.   Scheduled to see Pulmonology today for sleep evaluation.     Past Medical History:   Diagnosis Date    Atrial fibrillation     Bipolar disorder     Cardiomyopathy, dilated 12/14/2018    DM w/o complication type II     Essential hypertension 1/7/2019    Mixed hyperlipidemia 1/7/2019    Obesity hypoventilation syndrome 12/12/2018    Statin intolerance 1/7/2019       Past Surgical History:   Procedure Laterality Date    CARDIAC CATHETERIZATION      CATHETERIZATION, HEART, LEFT Left 12/14/2018    [facility]    GASTRIC BYPASS  2006    HYSTERECTOMY  2002       Social History     Tobacco Use    Smoking status: Never Smoker   Substance Use Topics    Alcohol use: No    Drug use: No       History reviewed. No pertinent family  history.    Current Outpatient Medications   Medication Sig    amLODIPine (NORVASC) 2.5 MG tablet Take 1 tablet (2.5 mg total) by mouth once daily.    apixaban (ELIQUIS) 5 mg Tab Take 1 tablet (5 mg total) by mouth 2 (two) times daily.    aspirin (ECOTRIN) 81 MG EC tablet Take 1 tablet (81 mg total) by mouth once daily.    busPIRone (BUSPAR) 10 MG tablet Take 10 mg by mouth 2 (two) times daily.    carBAMazepine (TEGRETOL) 200 mg tablet Take 200 mg by mouth every evening. Takes one and one-half tablet at night    cyanocobalamin, vitamin B-12, (VITAMIN B-12) 2,500 mcg Subl Place 2,500 mcg under the tongue once daily.    exenatide (BYETTA) 5 mcg/dose (250 mcg/mL) 1.2 mL injection Inject 5 mcg into the skin 2 (two) times daily with meals.    ezetimibe (ZETIA) 10 mg tablet Take 1 tablet (10 mg total) by mouth once daily.    metoprolol tartrate (LOPRESSOR) 25 MG tablet Take 0.5 tablets (12.5 mg total) by mouth 2 (two) times daily.    multivitamin Chew Take by mouth.    nateglinide (STARLIX) 60 MG tablet Take 60 mg by mouth.    SITagliptin (JANUVIA) 100 MG Tab Take 100 mg by mouth once daily.    timolol (BETIMOL) 0.5 % ophthalmic solution 1 drop 2 (two) times daily.    timolol maleate 0.5% (TIMOPTIC) 0.5 % Drop INSTILL 1 DROP IN BOTH EYES TWICE DAILY    turmeric, bulk, 95 % Powd by Misc.(Non-Drug; Combo Route) route.    valsartan-hydrochlorothiazide (DIOVAN-HCT) 80-12.5 mg per tablet Take 1 tablet by mouth once daily.     No current facility-administered medications for this visit.      Current Outpatient Medications on File Prior to Visit   Medication Sig    amLODIPine (NORVASC) 2.5 MG tablet Take 1 tablet (2.5 mg total) by mouth once daily.    apixaban (ELIQUIS) 5 mg Tab Take 1 tablet (5 mg total) by mouth 2 (two) times daily.    aspirin (ECOTRIN) 81 MG EC tablet Take 1 tablet (81 mg total) by mouth once daily.    busPIRone (BUSPAR) 10 MG tablet Take 10 mg by mouth 2 (two) times daily.     carBAMazepine (TEGRETOL) 200 mg tablet Take 200 mg by mouth every evening. Takes one and one-half tablet at night    cyanocobalamin, vitamin B-12, (VITAMIN B-12) 2,500 mcg Subl Place 2,500 mcg under the tongue once daily.    exenatide (BYETTA) 5 mcg/dose (250 mcg/mL) 1.2 mL injection Inject 5 mcg into the skin 2 (two) times daily with meals.    ezetimibe (ZETIA) 10 mg tablet Take 1 tablet (10 mg total) by mouth once daily.    metoprolol tartrate (LOPRESSOR) 25 MG tablet Take 0.5 tablets (12.5 mg total) by mouth 2 (two) times daily.    multivitamin Chew Take by mouth.    nateglinide (STARLIX) 60 MG tablet Take 60 mg by mouth.    SITagliptin (JANUVIA) 100 MG Tab Take 100 mg by mouth once daily.    timolol (BETIMOL) 0.5 % ophthalmic solution 1 drop 2 (two) times daily.    timolol maleate 0.5% (TIMOPTIC) 0.5 % Drop INSTILL 1 DROP IN BOTH EYES TWICE DAILY    turmeric, bulk, 95 % Powd by Misc.(Non-Drug; Combo Route) route.    valsartan-hydrochlorothiazide (DIOVAN-HCT) 80-12.5 mg per tablet Take 1 tablet by mouth once daily.    [DISCONTINUED] clotrimazole-betamethasone 1-0.05% (LOTRISONE) cream YULISA TO THE AFFECTED EAR ATN FOR 1 MONTH    [DISCONTINUED] clotrimazole-betamethasone 1-0.05% (LOTRISONE) cream Apply to affected ear at night for 1 month     No current facility-administered medications on file prior to visit.        Review of Systems   Constitution: Negative for diaphoresis, malaise/fatigue, weight gain and weight loss.   HENT: Negative for congestion and nosebleeds.    Cardiovascular: Negative for chest pain, claudication, cyanosis, dyspnea on exertion, irregular heartbeat, leg swelling, near-syncope, orthopnea, palpitations, paroxysmal nocturnal dyspnea and syncope.   Respiratory: Negative for cough, hemoptysis, shortness of breath, sleep disturbances due to breathing, snoring, sputum production and wheezing.    Hematologic/Lymphatic: Negative for bleeding problem. Does not bruise/bleed easily.  "  Skin: Negative for rash.   Musculoskeletal: Positive for joint pain ( right knee pain). Negative for arthritis, back pain, falls, muscle cramps and muscle weakness.   Gastrointestinal: Negative for abdominal pain, constipation, diarrhea, heartburn, hematemesis, hematochezia, melena, nausea and vomiting.   Genitourinary: Negative for dysuria, hematuria and nocturia.   Neurological: Negative for excessive daytime sleepiness, dizziness, headaches, light-headedness, loss of balance, numbness, vertigo and weakness.       Objective:   Physical Exam   Constitutional: She is oriented to person, place, and time. She appears well-developed and well-nourished.   Neck: Neck supple. No JVD present.   Cardiovascular: Normal rate, regular rhythm, normal heart sounds and normal pulses. Exam reveals no friction rub.   No murmur heard.  Pulmonary/Chest: Effort normal and breath sounds normal. No respiratory distress. She has no wheezes. She has no rales.   Abdominal: Soft. Bowel sounds are normal. She exhibits no distension.   Musculoskeletal: She exhibits no edema or tenderness.   Neurological: She is alert and oriented to person, place, and time.   Skin: Skin is warm and dry. No rash noted.   Psychiatric: She has a normal mood and affect. Her behavior is normal.   Nursing note and vitals reviewed.    Vitals:    07/17/19 0808   BP: 110/62   Pulse: 69   SpO2: 96%   Weight: 108.2 kg (238 lb 8.6 oz)   Height: 5' 4" (1.626 m)     Lab Results   Component Value Date    CHOL 175 04/04/2019    CHOL 173 12/13/2018     Lab Results   Component Value Date    HDL 48 04/04/2019    HDL 42 12/13/2018     Lab Results   Component Value Date    LDLCALC 101.4 04/04/2019    LDLCALC 100.4 12/13/2018     Lab Results   Component Value Date    TRIG 128 04/04/2019    TRIG 153 (H) 12/13/2018     Lab Results   Component Value Date    CHOLHDL 27.4 04/04/2019    CHOLHDL 24.3 12/13/2018       Chemistry        Component Value Date/Time     04/04/2019 0805 "    K 4.1 04/04/2019 0805     04/04/2019 0805    CO2 25 04/04/2019 0805    BUN 14 04/04/2019 0805    CREATININE 0.9 04/04/2019 0805     (H) 04/04/2019 0805        Component Value Date/Time    CALCIUM 9.5 04/04/2019 0805    ALKPHOS 87 04/04/2019 0805    AST 15 04/04/2019 0805    ALT 14 04/04/2019 0805    BILITOT 0.3 04/04/2019 0805    ESTGFRAFRICA >60 04/04/2019 0805    EGFRNONAA >60 04/04/2019 0805          No results found for: TSH  Lab Results   Component Value Date    INR 1.0 12/13/2018    INR 1.0 08/17/2016     Lab Results   Component Value Date    WBC 10.71 12/14/2018    HGB 11.9 (L) 12/14/2018    HCT 37.1 12/14/2018    MCV 78 (L) 12/14/2018     12/14/2018     BMP  Sodium   Date Value Ref Range Status   04/04/2019 142 136 - 145 mmol/L Final     Potassium   Date Value Ref Range Status   04/04/2019 4.1 3.5 - 5.1 mmol/L Final     Chloride   Date Value Ref Range Status   04/04/2019 106 95 - 110 mmol/L Final     CO2   Date Value Ref Range Status   04/04/2019 25 23 - 29 mmol/L Final     BUN, Bld   Date Value Ref Range Status   04/04/2019 14 8 - 23 mg/dL Final     Creatinine   Date Value Ref Range Status   04/04/2019 0.9 0.5 - 1.4 mg/dL Final     Calcium   Date Value Ref Range Status   04/04/2019 9.5 8.7 - 10.5 mg/dL Final     Anion Gap   Date Value Ref Range Status   04/04/2019 11 8 - 16 mmol/L Final     eGFR if    Date Value Ref Range Status   04/04/2019 >60 >60 mL/min/1.73 m^2 Final     eGFR if non    Date Value Ref Range Status   04/04/2019 >60 >60 mL/min/1.73 m^2 Final     Comment:     Calculation used to obtain the estimated glomerular filtration  rate (eGFR) is the CKD-EPI equation.        CrCl cannot be calculated (Patient's most recent lab result is older than the maximum 7 days allowed.).  Diagnostic:          Patient has a right dominant coronary artery.        The coronary vessels are all normal.        - Left Main Coronary Artery:             The LM is  normal. There is WILEY 3 flow.     - Left Anterior Descending Artery:             The LAD is normal. There is WILEY 3 flow.     - Left Circumflex Artery:             The LCX is normal. There is WILEY 3 flow.     - Right Coronary Artery:             The RCA is normal. There is WILEY 3 flow.      CONCLUSIONS     1 - Concentric hypertrophy.     2 - No wall motion abnormalities.     3 - Normal left ventricular systolic function (EF 60-65%).     4 - Normal left ventricular diastolic function.     5 - Normal right ventricular systolic function .     6 - The estimated PA systolic pressure is 20 mmHg.             This document has been electronically    SIGNED BY: Jovani Schmitt MD On: 01/03/2019 13:29      Assessment:     1. Preop cardiovascular exam    2. Cardiomyopathy, dilated    3. Essential hypertension    4. PAF (paroxysmal atrial fibrillation)        Plan:   Preop cardiovascular exam  Patient clear to proceed with right knee replacement surgery at low to moderate risk for CV event. Clear to hold Eliquis for 48 hours prior and will need to resume after     Cardiomyopathy, dilated  Recovered LVF to 60% on echo in January 2019  Continue Diovan, metoprolol   Heart healthy diet  Limit fluid intake 50-60 oz   Daily weights and to notify clinic if weight increases by more than 3 lbs in 1 day or 5 lbs in 1 week.   Exercise routine as tolerated    Essential hypertension  Continue amlodipine, Diovan-HCTZ and metoprolol     PAF (paroxysmal atrial fibrillation)  Continue Metoprolol and Eliquis     RTC in 6 months or sooner if needed

## 2019-07-17 NOTE — LETTER
July 17, 2019      Jessica Faith MD  [address]  [place] [address]           [facility]  [address] [place] [id number]  Phone: [phone]  Fax: [phone]          Patient: Keysha Dewey   MR Number: [id number]   YOB: 1955   Date of Visit: 7/17/2019       Dear Dr. Jessica Faith:    Thank you for referring Keysha Dewey to me for evaluation. Attached you will find relevant portions of my assessment and plan of care.    If you have questions, please do not hesitate to call me. I look forward to following Keysha Dewey along with you.    Sincerely,    Addie Brennan, NP    Enclosure  CC:  No Recipients    If you would like to receive this communication electronically, please contact [email or web address] or [phone] to request more information on [employer] access.    For providers and/or their staff who would like to refer a patient to [facility], please contact us through our one-stop-shop provider referral line, [facility] , at [phone].    If you feel you have received this communication in error or would no longer like to receive these types of communications, please e-mail [email or web address]

## 2019-07-18 ENCOUNTER — OFFICE VISIT (OUTPATIENT)
Dept: PAIN MEDICINE | Facility: CLINIC | Age: 64
End: 2019-07-18
Payer: COMMERCIAL

## 2019-07-18 VITALS
BODY MASS INDEX: 40.22 KG/M2 | HEART RATE: 70 BPM | HEIGHT: 65 IN | DIASTOLIC BLOOD PRESSURE: 73 MMHG | SYSTOLIC BLOOD PRESSURE: 116 MMHG | WEIGHT: 241.38 LBS

## 2019-07-18 DIAGNOSIS — M17.10 ARTHRITIS OF KNEE: ICD-10-CM

## 2019-07-18 DIAGNOSIS — G89.29 CHRONIC PAIN OF BOTH KNEES: Primary | ICD-10-CM

## 2019-07-18 DIAGNOSIS — M25.562 CHRONIC PAIN OF BOTH KNEES: Primary | ICD-10-CM

## 2019-07-18 DIAGNOSIS — M25.561 CHRONIC PAIN OF BOTH KNEES: Primary | ICD-10-CM

## 2019-07-18 PROCEDURE — 99999 PR PBB SHADOW E&M-EST. PATIENT-LVL III: ICD-10-PCS | Mod: PBBFAC,,, | Performed by: ANESTHESIOLOGY

## 2019-07-18 PROCEDURE — 99999 PR PBB SHADOW E&M-EST. PATIENT-LVL III: CPT | Mod: PBBFAC,,, | Performed by: ANESTHESIOLOGY

## 2019-07-18 PROCEDURE — 99204 OFFICE O/P NEW MOD 45 MIN: CPT | Mod: S$GLB,,, | Performed by: ANESTHESIOLOGY

## 2019-07-18 PROCEDURE — 99204 PR OFFICE/OUTPT VISIT, NEW, LEVL IV, 45-59 MIN: ICD-10-PCS | Mod: S$GLB,,, | Performed by: ANESTHESIOLOGY

## 2019-07-18 NOTE — PROGRESS NOTES
Chief Pain Complaint:  Knee Pain (Bilateral knee pain)        History of Present Illness:   Keysha Dewey is a 63 y.o. female  who is presenting with a chief complaint of Knee Pain (Bilateral knee pain)  . The patient began experiencing this problem insidiously, and the pain has been gradually worsening over the past 7 month(s). The pain is described as throbbing, cramping, aching and heavy and is located in the bilateral knees . Pain is intermittent and lasts hours. The  pain is nonradiating. The patient rates her pain a 7 out of ten and interferes with activities of daily living a 8 out of ten. Pain is exacerbated by ambulation, prolonged standing, and is improved by rest. Patient reports no prior trauma, no prior spinal surgery     - pertinent negatives: No fever, No chills, No weight loss, No bladder dysfunction, No bowel dysfunction, No saddle anesthesia  - pertinent positives: none    - medications, other therapies tried (physical therapy, injections):     >> NSAIDs and Tylenol    >> Has previously undergone Physical Therapy    >> Has NOT previously undergone spinal injection/s      Imaging / Labs / Studies (reviewed on 7/18/2019):      Review of Systems:  CONSTITUTIONAL: patient denies any fever, chills, or weight loss  SKIN: patient denies any rash or itching  RESPIRATORY: patient denies having any shortness of breath  GASTROINTESTINAL: patient denies having any diarrhea, constipation, or bowel incontinence  GENITOURINARY: patient denies having any abnormal bladder function    MUSCULOSKELETAL:  - patient complains of the above noted pain/s (see chief pain complaint)    NEUROLOGICAL:   - pain as above  - strength in Lower extremities is intact, BILATERALLY  - sensation in Lower extremities is intact, BILATERALLY  - patient denies any loss of bowel or bladder control      PSYCHIATRIC: patient denies any change in mood    Other:  All other systems reviewed and are negative      Physical Exam:  /73 (BP  "Location: Left arm, Patient Position: Sitting)   Pulse 70   Ht 5' 4.5" (1.638 m)   Wt 109.5 kg (241 lb 6.5 oz)   BMI 40.80 kg/m²  (reviewed on 7/18/2019)  General: Alert and oriented, in no apparent distress.  Gait: normal gait.  Skin: No rashes, No discoloration, No obvious lesions  HEENT: Normocephalic, atraumatic. Pupils equal and round.  Cardiovascular: Regular rate and rhythm , no significant peripheral edema present  Respiratory: Without audible wheezing, without use of accessory muscles of respiration.    Musculoskeletal:    Lumbar Spine    - Pain on flexion of lumbar spine Absent  - Straight Leg Raise:  Absent    - Pain on extension of lumbar spine Absent  - TTP over the lumbar facet joints Absent  - Lumbar facet loading Absent    -Pain on palpation over the SI joint  Absent  - JD: Absent    -TTP over bilateral knee joint  - No effusion   - ROM preserved       Neuro:    Strength:  LE R/L: HF: 5/5, HE: 5/5, KF: 5/5; KE: 5/5; FE: 5/5; FF: 5/5    Extremity Reflexes: Brisk and symmetric throughout.      Extremity Sensory: Sensation to pinprick and temperature symmetric. Proprioception intact.      Psych:  Mood and affect is appropriate      Assessment:    Keysha Dewey is a 63 y.o. year old female who is presenting with .    Encounter Diagnoses   Name Primary?    Chronic pain of both knees Yes    Arthritis of knee        Plan:    1. Interventional: Patient will call to schedule bilateral Genicular Nerve block with RN IV sedation.      2. Pharmacologic: Tylenol, NSAID's PRN.    3. Rehabilitative: Encouraged PT.     4. Diagnostic: Patient will retreive knee xray's.    5. Follow up: PRN.    20  minutes were spent in this encounter with more than 50% of the time used for counseling and review of the plan.  Imaging / studies reviewed, detailed above.  I discussed in detail the risks, benefits, and alternatives to any and all potential treatment options.  All questions and concerns were fully addressed today " in clinic. Medical decision making moderate.    Thank you for the opportunity to assist in the care of this patient.    Best wishes,    Signed:    Claudio Maloney MD          Disclaimer:  This note may have been prepared using voice recognition software, it may have not been extensively proofed, as such there could be errors within the text such as sound alike errors.

## 2019-07-25 ENCOUNTER — LAB VISIT (OUTPATIENT)
Dept: LAB | Facility: HOSPITAL | Age: 64
End: 2019-07-25
Attending: INTERNAL MEDICINE
Payer: COMMERCIAL

## 2019-07-25 ENCOUNTER — TELEPHONE (OUTPATIENT)
Dept: PULMONOLOGY | Facility: CLINIC | Age: 64
End: 2019-07-25

## 2019-07-25 ENCOUNTER — PATIENT MESSAGE (OUTPATIENT)
Dept: PULMONOLOGY | Facility: CLINIC | Age: 64
End: 2019-07-25

## 2019-07-25 ENCOUNTER — TELEPHONE (OUTPATIENT)
Dept: CARDIOLOGY | Facility: CLINIC | Age: 64
End: 2019-07-25

## 2019-07-25 DIAGNOSIS — I51.7 MILD CONCENTRIC LEFT VENTRICULAR HYPERTROPHY (LVH): ICD-10-CM

## 2019-07-25 DIAGNOSIS — R06.83 SNORING: ICD-10-CM

## 2019-07-25 DIAGNOSIS — I10 ESSENTIAL HYPERTENSION: ICD-10-CM

## 2019-07-25 DIAGNOSIS — E66.01 MORBID OBESITY WITH BMI OF 40.0-44.9, ADULT: ICD-10-CM

## 2019-07-25 DIAGNOSIS — F31.9 BIPOLAR AFFECTIVE DISORDER, REMISSION STATUS UNSPECIFIED: ICD-10-CM

## 2019-07-25 DIAGNOSIS — G47.30 SLEEP-DISORDERED BREATHING: Primary | ICD-10-CM

## 2019-07-25 DIAGNOSIS — E11.9 TYPE 2 DIABETES MELLITUS WITHOUT COMPLICATION, WITHOUT LONG-TERM CURRENT USE OF INSULIN: ICD-10-CM

## 2019-07-25 DIAGNOSIS — R40.0 DAYTIME SLEEPINESS: ICD-10-CM

## 2019-07-25 DIAGNOSIS — E78.2 MIXED HYPERLIPIDEMIA: ICD-10-CM

## 2019-07-25 DIAGNOSIS — I48.0 PAF (PAROXYSMAL ATRIAL FIBRILLATION): ICD-10-CM

## 2019-07-25 DIAGNOSIS — I42.0 CARDIOMYOPATHY, DILATED: ICD-10-CM

## 2019-07-25 LAB
ALBUMIN SERPL BCP-MCNC: 3.5 G/DL (ref 3.5–5.2)
ALP SERPL-CCNC: 93 U/L (ref 55–135)
ALT SERPL W/O P-5'-P-CCNC: 18 U/L (ref 10–44)
ANION GAP SERPL CALC-SCNC: 8 MMOL/L (ref 8–16)
AST SERPL-CCNC: 17 U/L (ref 10–40)
BILIRUB SERPL-MCNC: 0.3 MG/DL (ref 0.1–1)
BUN SERPL-MCNC: 19 MG/DL (ref 8–23)
CALCIUM SERPL-MCNC: 9.6 MG/DL (ref 8.7–10.5)
CHLORIDE SERPL-SCNC: 106 MMOL/L (ref 95–110)
CHOLEST SERPL-MCNC: 165 MG/DL (ref 120–199)
CHOLEST/HDLC SERPL: 3.6 {RATIO} (ref 2–5)
CO2 SERPL-SCNC: 25 MMOL/L (ref 23–29)
CREAT SERPL-MCNC: 1 MG/DL (ref 0.5–1.4)
EST. GFR  (AFRICAN AMERICAN): >60 ML/MIN/1.73 M^2
EST. GFR  (NON AFRICAN AMERICAN): >60 ML/MIN/1.73 M^2
ESTIMATED AVG GLUCOSE: 157 MG/DL (ref 68–131)
GLUCOSE SERPL-MCNC: 160 MG/DL (ref 70–110)
HBA1C MFR BLD HPLC: 7.1 % (ref 4–5.6)
HDLC SERPL-MCNC: 46 MG/DL (ref 40–75)
HDLC SERPL: 27.9 % (ref 20–50)
LDLC SERPL CALC-MCNC: 95.2 MG/DL (ref 63–159)
NONHDLC SERPL-MCNC: 119 MG/DL
POTASSIUM SERPL-SCNC: 4.1 MMOL/L (ref 3.5–5.1)
PROT SERPL-MCNC: 6.3 G/DL (ref 6–8.4)
SODIUM SERPL-SCNC: 139 MMOL/L (ref 136–145)
TRIGL SERPL-MCNC: 119 MG/DL (ref 30–150)

## 2019-07-25 PROCEDURE — 80053 COMPREHEN METABOLIC PANEL: CPT

## 2019-07-25 PROCEDURE — 83036 HEMOGLOBIN GLYCOSYLATED A1C: CPT

## 2019-07-25 PROCEDURE — 36415 COLL VENOUS BLD VENIPUNCTURE: CPT

## 2019-07-25 PROCEDURE — 80061 LIPID PANEL: CPT

## 2019-07-25 NOTE — PROGRESS NOTES
Patient's [employer] insurance denied in-lab study proceed with Home Sleep Study 3 night. Telephoned to advise patient, no answer. Sent message via my [email or web address].   Orders Placed This Encounter   Procedures    Home Sleep Studies     Standing Status:   Future     Standing Expiration Date:   7/25/2020     Scheduling Instructions:      3 night home sleep study     1. Sleep-disordered breathing  Home Sleep Studies   2. Daytime sleepiness  Home Sleep Studies   3. Morbid obesity with BMI of 40.0-44.9, adult  Home Sleep Studies   4. Snoring  Home Sleep Studies   5. PAF (paroxysmal atrial fibrillation)  Home Sleep Studies   6. Essential hypertension  Home Sleep Studies   7. Mild concentric left ventricular hypertrophy (LVH)  Home Sleep Studies   8. Type 2 diabetes mellitus without complication, without long-term current use of insulin  Home Sleep Studies   9. Bipolar affective disorder, remission status unspecified  Home Sleep Studies   10. Cardiomyopathy, dilated  Home Sleep Studies       Insurance denial:   Mac Pete 07/23/2019 12:22 PM (20 hours ago) Note Type: Clinical Note   L3 decision noted: [id number] Deny in lab sleep study not medically necessary by interqual criteria A Home sleep study can be done based on the information your doctor gave us. Studies have show n that for many people home sleep studies work well. Your doctor did not give us medical reasons why the home test would not work for you. Some of the reasons to do this test in a facility may include medical conditions like heart failure or lung disease, having a body mass index (BMI) greater than 50, or neuromuscular problems that might affect your breathing muscles, for which home testing would not be appropriate. For that reason your request for in facility sleep study is denied. Ben 7/23/2019     MCKAY Case offered p2p,appeals   activity generated,hipaa,disc   For peer call [phone]  mac

## 2019-07-25 NOTE — TELEPHONE ENCOUNTER
Results are still pending    ----- Message from [place] sent at 7/25/2019  8:48 AM CDT -----  .Type:  Patient Returning Call    Who Called:self  Who Left Message for Patient:  Does the patient know what this is regarding?:results  Would the patient rather a call back or a response via [facility]? call  Best Call Back Number:.[phone] (home)   Additional Information:

## 2019-08-05 ENCOUNTER — PROCEDURE VISIT (OUTPATIENT)
Dept: SLEEP MEDICINE | Facility: CLINIC | Age: 64
End: 2019-08-05
Payer: COMMERCIAL

## 2019-08-05 DIAGNOSIS — E66.01 MORBID OBESITY WITH BMI OF 40.0-44.9, ADULT: ICD-10-CM

## 2019-08-05 DIAGNOSIS — I10 ESSENTIAL HYPERTENSION: ICD-10-CM

## 2019-08-05 DIAGNOSIS — I51.7 MILD CONCENTRIC LEFT VENTRICULAR HYPERTROPHY (LVH): ICD-10-CM

## 2019-08-05 DIAGNOSIS — R40.0 DAYTIME SLEEPINESS: ICD-10-CM

## 2019-08-05 DIAGNOSIS — E11.9 TYPE 2 DIABETES MELLITUS WITHOUT COMPLICATION, WITHOUT LONG-TERM CURRENT USE OF INSULIN: ICD-10-CM

## 2019-08-05 DIAGNOSIS — F31.9 BIPOLAR AFFECTIVE DISORDER, REMISSION STATUS UNSPECIFIED: ICD-10-CM

## 2019-08-05 DIAGNOSIS — R06.83 SNORING: ICD-10-CM

## 2019-08-05 DIAGNOSIS — G47.33 OSA (OBSTRUCTIVE SLEEP APNEA): Primary | ICD-10-CM

## 2019-08-05 DIAGNOSIS — I42.0 CARDIOMYOPATHY, DILATED: ICD-10-CM

## 2019-08-05 DIAGNOSIS — I48.0 PAF (PAROXYSMAL ATRIAL FIBRILLATION): ICD-10-CM

## 2019-08-05 PROCEDURE — 95800 PR SLEEP STUDY, UNATTENDED, RECORD HEART RATE/O2 SAT/RESP ANAL/SLEEP TIME: ICD-10-PCS | Mod: 26,,, | Performed by: INTERNAL MEDICINE

## 2019-08-05 PROCEDURE — 99499 UNLISTED E&M SERVICE: CPT | Mod: S$GLB,,, | Performed by: INTERNAL MEDICINE

## 2019-08-05 PROCEDURE — 95800 SLP STDY UNATTENDED: CPT | Mod: 26,,, | Performed by: INTERNAL MEDICINE

## 2019-08-05 PROCEDURE — 95800 SLP STDY UNATTENDED: CPT

## 2019-08-05 PROCEDURE — 99499 NO LOS: ICD-10-PCS | Mod: S$GLB,,, | Performed by: INTERNAL MEDICINE

## 2019-08-05 NOTE — PROCEDURES
Home Sleep Studies  Date/Time: 8/5/2019 5:52 PM  Performed by: Regis Cheng MD  Authorized by: Addie Brennan NP       PHYSICIAN INTERPRETATION AND COMMENTS: Findings are consistent with moderate, non-positional obstructive  sleep apnea (INDIRA). Night # 1 had no diagnostic data: AHI Night#2: 16.0 events per hour with 5.5 hr of sleep. Saturation bridger  was 83.0%. Proceed with CPAP titration or auto PAP 5-20 cm water pressure with close follow-up.  CLINICAL HISTORY: 63 year old female presented with: Snoring and daytime sleepiness. Cardiovascular risk factors  diabetes, hypertension, hyperlipidemia, cardiomyopathy, atrial fibrillation. Stop Bang score: 5. 16 inch neck, BMI of 39, an  [place] sleepiness score of 11, history of hypertension, diabetes and symptoms of nocturnal snoring and waking up choking.  Based on the clinical history, the patient has a high pre-test probability of having moderate INDIRA.  SLEEP STUDY FINDINGS: Patient underwent a two night Home Sleep Test and by behavioral criteria, slept for  approximately 5.5 hours, with a sleep latency of 26 minutes and a sleep efficiency of 73.5%. Moderate sleep disordered  breathing (AHI=16) is noted based on a 4% hypopnea desaturation criteria. The patient slept supine 61.0% of the night based  on valid recording time of 5.47 hours. When considering more subtle measures of sleep disordered breathing, the overall  respiratory disturbance index is also moderate (RDI=34) based on a 1% hypopnea desaturation criteria with confirmation by  surrogate arousal indicators. The apneas/hypopneas are accompanied by minimal oxygen desaturation (percent time below 90%  SpO2: 3.3%, Min SpO2: 83.0%). The average desaturation across all sleep disordered breathing events is 3.3%. Snoring occurs  for 15.4% (30 dB) of the study, 9.3% is very loud. The mean pulse rate is 62 BPM, with very frequent pulse rate variability (63  events with >= 6 BPM increase/decrease per  hour).  TREATMENT CONSIDERATIONS: Consider nasal continuous positive airway pressure (CPAP/AutoPAP) as the first  treatment option based on the AHI severity, daytime somnolence and co-morbidities. A mandibular advancement splint (MAS)  or referral to an ENT surgeon for modification to the airway should be considered to reduce daytime somnolence and the  potential contribution of INDIRA on existing diseases if the patient prefers an alternative therapy or the CPAP trial is unsuccessful.  DISEASE MANAGEMENT CONSIDERATIONS: None.

## 2019-08-05 NOTE — PROGRESS NOTES
PHYSICIAN INTERPRETATION AND COMMENTS: Findings are consistent with moderate, non-positional obstructive  sleep apnea (INDIRA). Night # 1 had no diagnostic data: AHI Night#2: 16.0 events per hour with 5.5 hr of sleep. Saturation bridger  was 83.0%. Proceed with CPAP titration or auto PAP 5-20 cm water pressure with close follow-up.  CLINICAL HISTORY: 63 year old female presented with: Snoring and daytime sleepiness. Cardiovascular risk factors  diabetes, hypertension, hyperlipidemia, cardiomyopathy, atrial fibrillation. Stop Bang score: 5. 16 inch neck, BMI of 39, an  [place] sleepiness score of 11, history of hypertension, diabetes and symptoms of nocturnal snoring and waking up choking.  Based on the clinical history, the patient has a high pre-test probability of having moderate INDIRA.  SLEEP STUDY FINDINGS: Patient underwent a two night Home Sleep Test and by behavioral criteria, slept for  approximately 5.5 hours, with a sleep latency of 26 minutes and a sleep efficiency of 73.5%. Moderate sleep disordered  breathing (AHI=16) is noted based on a 4% hypopnea desaturation criteria. The patient slept supine 61.0% of the night based  on valid recording time of 5.47 hours. When considering more subtle measures of sleep disordered breathing, the overall  respiratory disturbance index is also moderate (RDI=34) based on a 1% hypopnea desaturation criteria with confirmation by  surrogate arousal indicators. The apneas/hypopneas are accompanied by minimal oxygen desaturation (percent time below 90%  SpO2: 3.3%, Min SpO2: 83.0%). The average desaturation across all sleep disordered breathing events is 3.3%. Snoring occurs  for 15.4% (30 dB) of the study, 9.3% is very loud. The mean pulse rate is 62 BPM, with very frequent pulse rate variability (63  events with >= 6 BPM increase/decrease per hour).  TREATMENT CONSIDERATIONS: Consider nasal continuous positive airway pressure (CPAP/AutoPAP) as the first  treatment option based on  the AHI severity, daytime somnolence and co-morbidities. A mandibular advancement splint (MAS)  or referral to an ENT surgeon for modification to the airway should be considered to reduce daytime somnolence and the  potential contribution of INDIRA on existing diseases if the patient prefers an alternative therapy or the CPAP trial is unsuccessful.  DISEASE MANAGEMENT CONSIDERATIONS: None.

## 2019-08-05 NOTE — Clinical Note
PHYSICIAN INTERPRETATION AND COMMENTS: Findings are consistent with moderate, non-positional obstructivesleep apnea (INDIRA). Night # 1 had no diagnostic data: AHI Night#2: 16.0 events per hour with 5.5 hr of sleep. Saturation nadirwas 83.0%. Proceed with CPAP titration or auto PAP 5-20 cm water pressure with close follow-up.CLINICAL HISTORY: 63 year old female presented with: Snoring and daytime sleepiness. Cardiovascular risk factorsdiabetes, hypertension, hyperlipidemia, cardiomyopathy, atrial fibrillation. Stop Bang score: 5. 16 inch neck, BMI of 39, [place] sleepiness score of 11, history of hypertension, diabetes and symptoms of nocturnal snoring and waking up choking.Based on the clinical history, the patient has a high pre-test probability of having moderate INDIRA.SLEEP STUDY FINDINGS: Patient underwent a two night Home Sleep Test and by behavioral criteria, slept forapproximately 5.5 hours, with a sleep latency of 26 minutes and a sleep efficiency of 73.5%. Moderate sleep disorderedbre

## 2019-08-06 ENCOUNTER — TELEPHONE (OUTPATIENT)
Dept: PULMONOLOGY | Facility: CLINIC | Age: 64
End: 2019-08-06

## 2019-08-06 NOTE — TELEPHONE ENCOUNTER
----- Message from Celia Tong sent at 8/6/2019 11:47 AM CDT -----  Contact: Patient   Type:  Patient Returning Call    Who Called: Keysha  Who Left Message for Patient:   Does the patient know what this is regarding?: Alanis  Would the patient rather a call back or a response via [employer]?  Sleep study  Best Call Back Number: Please call her at [phone]  Additional Information: n/a

## 2019-08-06 NOTE — TELEPHONE ENCOUNTER
----- Message from Gale Qiu sent at 8/6/2019 11:38 AM CDT -----  Contact: pt  The pt has questions concerning her sleep study machine, no additional info given and can be reached at [phone]//[email or web address]

## 2019-08-09 ENCOUNTER — OFFICE VISIT (OUTPATIENT)
Dept: PULMONOLOGY | Facility: CLINIC | Age: 64
End: 2019-08-09
Payer: COMMERCIAL

## 2019-08-09 VITALS
OXYGEN SATURATION: 97 % | HEART RATE: 80 BPM | WEIGHT: 236.44 LBS | SYSTOLIC BLOOD PRESSURE: 120 MMHG | BODY MASS INDEX: 40.37 KG/M2 | HEIGHT: 64 IN | RESPIRATION RATE: 18 BRPM | DIASTOLIC BLOOD PRESSURE: 62 MMHG

## 2019-08-09 DIAGNOSIS — G47.33 OBSTRUCTIVE SLEEP APNEA: Primary | ICD-10-CM

## 2019-08-09 DIAGNOSIS — I48.0 PAF (PAROXYSMAL ATRIAL FIBRILLATION): ICD-10-CM

## 2019-08-09 DIAGNOSIS — E78.2 MIXED HYPERLIPIDEMIA: ICD-10-CM

## 2019-08-09 DIAGNOSIS — F31.9 BIPOLAR AFFECTIVE DISORDER, REMISSION STATUS UNSPECIFIED: Chronic | ICD-10-CM

## 2019-08-09 DIAGNOSIS — I42.0 CARDIOMYOPATHY, DILATED: ICD-10-CM

## 2019-08-09 DIAGNOSIS — E11.9 TYPE 2 DIABETES MELLITUS WITHOUT COMPLICATION, WITHOUT LONG-TERM CURRENT USE OF INSULIN: ICD-10-CM

## 2019-08-09 PROCEDURE — 99214 PR OFFICE/OUTPT VISIT, EST, LEVL IV, 30-39 MIN: ICD-10-PCS | Mod: S$GLB,,, | Performed by: NURSE PRACTITIONER

## 2019-08-09 PROCEDURE — 3078F PR MOST RECENT DIASTOLIC BLOOD PRESSURE < 80 MM HG: ICD-10-PCS | Mod: CPTII,S$GLB,, | Performed by: NURSE PRACTITIONER

## 2019-08-09 PROCEDURE — 99214 OFFICE O/P EST MOD 30 MIN: CPT | Mod: S$GLB,,, | Performed by: NURSE PRACTITIONER

## 2019-08-09 PROCEDURE — 3074F SYST BP LT 130 MM HG: CPT | Mod: CPTII,S$GLB,, | Performed by: NURSE PRACTITIONER

## 2019-08-09 PROCEDURE — 3008F PR BODY MASS INDEX (BMI) DOCUMENTED: ICD-10-PCS | Mod: CPTII,S$GLB,, | Performed by: NURSE PRACTITIONER

## 2019-08-09 PROCEDURE — 3074F PR MOST RECENT SYSTOLIC BLOOD PRESSURE < 130 MM HG: ICD-10-PCS | Mod: CPTII,S$GLB,, | Performed by: NURSE PRACTITIONER

## 2019-08-09 PROCEDURE — 3045F PR MOST RECENT HEMOGLOBIN A1C LEVEL 7.0-9.0%: CPT | Mod: CPTII,S$GLB,, | Performed by: NURSE PRACTITIONER

## 2019-08-09 PROCEDURE — 99999 PR PBB SHADOW E&M-EST. PATIENT-LVL IV: CPT | Mod: PBBFAC,,, | Performed by: NURSE PRACTITIONER

## 2019-08-09 PROCEDURE — 3078F DIAST BP <80 MM HG: CPT | Mod: CPTII,S$GLB,, | Performed by: NURSE PRACTITIONER

## 2019-08-09 PROCEDURE — 3008F BODY MASS INDEX DOCD: CPT | Mod: CPTII,S$GLB,, | Performed by: NURSE PRACTITIONER

## 2019-08-09 PROCEDURE — 3045F PR MOST RECENT HEMOGLOBIN A1C LEVEL 7.0-9.0%: ICD-10-PCS | Mod: CPTII,S$GLB,, | Performed by: NURSE PRACTITIONER

## 2019-08-09 PROCEDURE — 99999 PR PBB SHADOW E&M-EST. PATIENT-LVL IV: ICD-10-PCS | Mod: PBBFAC,,, | Performed by: NURSE PRACTITIONER

## 2019-08-09 RX ORDER — LANCETS 28 GAUGE
EACH MISCELLANEOUS
COMMUNITY
Start: 2019-08-08

## 2019-08-09 NOTE — PATIENT INSTRUCTIONS
What Are Snoring and Obstructive Sleep Apnea?  If youve ever had a stuffed-up nose, you know the feeling of trying to breathe through a very narrow passageway. This is what happens in your throat when you snore. While you sleep, structures in your throat partially block your air passage, making the passage narrow and hard to breathe through. If the entire passage becomes blocked and you cant breathe at all, you have sleep apnea.      Snoring Obstructive sleep apnea   Snoring  If your throat structures are too large or the muscles relax too much during sleep, the air passage may be partially blocked. As air from the nose or mouth passes around this blockage, the throat structures vibrate, causing the familiar sound of snoring. At times, this sound can be so loud that snorers wake up others, or even themselves, during the night. Snoring gets worse as more and more of the air passage is blocked.  Obstructive sleep apnea  If the structures completely block the throat, air cant flow to the lungs at all. This is called apnea (meaning no breathing). Since the lungs arent getting fresh air, the brain tells the body to wake up just enough to tighten the muscles and unblock the air passage. With a loud gasp, breathing begins again. This process may be repeated over and over again throughout the night, making your sleep fragmented with a lighter stage of sleep. Even though you do not remember waking up many times during the night to a lighter sleep, you feel tired the next day. The lack of sleep and fresh air can also strain your lungs, heart, and other organs, leading to problems such as high blood pressure, heart attack, or stroke.  Problems in the nose and jaw  Problems in the structure of the nose may obstruct breathing. A crooked (deviated) septum or swollen turbinates can make snoring worse or lead to apnea. Also, a receding jaw may make the tongue sit too far back, so its more likely to block the airway when  youre asleep.        Date Last Reviewed: 7/18/2015  © 0443-0554 [employer]. [address] [place] [address]. All rights reserved. This information is not intended as a substitute for professional medical care. Always follow your healthcare professional's instructions.        Continuous Positive Air Pressure (CPAP)     A mask over the nose gently directs air into the throat to keep the airway open.   Continuous positive air pressure (CPAP) uses gentle air pressure to hold the airway open. CPAP is often the most effective treatment for sleep apnea and severe snoring. It works very well for many people. But keep in mind that it can take several adjustments before the setup is right for you.  How CPAP works  The CPAP machine  is a small portable pump beside the bed. The pump sends air through a hose, which is held over your nose and mouth by a mask. Mild air pressure is gently pushed through your airway. The air pressure nudges sagging tissues aside. This widens the airway so you can breathe better. CPAP may be combined with other kinds of therapy for sleep apnea.  Types of air pressure treatments  There are different types of CPAP. Your doctor or CPAP technician will help you decide which type is best for you:  · Basic CPAP keeps the pressure constant all night long.  · A bilevel device (BiPAP) provides more pressure when you breathe in and less when you breathe out. A BiPAP machine also may be set to provide automatic breaths to maintain breathing if you stop breathing while sleeping.  · An autoCPAP device automatically adjusts pressure throughout the night and in response to changes such as body position, sleep stage, and snoring.  Date Last Reviewed: 8/10/2015  © 7677-6856 [employer]. [address] [place], [place] [id number]. All rights reserved. This information is not intended as a substitute for professional medical care. Always follow your healthcare professional's  instructions.

## 2019-08-09 NOTE — ASSESSMENT & PLAN NOTE
Followed by cardiology  8/1/2019 Home Sleep Study moderate obstructive sleep apnea   8/9/2019 begin auto CPAP 5-20 cm

## 2019-08-09 NOTE — ASSESSMENT & PLAN NOTE
home sleep study 7/31/2019 and 8/1/2019.  No data on the 1st night of study.  Second night data revealed moderate obstructive sleep apnea with AHI 16.0 events an hour.   Begin auto CPAP 5-20 cm. Nasal CPAP mask.   HME: [facility]

## 2019-08-09 NOTE — PROGRESS NOTES
Subjective:      Patient ID: Keysha Dewey is a 63 y.o. female.    Chief Complaint: Sleep Apnea    HPI: Keysha Dewey is here for follow up for INDIRA with review of home sleep study 7/31/2019 and 8/1/2019.  No data on the 1st night of study.  Second night data revealed moderate obstructive sleep apnea with AHI 16.0 events an hour. Patient did not perform 3rd night since error message and machine would not turn on, called tech support every night, never able to speak to anyone.   Begin auto CPAP 5-20 cm.    Catherine 8    Previous Report Reviewed: lab reports and office notes     Past Medical History: The following portions of the patient's history were reviewed and updated as appropriate:   She  has a past surgical history that includes Gastric bypass (2006); Hysterectomy (2002); Cardiac catheterization; and Left heart catheterization (Left, 12/14/2018).  Her family history is not on file.  She  reports that she has never smoked. She does not have any smokeless tobacco history on file. She reports that she does not drink alcohol or use drugs.  She has a current medication list which includes the following prescription(s): lancets, amlodipine, apixaban, aspirin, buspirone, carbamazepine, cyanocobalamin (vitamin b-12), exenatide, ezetimibe, metoprolol tartrate, multivitamin, nateglinide, sitagliptin, timolol, turmeric (bulk), and valsartan-hydrochlorothiazide.  She is allergic to lipitor [atorvastatin].    The following portions of the patient's history were reviewed and updated as appropriate: allergies, current medications, past family history, past medical history, past social history, past surgical history and problem list.    Review of Systems   Constitutional: Negative for fever, chills, weight loss, weight gain, activity change, appetite change, fatigue and night sweats.   HENT: Negative for postnasal drip, rhinorrhea, sinus pressure, voice change and congestion.    Eyes: Negative for redness and itching.  "  Respiratory: Negative for snoring, cough, sputum production, chest tightness, shortness of breath, wheezing, orthopnea, asthma nighttime symptoms, dyspnea on extertion, use of rescue inhaler and somnolence.    Cardiovascular: Negative.  Negative for chest pain, palpitations and leg swelling.   Genitourinary: Negative for difficulty urinating and hematuria.   Endocrine: Negative for cold intolerance and heat intolerance.    Musculoskeletal: Negative for arthralgias, gait problem, joint swelling and myalgias.   Skin: Negative.    Gastrointestinal: Negative for nausea, vomiting, abdominal pain and acid reflux.   Neurological: Negative for dizziness, weakness, light-headedness and headaches.   Hematological: Negative for adenopathy. No excessive bruising.   All other systems reviewed and are negative.     Objective:   /62   Pulse 80   Resp 18   Ht 5' 4" (1.626 m)   Wt 107.2 kg (236 lb 7.1 oz)   SpO2 97%   BMI 40.59 kg/m²   Physical Exam   Constitutional: She is oriented to person, place, and time. She appears well-developed and well-nourished. She is active and cooperative.  Non-toxic appearance. She does not appear ill. No distress.   HENT:   Head: Normocephalic.   Right Ear: External ear normal.   Left Ear: External ear normal.   Nose: Nose normal.   Mouth/Throat: Oropharynx is clear and moist. No oropharyngeal exudate.   Eyes: Conjunctivae are normal.   Neck: Normal range of motion. Neck supple.   Cardiovascular: Normal rate, regular rhythm, normal heart sounds and intact distal pulses.   Pulmonary/Chest: Effort normal and breath sounds normal. No stridor.   Abdominal: Soft.   Musculoskeletal: Normal range of motion.   Lymphadenopathy:     She has no cervical adenopathy.   Neurological: She is alert and oriented to person, place, and time.   Skin: Skin is warm and dry.   Psychiatric: She has a normal mood and affect. Her behavior is normal. Judgment and thought content normal.   Vitals " reviewed.    Personal Diagnostic Review  Home sleep study 7/31/2019 and 8/1/2019  PHYSICIAN INTERPRETATION AND COMMENTS: Findings are consistent with moderate, non-positional obstructive  sleep apnea (INDIRA). Night # 1 had no diagnostic data: AHI Night#2: 16.0 events per hour with 5.5 hr of sleep. Saturation bridger  was 83.0%. Proceed with CPAP titration or auto PAP 5-20 cm water pressure with close follow-up.  CLINICAL HISTORY: 63 year old female presented with: Snoring and daytime sleepiness. Cardiovascular risk factors  diabetes, hypertension, hyperlipidemia, cardiomyopathy, atrial fibrillation. Stop Bang score: 5. 16 inch neck, BMI of 39, an  Aynor sleepiness score of 11, history of hypertension, diabetes and symptoms of nocturnal snoring and waking up choking.  Based on the clinical history, the patient has a high pre-test probability of having moderate INDIRA.  SLEEP STUDY FINDINGS: Patient underwent a two night Home Sleep Test and by behavioral criteria, slept for  approximately 5.5 hours, with a sleep latency of 26 minutes and a sleep efficiency of 73.5%. Moderate sleep disordered  breathing (AHI=16) is noted based on a 4% hypopnea desaturation criteria. The patient slept supine 61.0% of the night based  on valid recording time of 5.47 hours. When considering more subtle measures of sleep disordered breathing, the overall  respiratory disturbance index is also moderate (RDI=34) based on a 1% hypopnea desaturation criteria with confirmation by  surrogate arousal indicators. The apneas/hypopneas are accompanied by minimal oxygen desaturation (percent time below 90%  SpO2: 3.3%, Min SpO2: 83.0%). The average desaturation across all sleep disordered breathing events is 3.3%. Snoring occurs  for 15.4% (30 dB) of the study, 9.3% is very loud. The mean pulse rate is 62 BPM, with very frequent pulse rate variability (63  events with >= 6 BPM increase/decrease per hour).  TREATMENT CONSIDERATIONS: Consider nasal  continuous positive airway pressure (CPAP/AutoPAP) as the first  treatment option based on the AHI severity, daytime somnolence and co-morbidities. A mandibular advancement splint (MAS)  or referral to an ENT surgeon for modification to the airway should be considered to reduce daytime somnolence and the  potential contribution of INDIRA on existing diseases if the patient prefers an alternative therapy or the CPAP trial is unsuccessful.  DISEASE MANAGEMENT CONSIDERATIONS: None.    Assessment:     1. Obstructive sleep apnea    2. Cardiomyopathy, dilated    3. Bipolar affective disorder, remission status unspecified    4. Mixed hyperlipidemia    5. PAF (paroxysmal atrial fibrillation)    6. Type 2 diabetes mellitus without complication, without long-term current use of insulin      Orders Placed This Encounter   Procedures    CPAP FOR HOME USE     Order Specific Question:   Type:     Answer:   Auto CPAP     Order Specific Question:   Auto CPAP pressure setting range (cmH20):     Answer:   5-20     Order Specific Question:   Length of need (1-99 months):     Answer:   99     Order Specific Question:   Humidification:     Answer:   Heated     Order Specific Question:   Type of mask:     Answer:   Nasal     Order Specific Question:   Headgear?     Answer:   Yes     Order Specific Question:   Tubing?     Answer:   Yes     Order Specific Question:   Humidifier chamber?     Answer:   Yes     Order Specific Question:   Chin strap?     Answer:   Yes     Order Specific Question:   Filters?     Answer:   Yes     Order Specific Question:   Cushions?     Answer:   Yes     Plan:     Problem List Items Addressed This Visit     Type 2 diabetes mellitus, without long-term current use of insulin    PAF (paroxysmal atrial fibrillation)     Followed by cardiology  8/1/2019 Home Sleep Study moderate obstructive sleep apnea   8/9/2019 begin auto CPAP 5-20 cm          Obstructive sleep apnea - Primary     home sleep study 7/31/2019 and  8/1/2019.  No data on the 1st night of study.  Second night data revealed moderate obstructive sleep apnea with AHI 16.0 events an hour.   Begin auto CPAP 5-20 cm. Nasal CPAP mask.   HME: [facility]         Relevant Orders    CPAP FOR HOME USE    Mixed hyperlipidemia    Cardiomyopathy, dilated    Bipolar disorder (Chronic)         (DME) - [facility]  Reviewed therapeutic goals for positive airway pressure therapy Auto CPAP  Ideal is usage 100% of nights for 6 - 8 hours per night. Minimum usage is 70% of night for at least 4 hours per night used.     Follow up in about 9 weeks (around 10/11/2019) for CPAP compliance download after initial set up.

## 2019-08-15 ENCOUNTER — OFFICE VISIT (OUTPATIENT)
Dept: CARDIOLOGY | Facility: CLINIC | Age: 64
End: 2019-08-15
Payer: COMMERCIAL

## 2019-08-15 VITALS
OXYGEN SATURATION: 97 % | WEIGHT: 234.56 LBS | HEART RATE: 70 BPM | BODY MASS INDEX: 40.04 KG/M2 | HEIGHT: 64 IN | DIASTOLIC BLOOD PRESSURE: 60 MMHG | SYSTOLIC BLOOD PRESSURE: 98 MMHG

## 2019-08-15 DIAGNOSIS — I48.0 PAF (PAROXYSMAL ATRIAL FIBRILLATION): ICD-10-CM

## 2019-08-15 DIAGNOSIS — I51.7 MILD CONCENTRIC LEFT VENTRICULAR HYPERTROPHY (LVH): ICD-10-CM

## 2019-08-15 DIAGNOSIS — I42.0 CARDIOMYOPATHY, DILATED: ICD-10-CM

## 2019-08-15 DIAGNOSIS — I10 ESSENTIAL HYPERTENSION: ICD-10-CM

## 2019-08-15 DIAGNOSIS — E78.2 MIXED HYPERLIPIDEMIA: ICD-10-CM

## 2019-08-15 DIAGNOSIS — E11.9 TYPE 2 DIABETES MELLITUS WITHOUT COMPLICATION, WITHOUT LONG-TERM CURRENT USE OF INSULIN: ICD-10-CM

## 2019-08-15 DIAGNOSIS — Z01.818 PRE-OPERATIVE CLEARANCE: Primary | ICD-10-CM

## 2019-08-15 DIAGNOSIS — Z78.9 STATIN INTOLERANCE: ICD-10-CM

## 2019-08-15 DIAGNOSIS — G47.33 OBSTRUCTIVE SLEEP APNEA: ICD-10-CM

## 2019-08-15 DIAGNOSIS — F31.9 BIPOLAR AFFECTIVE DISORDER, REMISSION STATUS UNSPECIFIED: Chronic | ICD-10-CM

## 2019-08-15 PROCEDURE — 3008F PR BODY MASS INDEX (BMI) DOCUMENTED: ICD-10-PCS | Mod: CPTII,S$GLB,, | Performed by: PHYSICIAN ASSISTANT

## 2019-08-15 PROCEDURE — 3074F SYST BP LT 130 MM HG: CPT | Mod: CPTII,S$GLB,, | Performed by: PHYSICIAN ASSISTANT

## 2019-08-15 PROCEDURE — 3078F DIAST BP <80 MM HG: CPT | Mod: CPTII,S$GLB,, | Performed by: PHYSICIAN ASSISTANT

## 2019-08-15 PROCEDURE — 99214 PR OFFICE/OUTPT VISIT, EST, LEVL IV, 30-39 MIN: ICD-10-PCS | Mod: S$GLB,,, | Performed by: PHYSICIAN ASSISTANT

## 2019-08-15 PROCEDURE — 99214 OFFICE O/P EST MOD 30 MIN: CPT | Mod: S$GLB,,, | Performed by: PHYSICIAN ASSISTANT

## 2019-08-15 PROCEDURE — 99999 PR PBB SHADOW E&M-EST. PATIENT-LVL IV: CPT | Mod: PBBFAC,,, | Performed by: PHYSICIAN ASSISTANT

## 2019-08-15 PROCEDURE — 3045F PR MOST RECENT HEMOGLOBIN A1C LEVEL 7.0-9.0%: CPT | Mod: CPTII,S$GLB,, | Performed by: PHYSICIAN ASSISTANT

## 2019-08-15 PROCEDURE — 3074F PR MOST RECENT SYSTOLIC BLOOD PRESSURE < 130 MM HG: ICD-10-PCS | Mod: CPTII,S$GLB,, | Performed by: PHYSICIAN ASSISTANT

## 2019-08-15 PROCEDURE — 3078F PR MOST RECENT DIASTOLIC BLOOD PRESSURE < 80 MM HG: ICD-10-PCS | Mod: CPTII,S$GLB,, | Performed by: PHYSICIAN ASSISTANT

## 2019-08-15 PROCEDURE — 3008F BODY MASS INDEX DOCD: CPT | Mod: CPTII,S$GLB,, | Performed by: PHYSICIAN ASSISTANT

## 2019-08-15 PROCEDURE — 3045F PR MOST RECENT HEMOGLOBIN A1C LEVEL 7.0-9.0%: ICD-10-PCS | Mod: CPTII,S$GLB,, | Performed by: PHYSICIAN ASSISTANT

## 2019-08-15 PROCEDURE — 99999 PR PBB SHADOW E&M-EST. PATIENT-LVL IV: ICD-10-PCS | Mod: PBBFAC,,, | Performed by: PHYSICIAN ASSISTANT

## 2019-08-15 NOTE — PROGRESS NOTES
Subjective:    Patient ID:  Keysha Dewey is a 63 y.o. female who presents for follow-up of CMPY, HTN, pre-op clearance      HPI   Ms. Dewey is a 63 year old female patient whose current medical conditions include PAF, INDIRA, bipolar disorder, dilated CMPY, DM, HTN, hyperlipidemia, and statin intolerance who presents today for routine follow-up. Patient is scheduled to undergo right knee replacement by Dr. Leavitt on 8/22/19. Surgery was originally scheduled for 7/25/19 but delayed due to elevated A1C. She returns today and states she is doing well. No chest pain, SOB, or other anginal signs or symptoms. No lightheadedness, dizziness, palpitations, near syncope, or syncope. No PND or abdominal bloating. Chronically sleeps inclined. Occasional LE ankle edema. BP on lower side but patient asymptomatic. Patient reports compliance with her medications. Previously had gastric bypass in 2005 and hysterectomy in 2002 without any complications. Exercises 2-3 times weekly on a bike without any symptoms. 2D echo 1/19 showed recovery of EF, 60-65%. LHC in 12/18 showed normal coronaries.     Recent labs reviewed, lipids ok. CMP stable. A1C 7.1, 7.0 by recent check by PCP per patient.      Review of Systems   Constitution: Negative for chills, decreased appetite, fever and malaise/fatigue.   HENT: Negative for congestion, hoarse voice and sore throat.    Eyes: Negative for blurred vision and discharge.   Cardiovascular: Negative for chest pain, claudication, cyanosis, dyspnea on exertion, irregular heartbeat, leg swelling, near-syncope, orthopnea, palpitations and paroxysmal nocturnal dyspnea.   Respiratory: Negative for cough, hemoptysis, shortness of breath, snoring, sputum production and wheezing.    Endocrine: Negative for cold intolerance and heat intolerance.   Hematologic/Lymphatic: Negative for bleeding problem. Does not bruise/bleed easily.   Skin: Negative for rash.   Musculoskeletal: Positive for arthritis and joint  "pain. Negative for back pain, joint swelling, muscle cramps, muscle weakness and myalgias.   Gastrointestinal: Negative for abdominal pain, constipation, diarrhea, heartburn, melena and nausea.   Genitourinary: Negative for hematuria.   Neurological: Negative for dizziness, focal weakness, headaches, light-headedness, loss of balance, numbness, paresthesias, seizures and weakness.   Psychiatric/Behavioral: Negative for memory loss. The patient does not have insomnia.    Allergic/Immunologic: Negative for hives.     BP 98/60 (BP Location: Left arm)   Pulse 70   Ht 5' 4" (1.626 m)   Wt 106.4 kg (234 lb 9.1 oz)   SpO2 97%   BMI 40.26 kg/m²     Objective:    Physical Exam   Constitutional: She is oriented to person, place, and time. She appears well-developed and well-nourished. No distress.   HENT:   Head: Normocephalic and atraumatic.   Eyes: Pupils are equal, round, and reactive to light. Right eye exhibits no discharge. Left eye exhibits no discharge.   Neck: Neck supple. No JVD present.   Cardiovascular: Normal rate, regular rhythm, S1 normal, S2 normal and normal heart sounds.   No murmur heard.  Pulmonary/Chest: Effort normal and breath sounds normal. No respiratory distress. She has no wheezes. She has no rales.   Abdominal: Soft. She exhibits no distension.   Musculoskeletal: She exhibits no edema.   Neurological: She is alert and oriented to person, place, and time.   Skin: Skin is warm and dry. She is not diaphoretic. No erythema.   Psychiatric: She has a normal mood and affect. Her behavior is normal. Thought content normal.   Nursing note and vitals reviewed.    2D Echo CONCLUSIONS     1 - Concentric hypertrophy.     2 - No wall motion abnormalities.     3 - Normal left ventricular systolic function (EF 60-65%).     4 - Normal left ventricular diastolic function.     5 - Normal right ventricular systolic function .     6 - The estimated PA systolic pressure is 20 mmHg.     E. Angiographic " Results     Diagnostic:          Patient has a right dominant coronary artery.        The coronary vessels are all normal.        - Left Main Coronary Artery:             The LM is normal. There is WILEY 3 flow.     - Left Anterior Descending Artery:             The LAD is normal. There is WILEY 3 flow.     - Left Circumflex Artery:             The LCX is normal. There is WILEY 3 flow.     - Right Coronary Artery:             The RCA is normal. There is WILEY      Chemistry        Component Value Date/Time     07/25/2019 0801    K 4.1 07/25/2019 0801     07/25/2019 0801    CO2 25 07/25/2019 0801    BUN 19 07/25/2019 0801    CREATININE 1.0 07/25/2019 0801     (H) 07/25/2019 0801        Component Value Date/Time    CALCIUM 9.6 07/25/2019 0801    ALKPHOS 93 07/25/2019 0801    AST 17 07/25/2019 0801    ALT 18 07/25/2019 0801    BILITOT 0.3 07/25/2019 0801    ESTGFRAFRICA >60.0 07/25/2019 0801    EGFRNONAA >60.0 07/25/2019 0801        Lab Results   Component Value Date    CHOL 165 07/25/2019    CHOL 175 04/04/2019    CHOL 173 12/13/2018     Lab Results   Component Value Date    HDL 46 07/25/2019    HDL 48 04/04/2019    HDL 42 12/13/2018     Lab Results   Component Value Date    LDLCALC 95.2 07/25/2019    LDLCALC 101.4 04/04/2019    LDLCALC 100.4 12/13/2018     Lab Results   Component Value Date    TRIG 119 07/25/2019    TRIG 128 04/04/2019    TRIG 153 (H) 12/13/2018     Lab Results   Component Value Date    CHOLHDL 27.9 07/25/2019    CHOLHDL 27.4 04/04/2019    CHOLHDL 24.3 12/13/2018     Lab Results   Component Value Date    HGBA1C 7.1 (H) 07/25/2019       Assessment:       1. Pre-operative clearance    2. Bipolar affective disorder, remission status unspecified    3. Cardiomyopathy, dilated    4. Essential hypertension    5. Mild concentric left ventricular hypertrophy (LVH)    6. PAF (paroxysmal atrial fibrillation)    7. Mixed hyperlipidemia    8. Type 2 diabetes mellitus without complication, without  long-term current use of insulin    9. Obstructive sleep apnea    10. Statin intolerance      Patient presents for pre-op clearance, procedure initially delayed to elevated A1C. Stable CV wise. No angina/equivalent. Recent echo with normal EF. Normal coronaries on cath in 12/18. Continue same meds/mgmt. Ok to proceed with surgery at low to moderate risk of maci and post OP CV complications.   Plan:   -Continue current medical management and risk factor modification  -Cardiac, low salt diet  -Ok to proceed with surgery at low to moderate risk of maci and post OP CV events, may hold Eliquis 48 hours prior to surgery; resume post-op ASAP  -May hold ASA 5-7 days prior to surgery, resume post procedure ASAP  -RTC 6 months with Dr. Faith with lipid, cmp, A1C      Chart reviewed. Dr. Faith agrees with plan as outlined above.

## 2019-08-21 ENCOUNTER — PATIENT MESSAGE (OUTPATIENT)
Dept: CARDIOLOGY | Facility: CLINIC | Age: 64
End: 2019-08-21

## 2019-08-21 DIAGNOSIS — I10 ESSENTIAL HYPERTENSION: Primary | ICD-10-CM

## 2019-08-21 RX ORDER — VALSARTAN AND HYDROCHLOROTHIAZIDE 80; 12.5 MG/1; MG/1
1 TABLET, FILM COATED ORAL DAILY
Qty: 90 TABLET | Refills: 3 | Status: SHIPPED | OUTPATIENT
Start: 2019-08-21 | End: 2022-01-24 | Stop reason: SDUPTHER

## 2019-08-21 NOTE — TELEPHONE ENCOUNTER
----- Message from Dede Garvey sent at 8/21/2019 12:13 PM CDT -----  Contact: pt  Type:  RX Refill Request    Who Called: Patient  Refill or New Rx:Refill  RX Name and Strength:Valsartan Hydrochloride 80mg/12.5mg  How is the patient currently taking it? (ex. 1XDay):1xday  Is this a 30 day or 90 day RX:90day  Preferred Pharmacy with phone number:[facility]/[place]/[phone]  Local or Mail Order:Local  Ordering Provider:Dr Faith  Would the patient rather a call back or a response via [facility]? Call back  Best Call Back Number:[phone]  Additional Information: pt also have some questions for nurse.

## 2019-08-23 DIAGNOSIS — Z78.9 STATIN INTOLERANCE: ICD-10-CM

## 2019-08-23 DIAGNOSIS — E78.2 MIXED HYPERLIPIDEMIA: ICD-10-CM

## 2019-08-23 RX ORDER — EZETIMIBE 10 MG/1
10 TABLET ORAL DAILY
Qty: 90 TABLET | Refills: 3 | Status: SHIPPED | OUTPATIENT
Start: 2019-08-23 | End: 2020-12-15 | Stop reason: SDUPTHER

## 2019-12-10 ENCOUNTER — OFFICE VISIT (OUTPATIENT)
Dept: CARDIOLOGY | Facility: CLINIC | Age: 64
End: 2019-12-10
Payer: COMMERCIAL

## 2019-12-10 VITALS
BODY MASS INDEX: 39.4 KG/M2 | HEART RATE: 65 BPM | WEIGHT: 230.81 LBS | SYSTOLIC BLOOD PRESSURE: 126 MMHG | DIASTOLIC BLOOD PRESSURE: 84 MMHG | OXYGEN SATURATION: 99 % | HEIGHT: 64 IN

## 2019-12-10 DIAGNOSIS — I42.0 CARDIOMYOPATHY, DILATED: ICD-10-CM

## 2019-12-10 DIAGNOSIS — G47.33 OBSTRUCTIVE SLEEP APNEA: ICD-10-CM

## 2019-12-10 DIAGNOSIS — I10 ESSENTIAL HYPERTENSION: ICD-10-CM

## 2019-12-10 DIAGNOSIS — Z01.810 PREOP CARDIOVASCULAR EXAM: Primary | ICD-10-CM

## 2019-12-10 DIAGNOSIS — I48.0 PAF (PAROXYSMAL ATRIAL FIBRILLATION): ICD-10-CM

## 2019-12-10 PROCEDURE — 99214 PR OFFICE/OUTPT VISIT, EST, LEVL IV, 30-39 MIN: ICD-10-PCS | Mod: S$GLB,,, | Performed by: NURSE PRACTITIONER

## 2019-12-10 PROCEDURE — 3008F BODY MASS INDEX DOCD: CPT | Mod: CPTII,S$GLB,, | Performed by: NURSE PRACTITIONER

## 2019-12-10 PROCEDURE — 99999 PR PBB SHADOW E&M-EST. PATIENT-LVL IV: ICD-10-PCS | Mod: PBBFAC,,, | Performed by: NURSE PRACTITIONER

## 2019-12-10 PROCEDURE — 3074F PR MOST RECENT SYSTOLIC BLOOD PRESSURE < 130 MM HG: ICD-10-PCS | Mod: CPTII,S$GLB,, | Performed by: NURSE PRACTITIONER

## 2019-12-10 PROCEDURE — 3074F SYST BP LT 130 MM HG: CPT | Mod: CPTII,S$GLB,, | Performed by: NURSE PRACTITIONER

## 2019-12-10 PROCEDURE — 3008F PR BODY MASS INDEX (BMI) DOCUMENTED: ICD-10-PCS | Mod: CPTII,S$GLB,, | Performed by: NURSE PRACTITIONER

## 2019-12-10 PROCEDURE — 99999 PR PBB SHADOW E&M-EST. PATIENT-LVL IV: CPT | Mod: PBBFAC,,, | Performed by: NURSE PRACTITIONER

## 2019-12-10 PROCEDURE — 3079F DIAST BP 80-89 MM HG: CPT | Mod: CPTII,S$GLB,, | Performed by: NURSE PRACTITIONER

## 2019-12-10 PROCEDURE — 3079F PR MOST RECENT DIASTOLIC BLOOD PRESSURE 80-89 MM HG: ICD-10-PCS | Mod: CPTII,S$GLB,, | Performed by: NURSE PRACTITIONER

## 2019-12-10 PROCEDURE — 99214 OFFICE O/P EST MOD 30 MIN: CPT | Mod: S$GLB,,, | Performed by: NURSE PRACTITIONER

## 2019-12-10 RX ORDER — NATEGLINIDE 120 MG/1
120 TABLET ORAL 3 TIMES DAILY
Refills: 1 | COMMUNITY
Start: 2019-10-03 | End: 2022-06-28

## 2019-12-10 NOTE — PROGRESS NOTES
Subjective:   Patient ID:  Keysha Dewey is a 64 y.o. female who presents for follow up of No chief complaint on file.      HPI  Ms. Dewey' current medical conditions include PAF, INDIRA, bipolar disorder, dilated CMPY, DM, HTN, hyperlipidemia, and statin intolerance. She has a history of gastric bypass 2005 and hysterectomy in 2002. Had no complications with anesthesia. Patient presents today seeking cardiac risk stratification for left knee replacement surgery with Dr. Leavitt. Underwent right knee replacement in August 2019 and did well.     Echo in 2018 revealed LVEF 35%. Underwent LHC in December 2018 to evaluate for CAD as cause of LV decline. LHC revealed normal coronary arteries. Started on medical therapy and repeat echo in January 2019 revealed recovered LVEF to 60%.     Denies any chest pain, SOB, COHN, orthopnea, PND, dizziness, palpitations,  near syncope, syncope or edema. Has no symptoms concerning for angina or equivalent. No CNS Complaints to suggest TIA or CVA. Does well with limiting sodium intake.  EKG done with PCP earlier this week reviewed and shows NSR with no acute ischemic changes.     Has no abnormal bleeding on Eliquis.   Uses a stationary bike at the gym 3 days a week and does PT twice weekly and has no angina symptoms.     Past Medical History:   Diagnosis Date    Atrial fibrillation     Bipolar disorder     Cardiomyopathy, dilated 12/14/2018    DM w/o complication type II     Essential hypertension 1/7/2019    Mixed hyperlipidemia 1/7/2019    Obesity hypoventilation syndrome 12/12/2018    Obstructive sleep apnea 8/9/2019    Statin intolerance 1/7/2019       Past Surgical History:   Procedure Laterality Date    CARDIAC CATHETERIZATION      GASTRIC BYPASS  2006    HYSTERECTOMY  2002    LEFT HEART CATHETERIZATION Left 12/14/2018    Procedure: CATHETERIZATION, HEART, LEFT;  Surgeon: Alexis Smith MD;  Location: [facility] CATH LAB;  Service: Cardiology;  Laterality: Left;        Social History     Tobacco Use    Smoking status: Never Smoker   Substance Use Topics    Alcohol use: No    Drug use: No       No family history on file.    Current Outpatient Medications   Medication Sig    amLODIPine (NORVASC) 2.5 MG tablet Take 1 tablet (2.5 mg total) by mouth once daily.    apixaban (ELIQUIS) 5 mg Tab Take 1 tablet (5 mg total) by mouth 2 (two) times daily.    aspirin (ECOTRIN) 81 MG EC tablet Take 1 tablet (81 mg total) by mouth once daily.    busPIRone (BUSPAR) 10 MG tablet Take 10 mg by mouth 2 (two) times daily.    carBAMazepine (TEGRETOL) 200 mg tablet Take 200 mg by mouth every evening. Takes one and one-half tablet at night    cyanocobalamin, vitamin B-12, (VITAMIN B-12) 2,500 mcg Subl Place 2,500 mcg under the tongue once daily.    exenatide (BYETTA) 5 mcg/dose (250 mcg/mL) 1.2 mL injection Inject 5 mcg into the skin 2 (two) times daily with meals.    ezetimibe (ZETIA) 10 mg tablet Take 1 tablet (10 mg total) by mouth once daily.    ipratropium (ATROVENT) 0.03 % nasal spray 2 sprays by Nasal route 3 (three) times daily as needed for Rhinitis.    lancets (TRUEPLUS LANCETS) 28 gauge Misc USE 1 EACH TWICE DAILY    metoprolol tartrate (LOPRESSOR) 25 MG tablet Take 0.5 tablets (12.5 mg total) by mouth 2 (two) times daily.    multivitamin Chew Take by mouth.    nateglinide (STARLIX) 60 MG tablet Take 60 mg by mouth 3 (three) times daily before meals.     SITagliptin (JANUVIA) 100 MG Tab Take 100 mg by mouth once daily.    timolol (BETIMOL) 0.5 % ophthalmic solution 1 drop 2 (two) times daily.    turmeric, bulk, 95 % Powd by Misc.(Non-Drug; Combo Route) route.    valsartan-hydrochlorothiazide (DIOVAN-HCT) 80-12.5 mg per tablet Take 1 tablet by mouth once daily.     No current facility-administered medications for this visit.      Current Outpatient Medications on File Prior to Visit   Medication Sig    amLODIPine (NORVASC) 2.5 MG tablet Take 1 tablet (2.5 mg total) by  mouth once daily.    apixaban (ELIQUIS) 5 mg Tab Take 1 tablet (5 mg total) by mouth 2 (two) times daily.    aspirin (ECOTRIN) 81 MG EC tablet Take 1 tablet (81 mg total) by mouth once daily.    busPIRone (BUSPAR) 10 MG tablet Take 10 mg by mouth 2 (two) times daily.    carBAMazepine (TEGRETOL) 200 mg tablet Take 200 mg by mouth every evening. Takes one and one-half tablet at night    cyanocobalamin, vitamin B-12, (VITAMIN B-12) 2,500 mcg Subl Place 2,500 mcg under the tongue once daily.    exenatide (BYETTA) 5 mcg/dose (250 mcg/mL) 1.2 mL injection Inject 5 mcg into the skin 2 (two) times daily with meals.    ezetimibe (ZETIA) 10 mg tablet Take 1 tablet (10 mg total) by mouth once daily.    ipratropium (ATROVENT) 0.03 % nasal spray 2 sprays by Nasal route 3 (three) times daily as needed for Rhinitis.    lancets (TRUEPLUS LANCETS) 28 gauge Misc USE 1 EACH TWICE DAILY    metoprolol tartrate (LOPRESSOR) 25 MG tablet Take 0.5 tablets (12.5 mg total) by mouth 2 (two) times daily.    multivitamin Chew Take by mouth.    nateglinide (STARLIX) 60 MG tablet Take 60 mg by mouth 3 (three) times daily before meals.     SITagliptin (JANUVIA) 100 MG Tab Take 100 mg by mouth once daily.    timolol (BETIMOL) 0.5 % ophthalmic solution 1 drop 2 (two) times daily.    turmeric, bulk, 95 % Powd by Misc.(Non-Drug; Combo Route) route.    valsartan-hydrochlorothiazide (DIOVAN-HCT) 80-12.5 mg per tablet Take 1 tablet by mouth once daily.     No current facility-administered medications on file prior to visit.        Review of Systems   Constitution: Negative for chills, decreased appetite, fever and malaise/fatigue.   HENT: Negative for congestion, hoarse voice and sore throat.    Eyes: Negative for blurred vision and discharge.   Cardiovascular: Negative for chest pain, claudication, cyanosis, dyspnea on exertion, irregular heartbeat, leg swelling, near-syncope, orthopnea, palpitations and paroxysmal nocturnal dyspnea.    Respiratory: Negative for cough, hemoptysis, shortness of breath, snoring, sputum production and wheezing.    Endocrine: Negative for cold intolerance and heat intolerance.   Hematologic/Lymphatic: Negative for bleeding problem. Does not bruise/bleed easily.   Skin: Negative for rash.   Musculoskeletal: Positive for arthritis and joint pain. Negative for back pain, joint swelling, muscle cramps, muscle weakness and myalgias.   Gastrointestinal: Negative for abdominal pain, constipation, diarrhea, heartburn, melena and nausea.   Genitourinary: Negative for hematuria.   Neurological: Negative for dizziness, focal weakness, headaches, light-headedness, loss of balance, numbness, paresthesias, seizures and weakness.   Psychiatric/Behavioral: Negative for memory loss. The patient does not have insomnia.    Allergic/Immunologic: Negative for hives.       Objective:   Physical Exam   Constitutional: She is oriented to person, place, and time. She appears well-developed and well-nourished. No distress.   HENT:   Head: Normocephalic and atraumatic.   Eyes: Pupils are equal, round, and reactive to light. Right eye exhibits no discharge. Left eye exhibits no discharge.   Neck: Neck supple. No JVD present.   Cardiovascular: Normal rate, regular rhythm, S1 normal, S2 normal and normal heart sounds.   No murmur heard.  Pulmonary/Chest: Effort normal and breath sounds normal. No respiratory distress. She has no wheezes. She has no rales.   Abdominal: Soft. She exhibits no distension.   Musculoskeletal: She exhibits no edema.   Neurological: She is alert and oriented to person, place, and time.   Skin: Skin is warm and dry. She is not diaphoretic. No erythema.   Psychiatric: She has a normal mood and affect. Her behavior is normal. Thought content normal.   Nursing note and vitals reviewed.    There were no vitals filed for this visit.  Lab Results   Component Value Date    CHOL 165 07/25/2019    CHOL 175 04/04/2019    CHOL 173  12/13/2018     Lab Results   Component Value Date    HDL 46 07/25/2019    HDL 48 04/04/2019    HDL 42 12/13/2018     Lab Results   Component Value Date    LDLCALC 95.2 07/25/2019    LDLCALC 101.4 04/04/2019    LDLCALC 100.4 12/13/2018     Lab Results   Component Value Date    TRIG 119 07/25/2019    TRIG 128 04/04/2019    TRIG 153 (H) 12/13/2018     Lab Results   Component Value Date    CHOLHDL 27.9 07/25/2019    CHOLHDL 27.4 04/04/2019    CHOLHDL 24.3 12/13/2018       Chemistry        Component Value Date/Time     07/25/2019 0801    K 4.1 07/25/2019 0801     07/25/2019 0801    CO2 25 07/25/2019 0801    BUN 19 07/25/2019 0801    CREATININE 1.0 07/25/2019 0801     (H) 07/25/2019 0801        Component Value Date/Time    CALCIUM 9.6 07/25/2019 0801    ALKPHOS 93 07/25/2019 0801    AST 17 07/25/2019 0801    ALT 18 07/25/2019 0801    BILITOT 0.3 07/25/2019 0801    ESTGFRAFRICA >60.0 07/25/2019 0801    EGFRNONAA >60.0 07/25/2019 0801          No results found for: TSH  Lab Results   Component Value Date    INR 1.0 12/13/2018    INR 1.0 08/17/2016     Lab Results   Component Value Date    WBC 10.71 12/14/2018    HGB 11.9 (L) 12/14/2018    HCT 37.1 12/14/2018    MCV 78 (L) 12/14/2018     12/14/2018     BMP  Sodium   Date Value Ref Range Status   07/25/2019 139 136 - 145 mmol/L Final     Potassium   Date Value Ref Range Status   07/25/2019 4.1 3.5 - 5.1 mmol/L Final     Chloride   Date Value Ref Range Status   07/25/2019 106 95 - 110 mmol/L Final     CO2   Date Value Ref Range Status   07/25/2019 25 23 - 29 mmol/L Final     BUN, Bld   Date Value Ref Range Status   07/25/2019 19 8 - 23 mg/dL Final     Creatinine   Date Value Ref Range Status   07/25/2019 1.0 0.5 - 1.4 mg/dL Final     Calcium   Date Value Ref Range Status   07/25/2019 9.6 8.7 - 10.5 mg/dL Final     Anion Gap   Date Value Ref Range Status   07/25/2019 8 8 - 16 mmol/L Final     eGFR if    Date Value Ref Range Status    07/25/2019 >60.0 >60 mL/min/1.73 m^2 Final     eGFR if non    Date Value Ref Range Status   07/25/2019 >60.0 >60 mL/min/1.73 m^2 Final     Comment:     Calculation used to obtain the estimated glomerular filtration  rate (eGFR) is the CKD-EPI equation.        CrCl cannot be calculated (Patient's most recent lab result is older than the maximum 7 days allowed.).    Assessment:     1. Preop cardiovascular exam    2. Cardiomyopathy, dilated    3. Essential hypertension    4. PAF (paroxysmal atrial fibrillation)    5. Obstructive sleep apnea        Plan:   Preop cardiovascular exam  Patient cleared to proceed with right knee replacement surgery at low to moderate risk for CV event. Clear to hold Eliquis for 48 hours prior and will need to resume after      Cardiomyopathy, dilated  Recovered LVF to 60% on echo in January 2019  Continue Diovan, metoprolol   Heart healthy diet  Limit fluid intake 50-60 oz   Daily weights and to notify clinic if weight increases by more than 3 lbs in 1 day or 5 lbs in 1 week.   Exercise routine as tolerated    Hypertension   Continue amlodipine, Diovan-HCTZ and metoprolol      PAF (paroxysmal atrial fibrillation)  Continue Metoprolol and Eliquis      Obstructive sleep apnea  Needs to resume her CPAP use      RTC in 6 months or sooner if needed

## 2019-12-28 DIAGNOSIS — I10 ESSENTIAL HYPERTENSION: ICD-10-CM

## 2019-12-28 DIAGNOSIS — Z78.9 STATIN INTOLERANCE: ICD-10-CM

## 2019-12-28 DIAGNOSIS — I48.0 PAF (PAROXYSMAL ATRIAL FIBRILLATION): ICD-10-CM

## 2019-12-29 RX ORDER — AMLODIPINE BESYLATE 2.5 MG/1
TABLET ORAL
Qty: 90 TABLET | Refills: 3 | Status: SHIPPED | OUTPATIENT
Start: 2019-12-29 | End: 2020-12-10 | Stop reason: SDUPTHER

## 2019-12-29 RX ORDER — METOPROLOL TARTRATE 25 MG/1
TABLET, FILM COATED ORAL
Qty: 90 TABLET | Refills: 3 | Status: SHIPPED | OUTPATIENT
Start: 2019-12-29 | End: 2020-12-10 | Stop reason: SDUPTHER

## 2020-01-04 DIAGNOSIS — I48.0 PAF (PAROXYSMAL ATRIAL FIBRILLATION): ICD-10-CM

## 2020-01-06 RX ORDER — APIXABAN 5 MG/1
TABLET, FILM COATED ORAL
Qty: 180 TABLET | Refills: 3 | Status: SHIPPED | OUTPATIENT
Start: 2020-01-06 | End: 2021-01-04 | Stop reason: SDUPTHER

## 2020-02-24 ENCOUNTER — TELEPHONE (OUTPATIENT)
Dept: CARDIOLOGY | Facility: CLINIC | Age: 65
End: 2020-02-24

## 2020-06-08 DIAGNOSIS — I10 ESSENTIAL HYPERTENSION: Primary | ICD-10-CM

## 2020-06-10 ENCOUNTER — HOSPITAL ENCOUNTER (OUTPATIENT)
Dept: CARDIOLOGY | Facility: HOSPITAL | Age: 65
Discharge: HOME | End: 2020-06-10
Attending: INTERNAL MEDICINE
Payer: COMMERCIAL

## 2020-06-10 ENCOUNTER — OFFICE VISIT (OUTPATIENT)
Dept: CARDIOLOGY | Facility: CLINIC | Age: 65
End: 2020-06-10
Payer: COMMERCIAL

## 2020-06-10 ENCOUNTER — OFFICE VISIT (OUTPATIENT)
Dept: SLEEP MEDICINE | Facility: CLINIC | Age: 65
End: 2020-06-10
Payer: COMMERCIAL

## 2020-06-10 VITALS
WEIGHT: 239 LBS | OXYGEN SATURATION: 95 % | HEART RATE: 88 BPM | BODY MASS INDEX: 40.8 KG/M2 | DIASTOLIC BLOOD PRESSURE: 74 MMHG | SYSTOLIC BLOOD PRESSURE: 116 MMHG | RESPIRATION RATE: 16 BRPM | HEIGHT: 64 IN

## 2020-06-10 VITALS
OXYGEN SATURATION: 99 % | DIASTOLIC BLOOD PRESSURE: 74 MMHG | HEART RATE: 78 BPM | BODY MASS INDEX: 40.8 KG/M2 | HEIGHT: 64 IN | SYSTOLIC BLOOD PRESSURE: 122 MMHG | WEIGHT: 239 LBS

## 2020-06-10 DIAGNOSIS — I10 ESSENTIAL HYPERTENSION: ICD-10-CM

## 2020-06-10 DIAGNOSIS — I48.0 PAF (PAROXYSMAL ATRIAL FIBRILLATION): ICD-10-CM

## 2020-06-10 DIAGNOSIS — G47.33 OSA (OBSTRUCTIVE SLEEP APNEA): Primary | ICD-10-CM

## 2020-06-10 DIAGNOSIS — I48.0 PAF (PAROXYSMAL ATRIAL FIBRILLATION): Primary | ICD-10-CM

## 2020-06-10 DIAGNOSIS — I42.0 CARDIOMYOPATHY, DILATED: ICD-10-CM

## 2020-06-10 DIAGNOSIS — E66.01 MORBID OBESITY WITH BMI OF 40.0-44.9, ADULT: ICD-10-CM

## 2020-06-10 PROBLEM — Z01.810 PREOP CARDIOVASCULAR EXAM: Status: RESOLVED | Noted: 2019-07-17 | Resolved: 2020-06-10

## 2020-06-10 PROBLEM — R79.89 ELEVATED TROPONIN: Status: RESOLVED | Noted: 2018-12-12 | Resolved: 2020-06-10

## 2020-06-10 PROCEDURE — 99999 PR PBB SHADOW E&M-EST. PATIENT-LVL IV: CPT | Mod: PBBFAC,,, | Performed by: NURSE PRACTITIONER

## 2020-06-10 PROCEDURE — 3008F PR BODY MASS INDEX (BMI) DOCUMENTED: ICD-10-PCS | Mod: CPTII,S$GLB,, | Performed by: NURSE PRACTITIONER

## 2020-06-10 PROCEDURE — 99214 PR OFFICE/OUTPT VISIT, EST, LEVL IV, 30-39 MIN: ICD-10-PCS | Mod: S$GLB,,, | Performed by: NURSE PRACTITIONER

## 2020-06-10 PROCEDURE — 3074F PR MOST RECENT SYSTOLIC BLOOD PRESSURE < 130 MM HG: ICD-10-PCS | Mod: CPTII,S$GLB,, | Performed by: NURSE PRACTITIONER

## 2020-06-10 PROCEDURE — 99214 OFFICE O/P EST MOD 30 MIN: CPT | Mod: S$GLB,,, | Performed by: NURSE PRACTITIONER

## 2020-06-10 PROCEDURE — 99214 PR OFFICE/OUTPT VISIT, EST, LEVL IV, 30-39 MIN: ICD-10-PCS | Mod: 25,S$GLB,, | Performed by: NURSE PRACTITIONER

## 2020-06-10 PROCEDURE — 99214 OFFICE O/P EST MOD 30 MIN: CPT | Mod: 25,S$GLB,, | Performed by: NURSE PRACTITIONER

## 2020-06-10 PROCEDURE — 3078F DIAST BP <80 MM HG: CPT | Mod: CPTII,S$GLB,, | Performed by: NURSE PRACTITIONER

## 2020-06-10 PROCEDURE — 3074F SYST BP LT 130 MM HG: CPT | Mod: CPTII,S$GLB,, | Performed by: NURSE PRACTITIONER

## 2020-06-10 PROCEDURE — 3078F PR MOST RECENT DIASTOLIC BLOOD PRESSURE < 80 MM HG: ICD-10-PCS | Mod: CPTII,S$GLB,, | Performed by: NURSE PRACTITIONER

## 2020-06-10 PROCEDURE — 3008F BODY MASS INDEX DOCD: CPT | Mod: CPTII,S$GLB,, | Performed by: NURSE PRACTITIONER

## 2020-06-10 PROCEDURE — 99999 PR PBB SHADOW E&M-EST. PATIENT-LVL IV: ICD-10-PCS | Mod: PBBFAC,,, | Performed by: NURSE PRACTITIONER

## 2020-06-10 PROCEDURE — 93005 ELECTROCARDIOGRAM TRACING: CPT

## 2020-06-10 PROCEDURE — 93010 EKG 12-LEAD: ICD-10-PCS | Mod: ,,, | Performed by: INTERNAL MEDICINE

## 2020-06-10 PROCEDURE — 93010 ELECTROCARDIOGRAM REPORT: CPT | Mod: ,,, | Performed by: INTERNAL MEDICINE

## 2020-06-10 NOTE — PROGRESS NOTES
"Subjective:      Patient ID: Keysha Dewey is a 64 y.o. female.    Chief Complaint: Sleep Apnea    HPI  Past referral for INDIRA with dx of A fib and cardimyopathy.   Patient has observed snoring, feels sleepy after a productive day.  She denies morning headache.    She denies recent weight gain.  Cardiovascular risk factors: diabetes, hypertension, hyperlipidemia, obesity, cardiomyopathy, atrial fib.   She had a HST with moderate sleep apnea. Autopap was ordered. [place] and had 2 surgeries so she postponed getting the machine. She is ready to start therapy.       Patient Active Problem List   Diagnosis    Obesity hypoventilation syndrome    Type 2 diabetes mellitus, without long-term current use of insulin    Bipolar disorder    Cardiomyopathy, dilated    Abnormal echocardiogram    PAF (paroxysmal atrial fibrillation)    Essential hypertension    Mixed hyperlipidemia    Statin intolerance    Mild concentric left ventricular hypertrophy (LVH)    Obstructive sleep apnea       /74   Pulse 88   Resp 16   Ht 5' 4" (1.626 m)   Wt 108.4 kg (238 lb 15.7 oz)   SpO2 95%   BMI 41.02 kg/m²   Body mass index is 41.02 kg/m².    Review of Systems   Respiratory: Positive for snoring.    Musculoskeletal: Positive for arthralgias.   Psychiatric/Behavioral: Positive for sleep disturbance.   All other systems reviewed and are negative.    Objective:      Physical Exam   Constitutional: She is oriented to person, place, and time. She appears well-developed and well-nourished.   obese   HENT:   Head: Normocephalic and atraumatic.   Neck: Normal range of motion. Neck supple.   Cardiovascular: Normal rate and regular rhythm.   Pulmonary/Chest: Effort normal and breath sounds normal.   Abdominal: Soft.   Musculoskeletal: Normal range of motion. She exhibits no edema.   Neurological: She is alert and oriented to person, place, and time.   Skin: Skin is warm and dry.   Psychiatric: She has a normal " mood and affect.     Personal Diagnostic Review  Review of HST- moderate INDIRA    Assessment:       1. INDIRA (obstructive sleep apnea)    2. Morbid obesity with BMI of 40.0-44.9, adult    3. PAF (paroxysmal atrial fibrillation)        Outpatient Encounter Medications as of 6/10/2020   Medication Sig Dispense Refill    amLODIPine (NORVASC) 2.5 MG tablet TAKE 1 TABLET(2.5 MG) BY MOUTH EVERY DAY 90 tablet 3    busPIRone (BUSPAR) 10 MG tablet Take 10 mg by mouth 2 (two) times daily.      carBAMazepine (TEGRETOL) 200 mg tablet Take 200 mg by mouth every evening. Takes one and one-half tablet at night      cyanocobalamin, vitamin B-12, (VITAMIN B-12) 2,500 mcg Subl Place 2,500 mcg under the tongue once daily.      ELIQUIS 5 mg Tab TAKE 1 TABLET(5 MG) BY MOUTH TWICE DAILY 180 tablet 3    exenatide (BYETTA) 5 mcg/dose (250 mcg/mL) 1.2 mL injection Inject 5 mcg into the skin 2 (two) times daily with meals.      ezetimibe (ZETIA) 10 mg tablet Take 1 tablet (10 mg total) by mouth once daily. 90 tablet 3    ipratropium (ATROVENT) 0.03 % nasal spray 2 sprays by Nasal route 3 (three) times daily as needed for Rhinitis. 30 mL 0    lancets (TRUEPLUS LANCETS) 28 gauge Misc USE 1 EACH TWICE DAILY      metoprolol tartrate (LOPRESSOR) 25 MG tablet TAKE 1/2 TABLET(12.5 MG) BY MOUTH TWICE DAILY 90 tablet 3    multivitamin Chew Take by mouth.      nateglinide (STARLIX) 120 MG tablet Take 120 mg by mouth 3 (three) times daily.   1    SITagliptin (JANUVIA) 100 MG Tab Take 100 mg by mouth once daily.       timolol (BETIMOL) 0.5 % ophthalmic solution 1 drop 2 (two) times daily.      turmeric, bulk, 95 % Powd by Misc.(Non-Drug; Combo Route) route.      valsartan-hydrochlorothiazide (DIOVAN-HCT) 80-12.5 mg per tablet Take 1 tablet by mouth once daily. 90 tablet 3     No facility-administered encounter medications on file as of 6/10/2020.      No orders of the defined types were placed in this encounter.    Plan:     AutoPap set up  and follow up 10 weeks to review therapy  Weight loss and exercise to improve overall health.    Problem List Items Addressed This Visit        Cardiac/Vascular    PAF (paroxysmal atrial fibrillation)      Other Visit Diagnoses     INDIRA (obstructive sleep apnea)    -  Primary    Morbid obesity with BMI of 40.0-44.9, adult

## 2020-06-10 NOTE — PROGRESS NOTES
Subjective:   Patient ID:  Keysha Dewey is a 64 y.o. female who presents for follow up of Atrial Fibrillation; Hypertension; and Hyperlipidemia      HPI  Ms. Dewey' current medical conditions include PAF (on Eliquis), INDIRA not wearing CPAP, bipolar disorder, dilated CMPY, DM, HTN, hyperlipidemia, and statin intolerance. She has a history of gastric bypass 2005 and hysterectomy in 2002. Had knee replacement in Aug 2019 and Dec 2019.     Echo in 2018 revealed LVEF 35%. Underwent LHC in December 2018 to evaluate for CAD as cause of LV decline. LHC revealed normal coronary arteries. Started on medical therapy and repeat echo in January 2019 revealed recovered LVEF to 60%.      Denies any chest pain, SOB, COHN, orthopnea, PND, dizziness, palpitations,  near syncope, syncope or edema. Has no symptoms concerning for angina or equivalent. No CNS Complaints to suggest TIA or CVA. Does well with limiting sodium intake.  EKG today shows NSR.   Still riding stationary bike daily for 30 min     Past Medical History:   Diagnosis Date    Atrial fibrillation     Bipolar disorder     Cardiomyopathy, dilated 12/14/2018    DM w/o complication type II     Essential hypertension 1/7/2019    Mixed hyperlipidemia 1/7/2019    Obesity hypoventilation syndrome 12/12/2018    Obstructive sleep apnea 8/9/2019    Statin intolerance 1/7/2019       Past Surgical History:   Procedure Laterality Date    CARDIAC CATHETERIZATION      GASTRIC BYPASS  2006    HYSTERECTOMY  2002    LEFT HEART CATHETERIZATION Left 12/14/2018    Procedure: CATHETERIZATION, HEART, LEFT;  Surgeon: Alexis Smith MD;  Location: [facility] CATH LAB;  Service: Cardiology;  Laterality: Left;    TOTAL KNEE ARTHROPLASTY Right 08/22/2019       Social History     Tobacco Use    Smoking status: Never Smoker   Substance Use Topics    Alcohol use: No    Drug use: No       History reviewed. No pertinent family history.    Current Outpatient Medications   Medication Sig     amLODIPine (NORVASC) 2.5 MG tablet TAKE 1 TABLET(2.5 MG) BY MOUTH EVERY DAY    busPIRone (BUSPAR) 10 MG tablet Take 10 mg by mouth 2 (two) times daily.    carBAMazepine (TEGRETOL) 200 mg tablet Take 200 mg by mouth every evening. Takes one and one-half tablet at night    cyanocobalamin, vitamin B-12, (VITAMIN B-12) 2,500 mcg Subl Place 2,500 mcg under the tongue once daily.    ELIQUIS 5 mg Tab TAKE 1 TABLET(5 MG) BY MOUTH TWICE DAILY    exenatide (BYETTA) 5 mcg/dose (250 mcg/mL) 1.2 mL injection Inject 5 mcg into the skin 2 (two) times daily with meals.    ezetimibe (ZETIA) 10 mg tablet Take 1 tablet (10 mg total) by mouth once daily.    ipratropium (ATROVENT) 0.03 % nasal spray 2 sprays by Nasal route 3 (three) times daily as needed for Rhinitis.    metoprolol tartrate (LOPRESSOR) 25 MG tablet TAKE 1/2 TABLET(12.5 MG) BY MOUTH TWICE DAILY    multivitamin Chew Take by mouth.    nateglinide (STARLIX) 120 MG tablet Take 120 mg by mouth 3 (three) times daily.     SITagliptin (JANUVIA) 100 MG Tab Take 100 mg by mouth once daily.     timolol (BETIMOL) 0.5 % ophthalmic solution 1 drop 2 (two) times daily.    turmeric, bulk, 95 % Powd by Misc.(Non-Drug; Combo Route) route.    valsartan-hydrochlorothiazide (DIOVAN-HCT) 80-12.5 mg per tablet Take 1 tablet by mouth once daily.    lancets (TRUEPLUS LANCETS) 28 gauge Misc USE 1 EACH TWICE DAILY     No current facility-administered medications for this visit.      Current Outpatient Medications on File Prior to Visit   Medication Sig    amLODIPine (NORVASC) 2.5 MG tablet TAKE 1 TABLET(2.5 MG) BY MOUTH EVERY DAY    busPIRone (BUSPAR) 10 MG tablet Take 10 mg by mouth 2 (two) times daily.    carBAMazepine (TEGRETOL) 200 mg tablet Take 200 mg by mouth every evening. Takes one and one-half tablet at night    cyanocobalamin, vitamin B-12, (VITAMIN B-12) 2,500 mcg Subl Place 2,500 mcg under the tongue once daily.    ELIQUIS 5 mg Tab TAKE 1 TABLET(5 MG) BY MOUTH  TWICE DAILY    exenatide (BYETTA) 5 mcg/dose (250 mcg/mL) 1.2 mL injection Inject 5 mcg into the skin 2 (two) times daily with meals.    ezetimibe (ZETIA) 10 mg tablet Take 1 tablet (10 mg total) by mouth once daily.    ipratropium (ATROVENT) 0.03 % nasal spray 2 sprays by Nasal route 3 (three) times daily as needed for Rhinitis.    metoprolol tartrate (LOPRESSOR) 25 MG tablet TAKE 1/2 TABLET(12.5 MG) BY MOUTH TWICE DAILY    multivitamin Chew Take by mouth.    nateglinide (STARLIX) 120 MG tablet Take 120 mg by mouth 3 (three) times daily.     SITagliptin (JANUVIA) 100 MG Tab Take 100 mg by mouth once daily.     timolol (BETIMOL) 0.5 % ophthalmic solution 1 drop 2 (two) times daily.    turmeric, bulk, 95 % Powd by Misc.(Non-Drug; Combo Route) route.    valsartan-hydrochlorothiazide (DIOVAN-HCT) 80-12.5 mg per tablet Take 1 tablet by mouth once daily.    lancets (TRUEPLUS LANCETS) 28 gauge Misc USE 1 EACH TWICE DAILY     No current facility-administered medications on file prior to visit.        Review of Systems   Constitution: Negative for diaphoresis, malaise/fatigue, weight gain and weight loss.   HENT: Negative for congestion and nosebleeds.    Cardiovascular: Negative for chest pain, claudication, cyanosis, dyspnea on exertion, irregular heartbeat, leg swelling, near-syncope, orthopnea, palpitations, paroxysmal nocturnal dyspnea and syncope.   Respiratory: Negative for cough, hemoptysis, shortness of breath, sleep disturbances due to breathing, snoring, sputum production and wheezing.         INDIRA. Uses CPAP    Hematologic/Lymphatic: Negative for bleeding problem. Does not bruise/bleed easily.   Skin: Negative for rash.   Musculoskeletal: Negative for arthritis, back pain, falls, joint pain, muscle cramps and muscle weakness.   Gastrointestinal: Negative for abdominal pain, constipation, diarrhea, heartburn, hematemesis, hematochezia, melena, nausea and vomiting.   Genitourinary: Negative for dysuria,  "hematuria and nocturia.   Neurological: Negative for excessive daytime sleepiness, dizziness, headaches, light-headedness, loss of balance, numbness, vertigo and weakness.       Objective:   Physical Exam   Constitutional: She is oriented to person, place, and time. She appears well-developed and well-nourished.   Neck: Neck supple. No JVD present.   Cardiovascular: Normal rate, regular rhythm, normal heart sounds and normal pulses. Exam reveals no friction rub.   No murmur heard.  Pulmonary/Chest: Effort normal and breath sounds normal. No respiratory distress. She has no wheezes. She has no rales.   Abdominal: Soft. Bowel sounds are normal. She exhibits no distension.   Musculoskeletal: She exhibits no edema or tenderness.   Neurological: She is alert and oriented to person, place, and time.   Skin: Skin is warm and dry. No rash noted.   Psychiatric: She has a normal mood and affect. Her behavior is normal.   Nursing note and vitals reviewed.    Vitals:    06/10/20 1131   BP: 122/74   BP Location: Left arm   Patient Position: Sitting   BP Method: Large (Manual)   Pulse: 78   SpO2: 99%   Weight: 108.4 kg (238 lb 15.7 oz)   Height: 5' 4" (1.626 m)     Lab Results   Component Value Date    CHOL 165 07/25/2019    CHOL 175 04/04/2019    CHOL 173 12/13/2018     Lab Results   Component Value Date    HDL 46 07/25/2019    HDL 48 04/04/2019    HDL 42 12/13/2018     Lab Results   Component Value Date    LDLCALC 95.2 07/25/2019    LDLCALC 101.4 04/04/2019    LDLCALC 100.4 12/13/2018     Lab Results   Component Value Date    TRIG 119 07/25/2019    TRIG 128 04/04/2019    TRIG 153 (H) 12/13/2018     Lab Results   Component Value Date    CHOLHDL 27.9 07/25/2019    CHOLHDL 27.4 04/04/2019    CHOLHDL 24.3 12/13/2018       Chemistry        Component Value Date/Time     07/25/2019 0801    K 4.1 07/25/2019 0801     07/25/2019 0801    CO2 25 07/25/2019 0801    BUN 19 07/25/2019 0801    CREATININE 1.0 07/25/2019 0801    GLU " 160 (H) 07/25/2019 0801        Component Value Date/Time    CALCIUM 9.6 07/25/2019 0801    ALKPHOS 93 07/25/2019 0801    AST 17 07/25/2019 0801    ALT 18 07/25/2019 0801    BILITOT 0.3 07/25/2019 0801    ESTGFRAFRICA >60.0 07/25/2019 0801    EGFRNONAA >60.0 07/25/2019 0801          No results found for: TSH  Lab Results   Component Value Date    INR 1.0 12/13/2018    INR 1.0 08/17/2016     Lab Results   Component Value Date    WBC 10.71 12/14/2018    HGB 11.9 (L) 12/14/2018    HCT 37.1 12/14/2018    MCV 78 (L) 12/14/2018     12/14/2018     BMP  Sodium   Date Value Ref Range Status   07/25/2019 139 136 - 145 mmol/L Final     Potassium   Date Value Ref Range Status   07/25/2019 4.1 3.5 - 5.1 mmol/L Final     Chloride   Date Value Ref Range Status   07/25/2019 106 95 - 110 mmol/L Final     CO2   Date Value Ref Range Status   07/25/2019 25 23 - 29 mmol/L Final     BUN, Bld   Date Value Ref Range Status   07/25/2019 19 8 - 23 mg/dL Final     Creatinine   Date Value Ref Range Status   07/25/2019 1.0 0.5 - 1.4 mg/dL Final     Calcium   Date Value Ref Range Status   07/25/2019 9.6 8.7 - 10.5 mg/dL Final     Anion Gap   Date Value Ref Range Status   07/25/2019 8 8 - 16 mmol/L Final     eGFR if    Date Value Ref Range Status   07/25/2019 >60.0 >60 mL/min/1.73 m^2 Final     eGFR if non    Date Value Ref Range Status   07/25/2019 >60.0 >60 mL/min/1.73 m^2 Final     Comment:     Calculation used to obtain the estimated glomerular filtration  rate (eGFR) is the CKD-EPI equation.        CrCl cannot be calculated (Patient's most recent lab result is older than the maximum 7 days allowed.).    Assessment:     1. PAF (paroxysmal atrial fibrillation)    2. Essential hypertension    3. Cardiomyopathy, dilated        Plan:   PAF (paroxysmal atrial fibrillation)  Continue Eliquis for CVA prophylaxis.  Continue metoprolol   CPAP compliance encouraged     Essential hypertension  Continue metoprolol,  amlodipine and Diovan/HCTZ  Heart healthy diet     Cardiomyopathy, dilated  Heart healthy diet  Limit fluid intake 50-60 oz   Daily weights and to notify clinic if weight increases by more than 3 lbs in 1 day or 5 lbs in 1 week.   Exercise routine as tolerated    RTC in 6 months

## 2020-08-10 ENCOUNTER — OFFICE VISIT (OUTPATIENT)
Dept: PULMONOLOGY | Facility: CLINIC | Age: 65
End: 2020-08-10
Payer: MEDICARE

## 2020-08-10 VITALS
WEIGHT: 244.81 LBS | HEART RATE: 68 BPM | DIASTOLIC BLOOD PRESSURE: 76 MMHG | SYSTOLIC BLOOD PRESSURE: 132 MMHG | OXYGEN SATURATION: 97 % | HEIGHT: 64 IN | BODY MASS INDEX: 41.79 KG/M2 | RESPIRATION RATE: 18 BRPM

## 2020-08-10 DIAGNOSIS — G47.33 OSA ON CPAP: Primary | Chronic | ICD-10-CM

## 2020-08-10 DIAGNOSIS — I51.7 MILD CONCENTRIC LEFT VENTRICULAR HYPERTROPHY (LVH): ICD-10-CM

## 2020-08-10 DIAGNOSIS — I10 ESSENTIAL HYPERTENSION: ICD-10-CM

## 2020-08-10 DIAGNOSIS — E66.2 OBESITY HYPOVENTILATION SYNDROME: Chronic | ICD-10-CM

## 2020-08-10 DIAGNOSIS — I48.0 PAF (PAROXYSMAL ATRIAL FIBRILLATION): ICD-10-CM

## 2020-08-10 DIAGNOSIS — I42.0 CARDIOMYOPATHY, DILATED: ICD-10-CM

## 2020-08-10 PROCEDURE — 99999 PR PBB SHADOW E&M-EST. PATIENT-LVL IV: ICD-10-PCS | Mod: PBBFAC,,, | Performed by: NURSE PRACTITIONER

## 2020-08-10 PROCEDURE — 99214 OFFICE O/P EST MOD 30 MIN: CPT | Mod: S$PBB,,, | Performed by: NURSE PRACTITIONER

## 2020-08-10 PROCEDURE — 99999 PR PBB SHADOW E&M-EST. PATIENT-LVL IV: CPT | Mod: PBBFAC,,, | Performed by: NURSE PRACTITIONER

## 2020-08-10 PROCEDURE — 99214 OFFICE O/P EST MOD 30 MIN: CPT | Mod: PBBFAC | Performed by: NURSE PRACTITIONER

## 2020-08-10 PROCEDURE — 99214 PR OFFICE/OUTPT VISIT, EST, LEVL IV, 30-39 MIN: ICD-10-PCS | Mod: S$PBB,,, | Performed by: NURSE PRACTITIONER

## 2020-08-10 RX ORDER — TIMOLOL MALEATE 5 MG/ML
SOLUTION/ DROPS OPHTHALMIC
COMMUNITY
Start: 2020-06-11

## 2020-08-10 RX ORDER — PEN NEEDLE, DIABETIC 31 GX5/16"
NEEDLE, DISPOSABLE MISCELLANEOUS
COMMUNITY
Start: 2020-08-03

## 2020-08-10 NOTE — PROGRESS NOTES
Subjective:      Patient ID: Keysha Dewey is a 64 y.o. female.    Chief Complaint: Sleep Apnea    HPI: Keysha Dewey presents to clinic for follow up for INDIRA with initial CPAP complaince assessment.  She was diagnosis with obstructive sleep apnea home sleep study 7/31/2019 and 8/1/2019.  No data on the 1st night of study.  Second night data revealed moderate obstructive sleep apnea with AHI 16.0 events an hour.  She was traveling and both knees replaced TKA's with separate surgeries at  orthopedics clinic with Dr. Joe Foss , so did not begin auto CPAP when ordered August 2019.   She saw Helga in June 2020 with orders to begin Auto CPAP.   She is on Auto CPAP of 5-20 cmH2O pressure which is optimally controlling sleep apnea with apneic index (AHI) 4.8 events an hour.   She is compliant with CPAP use. Complaince download today reveals 86.7% of days with greater than 4 hours of device use.   Patient reports benefit from CPAP use.  Patient reports complaint of would like more air at start up, request remote change to cpap 8 cm, pressure 90% of time. Nasal mask is tolerated.   [place] 5    Home sleep study 7/31/2019 and 8/1/2019.  No data on the 1st night of study.  Second night data revealed moderate obstructive sleep apnea with AHI 16.0 events an hour.    Other history:  PAF (on Eliquis), bipolar disorder, dilated CMPY, DM, HTN, hyperlipidemia, and statin intolerance. She has a history of gastric bypass 2005 and hysterectomy in 2002. Had knee replacement in Aug 2019 and Dec 2019.   Echo in 2018 revealed LVEF 35%. Underwent LHC in December 2018 to evaluate for CAD as cause of LV decline. LHC revealed normal coronary arteries. Started on medical therapy and repeat echo in January 2019 revealed recovered LVEF to 60%.     Previous Report Reviewed: lab reports and office notes     Past Medical History: The following portions of the patient's history were reviewed and updated as appropriate:   She  has a past  surgical history that includes Gastric bypass (2006); Hysterectomy (2002); Cardiac catheterization; Left heart catheterization (Left, 12/14/2018); and Total knee arthroplasty (Right, 08/22/2019).  Her family history is not on file.  She  reports that she has never smoked. She does not have any smokeless tobacco history on file. She reports that she does not drink alcohol or use drugs.  She has a current medication list which includes the following prescription(s): amlodipine, bd ultra-fine short pen needle, buspirone, carbamazepine, cyanocobalamin (vitamin b-12), eliquis, exenatide, ezetimibe, ipratropium, lancets, metoprolol tartrate, multivitamin, nateglinide, sitagliptin, timolol, timolol maleate 0.5%, turmeric (bulk), and valsartan-hydrochlorothiazide.  She is allergic to lipitor [atorvastatin]..    The following portions of the patient's history were reviewed and updated as appropriate: allergies, current medications, past family history, past medical history, past social history, past surgical history and problem list.    Review of Systems   Constitutional: Negative for fever, chills, weight loss, weight gain, activity change, appetite change, fatigue and night sweats.   HENT: Negative for postnasal drip, rhinorrhea, sinus pressure, voice change and congestion.    Eyes: Negative for redness and itching.   Respiratory: Negative for snoring, cough, sputum production, chest tightness, shortness of breath, wheezing, orthopnea, asthma nighttime symptoms, dyspnea on extertion, use of rescue inhaler and somnolence.    Cardiovascular: Negative.  Negative for chest pain, palpitations and leg swelling.   Genitourinary: Negative for difficulty urinating and hematuria.   Endocrine: Negative for cold intolerance and heat intolerance.    Musculoskeletal: Negative for arthralgias, gait problem, joint swelling and myalgias.   Skin: Negative.    Gastrointestinal: Negative for nausea, vomiting, abdominal pain and acid reflux.  "  Neurological: Negative for dizziness, weakness, light-headedness and headaches.   Hematological: Negative for adenopathy. No excessive bruising.   All other systems reviewed and are negative.     Objective:   /76   Pulse 68   Resp 18   Ht 5' 4" (1.626 m)   Wt 111 kg (244 lb 13.1 oz)   SpO2 97%   BMI 42.02 kg/m²   Physical Exam  Vitals signs reviewed.   Constitutional:       General: She is not in acute distress.     Appearance: She is well-developed. She is not ill-appearing or toxic-appearing.   HENT:      Head: Normocephalic.      Right Ear: External ear normal.      Left Ear: External ear normal.      Nose: Nose normal.      Mouth/Throat:      Pharynx: No oropharyngeal exudate.   Eyes:      Conjunctiva/sclera: Conjunctivae normal.   Neck:      Musculoskeletal: Normal range of motion and neck supple.   Cardiovascular:      Rate and Rhythm: Normal rate and regular rhythm.      Heart sounds: Normal heart sounds.   Pulmonary:      Effort: Pulmonary effort is normal.      Breath sounds: Normal breath sounds. No stridor.   Abdominal:      Palpations: Abdomen is soft.   Musculoskeletal: Normal range of motion.   Lymphadenopathy:      Cervical: No cervical adenopathy.   Skin:     General: Skin is warm and dry.   Neurological:      Mental Status: She is alert and oriented to person, place, and time.   Psychiatric:         Behavior: Behavior normal. Behavior is cooperative.         Thought Content: Thought content normal.         Judgment: Judgment normal.       Personal Diagnostic Review  CPAP download  APAP 5-20 cm  Compliance Summary  7/6/2020 - 8/4/2020 (30 days)  Days with Device Usage 28 days  Days without Device Usage 2 days  Percent Days with Device Usage 93.3%  Cumulative Usage 8 days 11 hrs. 20 mins. 20 secs.  Maximum Usage (1 Day) 11 hrs. 37 mins. 48 secs.  Average Usage (All Days) 6 hrs. 46 mins. 40 secs.  Average Usage (Days Used) 7 hrs. 15 mins. 43 secs.  Minimum Usage (1 Day) 1 hrs. 16 mins. 12 " "secs.  Percent of Days with Usage >= 4 Hours 86.7%  Percent of Days with Usage < 4 Hours 13.3%  Date Range  Total Blower Time 8 days 13 hrs. 48 mins. 20 secs.  Average AHI 4.8  Auto-CPAP Summary  Auto-CPAP Mean Pressure 6.2 cmH2O  Auto-CPAP Peak Average Pressure 8.2 cmH2O  Average Device Pressure <= 90% of Time 8.1 cmH2O  Average Time in Large Leak Per Day 15 mins. 34 secs.    Home Sleep Study 7/31/2019 and 8/1/2019.  No data on the 1st night of study.  Second night data revealed moderate obstructive sleep apnea with AHI 16.0 events an hour.    Assessment:     1. INDIRA on CPAP    2. Obesity hypoventilation syndrome    3. Cardiomyopathy, dilated    4. PAF (paroxysmal atrial fibrillation)    5. Essential hypertension    6. Mild concentric left ventricular hypertrophy (LVH)      Orders Placed This Encounter   Procedures    HME - OTHER     Please change remotely to CPAP 8 cm     Order Specific Question:   Type of Equipment:     Answer:   cpap     Order Specific Question:   Height:     Answer:   5' 4" (1.626 m)     Order Specific Question:   Weight:     Answer:   111 kg (244 lb 13.1 oz)     Plan:     Problem List Items Addressed This Visit     PAF (paroxysmal atrial fibrillation)     Managed by cardiology  6/10/2020 EKG Normal sinus rhythm          INDIRA on CPAP - Primary (Chronic)     Benefits and compliant with Auto CPAP 5-20 cm  AHI 4.8  Nasal mask  HME: [facility]  Continued adherence  Patient wants more air than 5 cm, change requested for remote change to CPAP 8 cm, pressure 90% of time,  Remote in 2 weeks  Follow up 6 months compliance download           Relevant Orders    HME - OTHER    Obesity hypoventilation syndrome (Chronic)     Moderate obstructive sleep apnea on Auto CPAP with optimal obstructive sleep apnea control          Mild concentric left ventricular hypertrophy (LVH)     Managed by cardiology         Essential hypertension     Managed by primary care provider, Dr. Tito Carbone and cardiology         " Cardiomyopathy, dilated     Followed by cardiology                Follow up in about 6 months (around 2/10/2021) for CPAP 6 mo compliance download.

## 2020-08-10 NOTE — ASSESSMENT & PLAN NOTE
Benefits and compliant with Auto CPAP 5-20 cm  AHI 4.8  Nasal mask  HME: [facility]  Continued adherence  Patient wants more air than 5 cm, change requested for remote change to CPAP 8 cm, pressure 90% of time,  Remote in 2 weeks  Follow up 6 months compliance download

## 2020-12-10 DIAGNOSIS — Z78.9 STATIN INTOLERANCE: ICD-10-CM

## 2020-12-10 DIAGNOSIS — I10 ESSENTIAL HYPERTENSION: ICD-10-CM

## 2020-12-10 DIAGNOSIS — I48.0 PAF (PAROXYSMAL ATRIAL FIBRILLATION): ICD-10-CM

## 2020-12-10 RX ORDER — METOPROLOL TARTRATE 25 MG/1
TABLET, FILM COATED ORAL
Qty: 90 TABLET | Refills: 3 | Status: SHIPPED | OUTPATIENT
Start: 2020-12-10 | End: 2022-01-23 | Stop reason: SDUPTHER

## 2020-12-10 RX ORDER — AMLODIPINE BESYLATE 2.5 MG/1
2.5 TABLET ORAL DAILY
Qty: 90 TABLET | Refills: 3 | Status: SHIPPED | OUTPATIENT
Start: 2020-12-10 | End: 2020-12-11 | Stop reason: SDUPTHER

## 2020-12-11 DIAGNOSIS — I10 ESSENTIAL HYPERTENSION: ICD-10-CM

## 2020-12-11 RX ORDER — AMLODIPINE BESYLATE 2.5 MG/1
2.5 TABLET ORAL DAILY
Qty: 90 TABLET | Refills: 3 | Status: SHIPPED | OUTPATIENT
Start: 2020-12-11 | End: 2022-01-23 | Stop reason: SDUPTHER

## 2020-12-15 ENCOUNTER — OFFICE VISIT (OUTPATIENT)
Dept: CARDIOLOGY | Facility: CLINIC | Age: 65
End: 2020-12-15
Payer: MEDICARE

## 2020-12-15 VITALS
DIASTOLIC BLOOD PRESSURE: 72 MMHG | WEIGHT: 252.44 LBS | BODY MASS INDEX: 43.1 KG/M2 | OXYGEN SATURATION: 99 % | HEIGHT: 64 IN | HEART RATE: 87 BPM | SYSTOLIC BLOOD PRESSURE: 120 MMHG

## 2020-12-15 DIAGNOSIS — Z78.9 STATIN INTOLERANCE: ICD-10-CM

## 2020-12-15 DIAGNOSIS — I48.0 PAF (PAROXYSMAL ATRIAL FIBRILLATION): Primary | ICD-10-CM

## 2020-12-15 DIAGNOSIS — E78.2 MIXED HYPERLIPIDEMIA: ICD-10-CM

## 2020-12-15 DIAGNOSIS — I10 ESSENTIAL HYPERTENSION: ICD-10-CM

## 2020-12-15 DIAGNOSIS — I42.0 CARDIOMYOPATHY, DILATED: ICD-10-CM

## 2020-12-15 PROCEDURE — 99999 PR PBB SHADOW E&M-EST. PATIENT-LVL III: ICD-10-PCS | Mod: PBBFAC,,, | Performed by: NURSE PRACTITIONER

## 2020-12-15 PROCEDURE — 99214 OFFICE O/P EST MOD 30 MIN: CPT | Mod: S$PBB,,, | Performed by: NURSE PRACTITIONER

## 2020-12-15 PROCEDURE — 99214 PR OFFICE/OUTPT VISIT, EST, LEVL IV, 30-39 MIN: ICD-10-PCS | Mod: S$PBB,,, | Performed by: NURSE PRACTITIONER

## 2020-12-15 PROCEDURE — 99999 PR PBB SHADOW E&M-EST. PATIENT-LVL III: CPT | Mod: PBBFAC,,, | Performed by: NURSE PRACTITIONER

## 2020-12-15 PROCEDURE — 99213 OFFICE O/P EST LOW 20 MIN: CPT | Mod: PBBFAC | Performed by: NURSE PRACTITIONER

## 2020-12-15 RX ORDER — EZETIMIBE 10 MG/1
10 TABLET ORAL DAILY
Qty: 90 TABLET | Refills: 3 | Status: SHIPPED | OUTPATIENT
Start: 2020-12-15 | End: 2022-02-01 | Stop reason: SDUPTHER

## 2020-12-15 RX ORDER — DOCUSATE SODIUM 100 MG/1
100 CAPSULE, LIQUID FILLED ORAL NIGHTLY
COMMUNITY

## 2020-12-15 RX ORDER — FERROUS SULFATE 325(65) MG
325 TABLET ORAL NIGHTLY
COMMUNITY

## 2020-12-15 NOTE — PROGRESS NOTES
Subjective:   Patient ID:  Keysha Dewey is a 65 y.o. female who presents for follow up of Atrial Fibrillation and Hypertension      HPI  Ms. Dewey' current medical conditions include PAF (on Eliquis), INDIRA,  bipolar disorder, dilated CMPY, DM, HTN, hyperlipidemia, and statin intolerance. She has a history of gastric bypass 2005 and hysterectomy in 2002. Had knee replacement in Aug 2019 and Dec 2019.   Started wearing CPAP in June 2020.     Has recovered and able to ride her stationary bike 30 min most days      Echo in 2018 revealed LVEF 35%. Underwent LHC in December 2018 to evaluate for CAD as cause of LV decline. LHC revealed normal coronary arteries. Started on medical therapy and repeat echo in January 2019 revealed recovered LVEF to 60%.     Has no complaints today. Denies any chest pain, SOB, COHN,  orthopnea, PND, dizziness, palpitations,  near syncope, syncope or edema . Has no symptoms concerning for angina or equivalent. No CNS Complaints to suggest TIA or CVA. Does well with limiting sodium intake.    Past Medical History:   Diagnosis Date    Atrial fibrillation     Bipolar disorder     Cardiomyopathy, dilated 12/14/2018    DM w/o complication type II     Essential hypertension 1/7/2019    Mixed hyperlipidemia 1/7/2019    Obesity hypoventilation syndrome 12/12/2018    Obstructive sleep apnea 8/9/2019    Statin intolerance 1/7/2019       Past Surgical History:   Procedure Laterality Date    CARDIAC CATHETERIZATION      GASTRIC BYPASS  2006    HYSTERECTOMY  2002    LEFT HEART CATHETERIZATION Left 12/14/2018    Procedure: CATHETERIZATION, HEART, LEFT;  Surgeon: Alexis Smith MD;  Location: [facility] CATH LAB;  Service: Cardiology;  Laterality: Left;    TOTAL KNEE ARTHROPLASTY Right 08/22/2019       Social History     Tobacco Use    Smoking status: Never Smoker   Substance Use Topics    Alcohol use: No    Drug use: No       History reviewed. No pertinent family history.    Current  "Outpatient Medications   Medication Sig    amLODIPine (NORVASC) 2.5 MG tablet Take 1 tablet (2.5 mg total) by mouth once daily.    busPIRone (BUSPAR) 10 MG tablet Take 10 mg by mouth 2 (two) times daily.    carBAMazepine (TEGRETOL) 200 mg tablet Take 200 mg by mouth every evening. Takes one and one-half tablet at night    cyanocobalamin, vitamin B-12, (VITAMIN B-12) 2,500 mcg Subl Place 2,500 mcg under the tongue once daily.    docusate sodium (COLACE) 100 MG capsule Take 100 mg by mouth every evening.    ELIQUIS 5 mg Tab TAKE 1 TABLET(5 MG) BY MOUTH TWICE DAILY    exenatide (BYETTA) 5 mcg/dose (250 mcg/mL) 1.2 mL injection Inject 5 mcg into the skin 2 (two) times daily with meals.    ezetimibe (ZETIA) 10 mg tablet Take 1 tablet (10 mg total) by mouth once daily.    ferrous sulfate (FEOSOL) 325 mg (65 mg iron) Tab tablet Take 325 mg by mouth every evening.    ipratropium (ATROVENT) 0.03 % nasal spray 2 sprays by Nasal route 3 (three) times daily as needed for Rhinitis.    metoprolol tartrate (LOPRESSOR) 25 MG tablet TAKE 1/2 TABLET(12.5 MG) BY MOUTH TWICE DAILY    multivitamin Chew Take by mouth.    nateglinide (STARLIX) 120 MG tablet Take 120 mg by mouth 3 (three) times daily.     SITagliptin (JANUVIA) 100 MG Tab Take 100 mg by mouth once daily.     timolol maleate 0.5% (TIMOPTIC) 0.5 % Drop INT 1 GTT IN OU BID    turmeric, bulk, 95 % Powd by Misc.(Non-Drug; Combo Route) route.    valsartan-hydrochlorothiazide (DIOVAN-HCT) 80-12.5 mg per tablet Take 1 tablet by mouth once daily.    BD ULTRA-FINE SHORT PEN NEEDLE 31 gauge x 5/16" Ndle U UTD BID    lancets (TRUEPLUS LANCETS) 28 gauge Misc USE 1 EACH TWICE DAILY    timolol (BETIMOL) 0.5 % ophthalmic solution 1 drop 2 (two) times daily.     No current facility-administered medications for this visit.      Current Outpatient Medications on File Prior to Visit   Medication Sig    amLODIPine (NORVASC) 2.5 MG tablet Take 1 tablet (2.5 mg total) by " "mouth once daily.    busPIRone (BUSPAR) 10 MG tablet Take 10 mg by mouth 2 (two) times daily.    carBAMazepine (TEGRETOL) 200 mg tablet Take 200 mg by mouth every evening. Takes one and one-half tablet at night    cyanocobalamin, vitamin B-12, (VITAMIN B-12) 2,500 mcg Subl Place 2,500 mcg under the tongue once daily.    docusate sodium (COLACE) 100 MG capsule Take 100 mg by mouth every evening.    ELIQUIS 5 mg Tab TAKE 1 TABLET(5 MG) BY MOUTH TWICE DAILY    exenatide (BYETTA) 5 mcg/dose (250 mcg/mL) 1.2 mL injection Inject 5 mcg into the skin 2 (two) times daily with meals.    ezetimibe (ZETIA) 10 mg tablet Take 1 tablet (10 mg total) by mouth once daily.    ferrous sulfate (FEOSOL) 325 mg (65 mg iron) Tab tablet Take 325 mg by mouth every evening.    ipratropium (ATROVENT) 0.03 % nasal spray 2 sprays by Nasal route 3 (three) times daily as needed for Rhinitis.    metoprolol tartrate (LOPRESSOR) 25 MG tablet TAKE 1/2 TABLET(12.5 MG) BY MOUTH TWICE DAILY    multivitamin Chew Take by mouth.    nateglinide (STARLIX) 120 MG tablet Take 120 mg by mouth 3 (three) times daily.     SITagliptin (JANUVIA) 100 MG Tab Take 100 mg by mouth once daily.     timolol maleate 0.5% (TIMOPTIC) 0.5 % Drop INT 1 GTT IN OU BID    turmeric, bulk, 95 % Powd by Misc.(Non-Drug; Combo Route) route.    valsartan-hydrochlorothiazide (DIOVAN-HCT) 80-12.5 mg per tablet Take 1 tablet by mouth once daily.    BD ULTRA-FINE SHORT PEN NEEDLE 31 gauge x 5/16" Ndle U UTD BID    lancets (TRUEPLUS LANCETS) 28 gauge Misc USE 1 EACH TWICE DAILY    timolol (BETIMOL) 0.5 % ophthalmic solution 1 drop 2 (two) times daily.     No current facility-administered medications on file prior to visit.        Review of Systems   Constitution: Negative for diaphoresis, malaise/fatigue, weight gain and weight loss.   HENT: Negative for congestion and nosebleeds.    Cardiovascular: Negative for chest pain, claudication, cyanosis, dyspnea on exertion, " "irregular heartbeat, leg swelling, near-syncope, orthopnea, palpitations, paroxysmal nocturnal dyspnea and syncope.   Respiratory: Negative for cough, hemoptysis, shortness of breath, sleep disturbances due to breathing, snoring, sputum production and wheezing.         Nightly CPAP    Hematologic/Lymphatic: Negative for bleeding problem. Does not bruise/bleed easily.   Skin: Negative for rash.   Musculoskeletal: Negative for arthritis, back pain, falls, joint pain, muscle cramps and muscle weakness.   Gastrointestinal: Negative for abdominal pain, constipation, diarrhea, heartburn, hematemesis, hematochezia, melena, nausea and vomiting.   Genitourinary: Negative for dysuria, hematuria and nocturia.   Neurological: Negative for excessive daytime sleepiness, dizziness, headaches, light-headedness, loss of balance, numbness, vertigo and weakness.       Objective:   Physical Exam   Constitutional: She is oriented to person, place, and time. She appears well-developed and well-nourished.   Neck: Neck supple. No JVD present.   Cardiovascular: Normal rate, regular rhythm, normal heart sounds and normal pulses. Exam reveals no friction rub.   No murmur heard.  Pulmonary/Chest: Effort normal and breath sounds normal. No respiratory distress. She has no wheezes. She has no rales.   Abdominal: Soft. Bowel sounds are normal. She exhibits no distension.   Obese      Musculoskeletal:         General: No tenderness or edema.   Neurological: She is alert and oriented to person, place, and time.   Skin: Skin is warm and dry. No rash noted.   Psychiatric: She has a normal mood and affect. Her behavior is normal.   Nursing note and vitals reviewed.    Vitals:    12/15/20 1259   BP: 120/72   BP Location: Left arm   Patient Position: Sitting   BP Method: Large (Manual)   Pulse: 87   SpO2: 99%   Weight: 114.5 kg (252 lb 6.8 oz)   Height: 5' 4" (1.626 m)     Lab Results   Component Value Date    CHOL 165 07/25/2019    CHOL 175 04/04/2019 "    CHOL 173 12/13/2018     Lab Results   Component Value Date    HDL 46 07/25/2019    HDL 48 04/04/2019    HDL 42 12/13/2018     Lab Results   Component Value Date    LDLCALC 95.2 07/25/2019    LDLCALC 101.4 04/04/2019    LDLCALC 100.4 12/13/2018     Lab Results   Component Value Date    TRIG 119 07/25/2019    TRIG 128 04/04/2019    TRIG 153 (H) 12/13/2018     Lab Results   Component Value Date    CHOLHDL 27.9 07/25/2019    CHOLHDL 27.4 04/04/2019    CHOLHDL 24.3 12/13/2018       Chemistry        Component Value Date/Time     07/25/2019 0801    K 4.1 07/25/2019 0801     07/25/2019 0801    CO2 25 07/25/2019 0801    BUN 19 07/25/2019 0801    CREATININE 1.0 07/25/2019 0801     (H) 07/25/2019 0801        Component Value Date/Time    CALCIUM 9.6 07/25/2019 0801    ALKPHOS 93 07/25/2019 0801    AST 17 07/25/2019 0801    ALT 18 07/25/2019 0801    BILITOT 0.3 07/25/2019 0801    ESTGFRAFRICA >60.0 07/25/2019 0801    EGFRNONAA >60.0 07/25/2019 0801          No results found for: TSH  Lab Results   Component Value Date    INR 1.0 12/13/2018    INR 1.0 08/17/2016     Lab Results   Component Value Date    WBC 10.71 12/14/2018    HGB 11.9 (L) 12/14/2018    HCT 37.1 12/14/2018    MCV 78 (L) 12/14/2018     12/14/2018     BMP  Sodium   Date Value Ref Range Status   07/25/2019 139 136 - 145 mmol/L Final     Potassium   Date Value Ref Range Status   07/25/2019 4.1 3.5 - 5.1 mmol/L Final     Chloride   Date Value Ref Range Status   07/25/2019 106 95 - 110 mmol/L Final     CO2   Date Value Ref Range Status   07/25/2019 25 23 - 29 mmol/L Final     BUN   Date Value Ref Range Status   07/25/2019 19 8 - 23 mg/dL Final     Creatinine   Date Value Ref Range Status   07/25/2019 1.0 0.5 - 1.4 mg/dL Final     Calcium   Date Value Ref Range Status   07/25/2019 9.6 8.7 - 10.5 mg/dL Final     Anion Gap   Date Value Ref Range Status   07/25/2019 8 8 - 16 mmol/L Final     eGFR if    Date Value Ref Range  Status   07/25/2019 >60.0 >60 mL/min/1.73 m^2 Final     eGFR if non    Date Value Ref Range Status   07/25/2019 >60.0 >60 mL/min/1.73 m^2 Final     Comment:     Calculation used to obtain the estimated glomerular filtration  rate (eGFR) is the CKD-EPI equation.        CrCl cannot be calculated (Patient's most recent lab result is older than the maximum 7 days allowed.).    Assessment:     1. PAF (paroxysmal atrial fibrillation)    2. Essential hypertension    3. Cardiomyopathy, dilated        Plan:     Continue current medical management with metoprolol and Eliquis.   Heart healthy diet  Limit fluid intake 50-60 oz   Daily weights and to notify clinic if weight increases by more than 3 lbs in 1 day or 5 lbs in 1 week.   Exercise routine as tolerated  Weight loss encouraged   RTC in 6 months or sooner if needed

## 2021-01-04 DIAGNOSIS — I48.0 PAF (PAROXYSMAL ATRIAL FIBRILLATION): ICD-10-CM

## 2021-01-27 NOTE — ASSESSMENT & PLAN NOTE
Followed by cardiology     Please be informed that if you contact our office outside of normal business hours the physician on call cannot help with any scheduling or rescheduling issues, procedure instruction questions or any type of medication issue. We advise you for any urgent/emergency that you go to the nearest emergency room!     PLEASE CALL OUR OFFICE DURING NORMAL BUSINESS HOURS    Monday - Friday   8 am to 5 pm    [place]: [address]: [phone]    [place]:  [phone]

## 2021-02-17 ENCOUNTER — TELEPHONE (OUTPATIENT)
Dept: PULMONOLOGY | Facility: CLINIC | Age: 66
End: 2021-02-17

## 2021-02-18 ENCOUNTER — OFFICE VISIT (OUTPATIENT)
Dept: PULMONOLOGY | Facility: CLINIC | Age: 66
End: 2021-02-18
Payer: MEDICARE

## 2021-02-18 VITALS
SYSTOLIC BLOOD PRESSURE: 110 MMHG | HEIGHT: 64 IN | DIASTOLIC BLOOD PRESSURE: 80 MMHG | OXYGEN SATURATION: 97 % | BODY MASS INDEX: 44.15 KG/M2 | RESPIRATION RATE: 20 BRPM | HEART RATE: 86 BPM | WEIGHT: 258.63 LBS

## 2021-02-18 DIAGNOSIS — I10 ESSENTIAL HYPERTENSION: ICD-10-CM

## 2021-02-18 DIAGNOSIS — E11.9 TYPE 2 DIABETES MELLITUS WITHOUT COMPLICATION, WITHOUT LONG-TERM CURRENT USE OF INSULIN: ICD-10-CM

## 2021-02-18 DIAGNOSIS — I42.0 CARDIOMYOPATHY, DILATED: ICD-10-CM

## 2021-02-18 DIAGNOSIS — E66.2 OBESITY HYPOVENTILATION SYNDROME: Chronic | ICD-10-CM

## 2021-02-18 DIAGNOSIS — G47.33 OSA ON CPAP: Primary | ICD-10-CM

## 2021-02-18 DIAGNOSIS — I48.0 PAF (PAROXYSMAL ATRIAL FIBRILLATION): ICD-10-CM

## 2021-02-18 DIAGNOSIS — F31.9 BIPOLAR AFFECTIVE DISORDER, REMISSION STATUS UNSPECIFIED: Chronic | ICD-10-CM

## 2021-02-18 PROCEDURE — 99214 PR OFFICE/OUTPT VISIT, EST, LEVL IV, 30-39 MIN: ICD-10-PCS | Mod: S$PBB,,, | Performed by: NURSE PRACTITIONER

## 2021-02-18 PROCEDURE — 99999 PR PBB SHADOW E&M-EST. PATIENT-LVL V: CPT | Mod: PBBFAC,,, | Performed by: NURSE PRACTITIONER

## 2021-02-18 PROCEDURE — 99215 OFFICE O/P EST HI 40 MIN: CPT | Mod: PBBFAC | Performed by: NURSE PRACTITIONER

## 2021-02-18 PROCEDURE — 99999 PR PBB SHADOW E&M-EST. PATIENT-LVL V: ICD-10-PCS | Mod: PBBFAC,,, | Performed by: NURSE PRACTITIONER

## 2021-02-18 PROCEDURE — 99214 OFFICE O/P EST MOD 30 MIN: CPT | Mod: S$PBB,,, | Performed by: NURSE PRACTITIONER

## 2021-04-26 ENCOUNTER — PATIENT MESSAGE (OUTPATIENT)
Dept: PULMONOLOGY | Facility: CLINIC | Age: 66
End: 2021-04-26

## 2021-05-04 ENCOUNTER — PATIENT MESSAGE (OUTPATIENT)
Dept: RESEARCH | Facility: HOSPITAL | Age: 66
End: 2021-05-04

## 2021-08-09 ENCOUNTER — PATIENT MESSAGE (OUTPATIENT)
Dept: PULMONOLOGY | Facility: CLINIC | Age: 66
End: 2021-08-09

## 2021-08-11 ENCOUNTER — OFFICE VISIT (OUTPATIENT)
Dept: PULMONOLOGY | Facility: CLINIC | Age: 66
End: 2021-08-11
Payer: MEDICARE

## 2021-08-11 VITALS
BODY MASS INDEX: 40.96 KG/M2 | WEIGHT: 245.81 LBS | RESPIRATION RATE: 16 BRPM | OXYGEN SATURATION: 98 % | HEART RATE: 85 BPM | SYSTOLIC BLOOD PRESSURE: 118 MMHG | DIASTOLIC BLOOD PRESSURE: 70 MMHG | HEIGHT: 65 IN

## 2021-08-11 DIAGNOSIS — F31.9 BIPOLAR AFFECTIVE DISORDER, REMISSION STATUS UNSPECIFIED: Chronic | ICD-10-CM

## 2021-08-11 DIAGNOSIS — Z86.16 HISTORY OF COVID-19: ICD-10-CM

## 2021-08-11 DIAGNOSIS — E11.9 TYPE 2 DIABETES MELLITUS WITHOUT COMPLICATION, WITHOUT LONG-TERM CURRENT USE OF INSULIN: ICD-10-CM

## 2021-08-11 DIAGNOSIS — I48.0 PAF (PAROXYSMAL ATRIAL FIBRILLATION): ICD-10-CM

## 2021-08-11 DIAGNOSIS — I10 ESSENTIAL HYPERTENSION: ICD-10-CM

## 2021-08-11 DIAGNOSIS — G47.33 OSA ON CPAP: Primary | ICD-10-CM

## 2021-08-11 DIAGNOSIS — I42.0 CARDIOMYOPATHY, DILATED: ICD-10-CM

## 2021-08-11 DIAGNOSIS — E66.2 OBESITY HYPOVENTILATION SYNDROME: Chronic | ICD-10-CM

## 2021-08-11 PROCEDURE — 99999 PR PBB SHADOW E&M-EST. PATIENT-LVL III: CPT | Mod: PBBFAC,,, | Performed by: NURSE PRACTITIONER

## 2021-08-11 PROCEDURE — 99214 OFFICE O/P EST MOD 30 MIN: CPT | Mod: S$PBB,,, | Performed by: NURSE PRACTITIONER

## 2021-08-11 PROCEDURE — 99213 OFFICE O/P EST LOW 20 MIN: CPT | Mod: PBBFAC | Performed by: NURSE PRACTITIONER

## 2021-08-11 PROCEDURE — 99999 PR PBB SHADOW E&M-EST. PATIENT-LVL III: ICD-10-PCS | Mod: PBBFAC,,, | Performed by: NURSE PRACTITIONER

## 2021-08-11 PROCEDURE — 99214 PR OFFICE/OUTPT VISIT, EST, LEVL IV, 30-39 MIN: ICD-10-PCS | Mod: S$PBB,,, | Performed by: NURSE PRACTITIONER

## 2021-08-11 RX ORDER — NAPROXEN SODIUM 220 MG/1
TABLET, FILM COATED ORAL
COMMUNITY
End: 2022-06-28

## 2021-08-11 RX ORDER — SEMAGLUTIDE 1.34 MG/ML
1 INJECTION, SOLUTION SUBCUTANEOUS
COMMUNITY
Start: 2021-06-02

## 2021-08-11 RX ORDER — CARBAMAZEPINE 200 MG/1
1.5 TABLET ORAL NIGHTLY
COMMUNITY
Start: 2021-07-27 | End: 2022-02-21

## 2021-08-11 RX ORDER — NATEGLINIDE 60 MG/1
TABLET ORAL
COMMUNITY

## 2021-08-11 RX ORDER — TIMOLOL MALEATE 5 MG/ML
1 SOLUTION/ DROPS OPHTHALMIC 2 TIMES DAILY
COMMUNITY
Start: 2021-06-29

## 2021-08-11 RX ORDER — SEMAGLUTIDE 1.34 MG/ML
2 INJECTION, SOLUTION SUBCUTANEOUS
COMMUNITY
Start: 2021-06-02

## 2021-08-11 RX ORDER — LATANOPROST 50 UG/ML
SOLUTION/ DROPS OPHTHALMIC
COMMUNITY

## 2021-08-29 NOTE — TELEPHONE ENCOUNTER
----- Message from Jamie Ortega sent at 8/5/2019  4:19 PM CDT -----  Contact: [phone]  Pt would like a call from nurse in regards to a Home sleep study. Pt states she wasn't aware of the Study, also the pt states the machine was turned in. Please call back at [phone].      Thank You,   Jamie Ortega    
Returned patient phone call left message on voicemail.  
pt c/o right hip and leg pain  since yesterday pt denies fever fall trauma

## 2021-09-15 ENCOUNTER — PATIENT MESSAGE (OUTPATIENT)
Dept: PULMONOLOGY | Facility: CLINIC | Age: 66
End: 2021-09-15

## 2021-09-16 ENCOUNTER — OFFICE VISIT (OUTPATIENT)
Dept: CARDIOLOGY | Facility: CLINIC | Age: 66
End: 2021-09-16
Payer: MEDICARE

## 2021-09-16 VITALS
SYSTOLIC BLOOD PRESSURE: 126 MMHG | WEIGHT: 248.25 LBS | HEART RATE: 66 BPM | BODY MASS INDEX: 41.36 KG/M2 | HEIGHT: 65 IN | OXYGEN SATURATION: 97 % | DIASTOLIC BLOOD PRESSURE: 78 MMHG

## 2021-09-16 DIAGNOSIS — I48.0 PAF (PAROXYSMAL ATRIAL FIBRILLATION): ICD-10-CM

## 2021-09-16 DIAGNOSIS — I42.0 CARDIOMYOPATHY, DILATED: Primary | ICD-10-CM

## 2021-09-16 DIAGNOSIS — I51.7 MILD CONCENTRIC LEFT VENTRICULAR HYPERTROPHY (LVH): ICD-10-CM

## 2021-09-16 DIAGNOSIS — R93.1 ABNORMAL ECHOCARDIOGRAM: ICD-10-CM

## 2021-09-16 DIAGNOSIS — E66.2 OBESITY HYPOVENTILATION SYNDROME: Chronic | ICD-10-CM

## 2021-09-16 DIAGNOSIS — Z78.9 STATIN INTOLERANCE: ICD-10-CM

## 2021-09-16 DIAGNOSIS — I10 ESSENTIAL HYPERTENSION: ICD-10-CM

## 2021-09-16 DIAGNOSIS — F31.9 BIPOLAR AFFECTIVE DISORDER, REMISSION STATUS UNSPECIFIED: Chronic | ICD-10-CM

## 2021-09-16 DIAGNOSIS — E78.2 MIXED HYPERLIPIDEMIA: ICD-10-CM

## 2021-09-16 DIAGNOSIS — Z86.16 HISTORY OF COVID-19: ICD-10-CM

## 2021-09-16 DIAGNOSIS — E11.9 TYPE 2 DIABETES MELLITUS WITHOUT COMPLICATION, WITHOUT LONG-TERM CURRENT USE OF INSULIN: ICD-10-CM

## 2021-09-16 DIAGNOSIS — G47.33 OSA ON CPAP: Chronic | ICD-10-CM

## 2021-09-16 PROCEDURE — 99999 PR PBB SHADOW E&M-EST. PATIENT-LVL III: ICD-10-PCS | Mod: PBBFAC,,, | Performed by: INTERNAL MEDICINE

## 2021-09-16 PROCEDURE — 99213 OFFICE O/P EST LOW 20 MIN: CPT | Mod: S$PBB,,, | Performed by: INTERNAL MEDICINE

## 2021-09-16 PROCEDURE — 99213 OFFICE O/P EST LOW 20 MIN: CPT | Mod: PBBFAC | Performed by: INTERNAL MEDICINE

## 2021-09-16 PROCEDURE — 99213 PR OFFICE/OUTPT VISIT, EST, LEVL III, 20-29 MIN: ICD-10-PCS | Mod: S$PBB,,, | Performed by: INTERNAL MEDICINE

## 2021-09-16 PROCEDURE — 99999 PR PBB SHADOW E&M-EST. PATIENT-LVL III: CPT | Mod: PBBFAC,,, | Performed by: INTERNAL MEDICINE

## 2022-01-23 DIAGNOSIS — I48.0 PAF (PAROXYSMAL ATRIAL FIBRILLATION): ICD-10-CM

## 2022-01-23 DIAGNOSIS — I10 ESSENTIAL HYPERTENSION: ICD-10-CM

## 2022-01-23 DIAGNOSIS — Z78.9 STATIN INTOLERANCE: ICD-10-CM

## 2022-01-24 RX ORDER — AMLODIPINE BESYLATE 2.5 MG/1
2.5 TABLET ORAL DAILY
Qty: 90 TABLET | Refills: 3 | Status: SHIPPED | OUTPATIENT
Start: 2022-01-24 | End: 2022-12-20

## 2022-01-24 RX ORDER — METOPROLOL TARTRATE 25 MG/1
TABLET, FILM COATED ORAL
Qty: 90 TABLET | Refills: 3 | Status: SHIPPED | OUTPATIENT
Start: 2022-01-24 | End: 2022-12-20

## 2022-01-24 RX ORDER — VALSARTAN AND HYDROCHLOROTHIAZIDE 80; 12.5 MG/1; MG/1
1 TABLET, FILM COATED ORAL DAILY
Qty: 90 TABLET | Refills: 3 | Status: SHIPPED | OUTPATIENT
Start: 2022-01-24 | End: 2022-12-27

## 2022-02-01 DIAGNOSIS — E78.2 MIXED HYPERLIPIDEMIA: ICD-10-CM

## 2022-02-01 DIAGNOSIS — Z78.9 STATIN INTOLERANCE: ICD-10-CM

## 2022-02-01 RX ORDER — EZETIMIBE 10 MG/1
10 TABLET ORAL DAILY
Qty: 90 TABLET | Refills: 3 | Status: SHIPPED | OUTPATIENT
Start: 2022-02-01 | End: 2022-02-03 | Stop reason: SDUPTHER

## 2022-02-03 DIAGNOSIS — Z78.9 STATIN INTOLERANCE: ICD-10-CM

## 2022-02-03 DIAGNOSIS — E78.2 MIXED HYPERLIPIDEMIA: ICD-10-CM

## 2022-02-03 RX ORDER — EZETIMIBE 10 MG/1
10 TABLET ORAL DAILY
Qty: 90 TABLET | Refills: 3 | Status: SHIPPED | OUTPATIENT
Start: 2022-02-03 | End: 2023-03-20

## 2022-02-21 ENCOUNTER — OFFICE VISIT (OUTPATIENT)
Dept: PULMONOLOGY | Facility: CLINIC | Age: 67
End: 2022-02-21
Payer: MEDICARE

## 2022-02-21 VITALS
RESPIRATION RATE: 18 BRPM | HEART RATE: 72 BPM | SYSTOLIC BLOOD PRESSURE: 132 MMHG | BODY MASS INDEX: 41.01 KG/M2 | HEIGHT: 64 IN | DIASTOLIC BLOOD PRESSURE: 80 MMHG | WEIGHT: 240.19 LBS | OXYGEN SATURATION: 98 %

## 2022-02-21 DIAGNOSIS — I48.0 PAF (PAROXYSMAL ATRIAL FIBRILLATION): ICD-10-CM

## 2022-02-21 DIAGNOSIS — G47.33 OSA ON CPAP: Primary | ICD-10-CM

## 2022-02-21 DIAGNOSIS — I42.0 CARDIOMYOPATHY, DILATED: ICD-10-CM

## 2022-02-21 DIAGNOSIS — F31.9 BIPOLAR AFFECTIVE DISORDER, REMISSION STATUS UNSPECIFIED: Chronic | ICD-10-CM

## 2022-02-21 DIAGNOSIS — E11.9 TYPE 2 DIABETES MELLITUS WITHOUT COMPLICATION, WITHOUT LONG-TERM CURRENT USE OF INSULIN: ICD-10-CM

## 2022-02-21 DIAGNOSIS — E66.01 CLASS 3 SEVERE OBESITY DUE TO EXCESS CALORIES WITH SERIOUS COMORBIDITY AND BODY MASS INDEX (BMI) OF 40.0 TO 44.9 IN ADULT: ICD-10-CM

## 2022-02-21 PROBLEM — E66.813 CLASS 3 SEVERE OBESITY DUE TO EXCESS CALORIES WITH SERIOUS COMORBIDITY AND BODY MASS INDEX (BMI) OF 40.0 TO 44.9 IN ADULT: Status: ACTIVE | Noted: 2018-12-12

## 2022-02-21 PROCEDURE — 99214 PR OFFICE/OUTPT VISIT, EST, LEVL IV, 30-39 MIN: ICD-10-PCS | Mod: S$PBB,,, | Performed by: NURSE PRACTITIONER

## 2022-02-21 PROCEDURE — 99999 PR PBB SHADOW E&M-EST. PATIENT-LVL III: CPT | Mod: PBBFAC,,, | Performed by: NURSE PRACTITIONER

## 2022-02-21 PROCEDURE — 99999 PR PBB SHADOW E&M-EST. PATIENT-LVL III: ICD-10-PCS | Mod: PBBFAC,,, | Performed by: NURSE PRACTITIONER

## 2022-02-21 PROCEDURE — 99214 OFFICE O/P EST MOD 30 MIN: CPT | Mod: S$PBB,,, | Performed by: NURSE PRACTITIONER

## 2022-02-21 PROCEDURE — 99213 OFFICE O/P EST LOW 20 MIN: CPT | Mod: PBBFAC | Performed by: NURSE PRACTITIONER

## 2022-02-21 RX ORDER — FLUORIDE (SODIUM) 1.1 %
PASTE (ML) DENTAL
COMMUNITY
Start: 2021-12-27

## 2022-02-21 NOTE — ASSESSMENT & PLAN NOTE
On CPAP 8 cm with benefit   Nasal mask used, patient request change to Full face mask, possibly Tesha view Full face mask.   Updated supply order   [facility]: [facility]

## 2022-02-21 NOTE — ASSESSMENT & PLAN NOTE
Managed by primary care provider  Hemoglobin A1C   Date Value Ref Range Status   07/25/2019 7.1 (H) 4.0 - 5.6 % Final     Comment:     ADA Screening Guidelines:  5.7-6.4%  Consistent with prediabetes  >or=6.5%  Consistent with diabetes  High levels of fetal hemoglobin interfere with the HbA1C  assay. Heterozygous hemoglobin variants (HbS, HgC, etc)do  not significantly interfere with this assay.   However, presence of multiple variants may affect accuracy.     04/04/2019 8.1 (H) 4.0 - 5.6 % Final     Comment:     ADA Screening Guidelines:  5.7-6.4%  Consistent with prediabetes  >or=6.5%  Consistent with diabetes  High levels of fetal hemoglobin interfere with the HbA1C  assay. Heterozygous hemoglobin variants (HbS, HgC, etc)do  not significantly interfere with this assay.   However, presence of multiple variants may affect accuracy.     12/12/2018 9.2 (H) 4.0 - 5.6 % Final     Comment:     ADA Screening Guidelines:  5.7-6.4%  Consistent with prediabetes  >or=6.5%  Consistent with diabetes  High levels of fetal hemoglobin interfere with the HbA1C  assay. Heterozygous hemoglobin variants (HbS, HgC, etc)do  not significantly interfere with this assay.   However, presence of multiple variants may affect accuracy.

## 2022-02-21 NOTE — PROGRESS NOTES
Subjective:      Patient ID: Keysha Dewey is a 66 y.o. female.    Chief Complaint: Sleep Apnea    HPI: Keysha Dewey presents to clinic for follow up for INDIRA     She is on CPAP of 8 cmH2O pressure with apneic index (AHI) 1.0.   Complaince download today reveals 43% of days with greater than 4 hours of device use.   Usage down over past month, related to travel with machine, but forgot chin strap.   Patient reports benefit from CPAP use.  Patient reports no complaints other than some nights does not like wearing CPAP with some claustrophobia, over all pleased with benefit of CPAP and wants to continue CPAP use. Nasal wisp mask is used.     Home sleep study 7/31/2019 and 8/1/2019.  No data on the 1st night of study.  Second night data revealed moderate obstructive sleep apnea with AHI 16.0 events an hour.    Compliance Report  Compliance  Payor Standard  Usage 01/21/2022 - 02/19/2022  Usage days 13/30 days (43%)  >= 4 hours 9 days (30%)  < 4 hours 4 days (13%)  Usage hours 56 hours 29 minutes  Average usage (total days) 1 hours 53 minutes  Average usage (days used) 4 hours 21 minutes  Median usage (days used) 4 hours 57 minutes  Total used hours (value since last reset - 02/19/2022) 613 hours  AirSense 10 AutoSet  Serial number [id number]  Mode CPAP  Set pressure 8 cmH2O  EPR Fulltime  EPR level 3  Therapy  Leaks - L/min Median: 8.7 95th percentile: 24.9 Maximum: 34.5  Events per hour AI: 1.0 HI: 0.0 AHI: 1.0  Apnea Index Central: 0.4 Obstructive: 0.5 Unknown: 0.0  RERA Index 0.1    [place] Sleepiness Scale   EPWORTH SLEEPINESS SCALE 2/21/2022 8/11/2021 2/18/2021 8/10/2020 6/10/2020 8/9/2019 7/17/2019   Sitting and reading 0 1 1 1 0 1 1   Watching TV 0 1 1 1 3 2 1   Sitting, inactive in a public place (e.g. a theatre or a meeting) 0 0 1 0 0 0 0   As a passenger in a car for an hour without a break 2 3 3 3 3 3 2   Lying down to rest in the afternoon when circumstances permit 0 1 1 0 0 0 3   Sitting and  talking to someone 0 0 0 0 0 0 0   Sitting quietly after a lunch without alcohol 0 0 1 0 2 2 1   In a car, while stopped for a few minutes in traffic 0 1 1 0 0 0 0   Total score 2 7 9 5 8 8 8       Prior obstructive sleep apnea history:   She was diagnosis with obstructive sleep apnea home sleep study 7/31/2019 and 8/1/2019.  No data on the 1st night of study.  Second night data revealed moderate obstructive sleep apnea with AHI 16.0 events an hour.  She was traveling and both knees replaced TKA's with separate surgeries at  orthopedics clinic with Dr. Joe Foss , so did not begin auto CPAP when ordered August 2019.   June 2020 with orders to begin Auto CPAP.     Other history:  PAF (on Eliquis), bipolar disorder, dilated CMPY, DM, HTN, hyperlipidemia, and statin intolerance. She has a history of gastric bypass 2005 and hysterectomy in 2002. Had knee replacement in Aug 2019 and Dec 2019.   Echo in 2018 revealed LVEF 35%. Underwent LHC in December 2018 to evaluate for CAD as cause of LV decline. LHC revealed normal coronary arteries. Started on medical therapy and repeat echo in January 2019 revealed recovered LVEF to 60%.     Previous Report Reviewed: lab reports and office notes     Past Medical History: The following portions of the patient's history were reviewed and updated as appropriate:   She  has a past surgical history that includes Gastric bypass (2005); Hysterectomy (2002); Cardiac catheterization; Left heart catheterization (Left, 12/14/2018); and Total knee arthroplasty (Right, 08/22/2019).  Her family history is not on file.  She  reports that she has never smoked. She has never used smokeless tobacco. She reports that she does not drink alcohol and does not use drugs.  She has a current medication list which includes the following prescription(s): amlodipine, apixaban, aspirin, bd ultra-fine short pen needle, buspirone, carbamazepine, cyanocobalamin (vitamin b-12), cyanocobalamin-cobamamide,  "docusate sodium, estradiol-norethindrone, exenatide, ezetimibe, ferrous sulfate, ipratropium, lancets, latanoprost, metoprolol tartrate, multivitamin, nateglinide, nateglinide, ozempic, prevident 5000 booster plus, ozempic, sitagliptin, timolol maleate 0.5%, timolol maleate 0.5%, turmeric (bulk), valsartan-hydrochlorothiazide, and vit a-vit c-zinc-propolis.  She is allergic to lipitor [atorvastatin]..    The following portions of the patient's history were reviewed and updated as appropriate: allergies, current medications, past family history, past medical history, past social history, past surgical history and problem list.    Review of Systems   Constitutional: Negative for fever, chills, weight loss, weight gain, activity change, appetite change, fatigue and night sweats.   HENT: Negative for postnasal drip, rhinorrhea, sinus pressure, voice change and congestion.    Eyes: Negative for redness and itching.   Respiratory: Negative for snoring, cough, sputum production, chest tightness, shortness of breath, wheezing, orthopnea, asthma nighttime symptoms, dyspnea on extertion, use of rescue inhaler and somnolence.    Cardiovascular: Negative.  Negative for chest pain, palpitations and leg swelling.   Genitourinary: Negative for difficulty urinating and hematuria.   Endocrine: Negative for cold intolerance and heat intolerance.    Musculoskeletal: Negative for arthralgias, gait problem, joint swelling and myalgias.   Skin: Negative.    Gastrointestinal: Negative for nausea, vomiting, abdominal pain and acid reflux.   Neurological: Negative for dizziness, weakness, light-headedness and headaches.   Hematological: Negative for adenopathy. No excessive bruising.   All other systems reviewed and are negative.     Objective:   /80   Pulse 72   Resp 18   Ht 5' 4" (1.626 m)   Wt 109 kg (240 lb 3.1 oz)   SpO2 98%   BMI 41.23 kg/m²   Physical Exam  Vitals reviewed.   Constitutional:       General: She is not in " acute distress.     Appearance: She is well-developed. She is not ill-appearing or toxic-appearing.   HENT:      Head: Normocephalic.      Right Ear: External ear normal.      Left Ear: External ear normal.      Nose: Nose normal.      Mouth/Throat:      Pharynx: No oropharyngeal exudate.   Eyes:      Conjunctiva/sclera: Conjunctivae normal.   Cardiovascular:      Rate and Rhythm: Normal rate and regular rhythm.      Heart sounds: Normal heart sounds.   Pulmonary:      Effort: Pulmonary effort is normal.      Breath sounds: Normal breath sounds. No stridor.   Abdominal:      Palpations: Abdomen is soft.   Musculoskeletal:         General: Normal range of motion.      Cervical back: Normal range of motion and neck supple.   Lymphadenopathy:      Cervical: No cervical adenopathy.   Skin:     General: Skin is warm and dry.   Neurological:      Mental Status: She is alert and oriented to person, place, and time.   Psychiatric:         Behavior: Behavior normal. Behavior is cooperative.         Thought Content: Thought content normal.         Judgment: Judgment normal.       Personal Diagnostic Review  CPAP download     Home Sleep Study 7/31/2019 and 8/1/2019.  No data on the 1st night of study.  Second night data revealed moderate obstructive sleep apnea with AHI 16.0 events an hour.    Assessment:     1. INDIRA on CPAP    2. Class 3 severe obesity due to excess calories with serious comorbidity and body mass index (BMI) of 40.0 to 44.9 in adult    3. PAF (paroxysmal atrial fibrillation)    4. Type 2 diabetes mellitus without complication, without long-term current use of insulin    5. Cardiomyopathy, dilated    6. Bipolar affective disorder, remission status unspecified      Orders Placed This Encounter   Procedures    CPAP/BIPAP SUPPLIES     Benefits and compliant  90 day supply. 4 refills  [facility]: [facility]    Patient has nasal wisp mask, she would like change to Full face mask, possibly Tesha view Full face mask. She will  make appointment with Sonya for mask fitting     Order Specific Question:   Length of need (1-99 months):     Answer:   99     Order Specific Question:   Choose ONE mask type and its corresponding cushions and/or pillows:     Answer:    Full Face Mask, 1 per 90 days:  Full Face Cushion, (3 per 90 days)     Order Specific Question:   Choose EITHER Heated or Non-Heated Tubjing     Answer:    Non-Heated Tubing, 1 per 90 days     Order Specific Question:   Number of Days Needed:     Answer:   99     Order Specific Question:   All other supplies as needed as listed below:     Answer:    Headgear, 1 per 180 days     Order Specific Question:   All other supplies as needed as listed below:     Answer:    Chin Strap, 1 per 180 days     Order Specific Question:   All other supplies as needed as listed below:     Answer:    Disposable Filter, 6 per 90 days     Order Specific Question:   All other supplies as needed as listed below:     Answer:    Humidifier Chamber, 1 per 180 days     Order Specific Question:   All other supplies as needed as listed below:     Answer:    Non-Disposable Filter, 1 per 180 days     Plan:     Problem List Items Addressed This Visit     INDIRA on CPAP - Primary (Chronic)     On CPAP 8 cm with benefit   Nasal mask used, patient request change to Full face mask, possibly Tesha view Full face mask.   Updated supply order   HME: [facility]              Relevant Orders    CPAP/BIPAP SUPPLIES    Bipolar disorder (Chronic)     Managed by primary care provider and psychiatry              Type 2 diabetes mellitus, without long-term current use of insulin     Managed by primary care provider  Hemoglobin A1C   Date Value Ref Range Status   07/25/2019 7.1 (H) 4.0 - 5.6 % Final     Comment:     ADA Screening Guidelines:  5.7-6.4%  Consistent with prediabetes  >or=6.5%  Consistent with diabetes  High levels of fetal hemoglobin interfere with the HbA1C  assay. Heterozygous  hemoglobin variants (HbS, HgC, etc)do  not significantly interfere with this assay.   However, presence of multiple variants may affect accuracy.     04/04/2019 8.1 (H) 4.0 - 5.6 % Final     Comment:     ADA Screening Guidelines:  5.7-6.4%  Consistent with prediabetes  >or=6.5%  Consistent with diabetes  High levels of fetal hemoglobin interfere with the HbA1C  assay. Heterozygous hemoglobin variants (HbS, HgC, etc)do  not significantly interfere with this assay.   However, presence of multiple variants may affect accuracy.     12/12/2018 9.2 (H) 4.0 - 5.6 % Final     Comment:     ADA Screening Guidelines:  5.7-6.4%  Consistent with prediabetes  >or=6.5%  Consistent with diabetes  High levels of fetal hemoglobin interfere with the HbA1C  assay. Heterozygous hemoglobin variants (HbS, HgC, etc)do  not significantly interfere with this assay.   However, presence of multiple variants may affect accuracy.                  PAF (paroxysmal atrial fibrillation)     Managed by cardiology  6/10/2020 EKG Normal sinus rhythm            Class 3 severe obesity due to excess calories with serious comorbidity and body mass index (BMI) of 40.0 to 44.9 in adult     Moderate obstructive sleep apnea on Auto CPAP with optimal obstructive sleep apnea control   Working on weight loss   Had gastric bypass in 2005   Wt Readings from Last 8 Encounters:   02/21/22 109 kg (240 lb 3.1 oz)   09/16/21 112.6 kg (248 lb 3.8 oz)   08/11/21 111.5 kg (245 lb 13 oz)   02/18/21 117.3 kg (258 lb 9.6 oz)   12/15/20 114.5 kg (252 lb 6.8 oz)   08/10/20 111 kg (244 lb 13.1 oz)   06/10/20 108.4 kg (238 lb 15.7 oz)   06/10/20 108.4 kg (238 lb 15.7 oz)   Body mass index is 41.23 kg/m².               Cardiomyopathy, dilated     Managed by cardiology                  Follow up in about 1 year (around 2/21/2023) for CPAP 1 year compliance download.

## 2022-08-02 DIAGNOSIS — I42.0 CARDIOMYOPATHY, DILATED: Primary | ICD-10-CM

## 2022-08-03 ENCOUNTER — OFFICE VISIT (OUTPATIENT)
Dept: CARDIOLOGY | Facility: CLINIC | Age: 67
End: 2022-08-03
Payer: MEDICARE

## 2022-08-03 ENCOUNTER — HOSPITAL ENCOUNTER (OUTPATIENT)
Dept: CARDIOLOGY | Facility: HOSPITAL | Age: 67
Discharge: HOME | End: 2022-08-03
Attending: INTERNAL MEDICINE
Payer: MEDICARE

## 2022-08-03 VITALS
RESPIRATION RATE: 16 BRPM | SYSTOLIC BLOOD PRESSURE: 122 MMHG | WEIGHT: 238 LBS | HEART RATE: 77 BPM | BODY MASS INDEX: 40.63 KG/M2 | DIASTOLIC BLOOD PRESSURE: 74 MMHG | OXYGEN SATURATION: 98 % | HEIGHT: 64 IN

## 2022-08-03 DIAGNOSIS — I10 ESSENTIAL HYPERTENSION: ICD-10-CM

## 2022-08-03 DIAGNOSIS — E78.2 MIXED HYPERLIPIDEMIA: ICD-10-CM

## 2022-08-03 DIAGNOSIS — R93.1 ABNORMAL ECHOCARDIOGRAM: ICD-10-CM

## 2022-08-03 DIAGNOSIS — I48.0 PAF (PAROXYSMAL ATRIAL FIBRILLATION): ICD-10-CM

## 2022-08-03 DIAGNOSIS — Z78.9 STATIN INTOLERANCE: ICD-10-CM

## 2022-08-03 DIAGNOSIS — E66.01 CLASS 3 SEVERE OBESITY DUE TO EXCESS CALORIES WITH SERIOUS COMORBIDITY AND BODY MASS INDEX (BMI) OF 40.0 TO 44.9 IN ADULT: ICD-10-CM

## 2022-08-03 DIAGNOSIS — I42.0 CARDIOMYOPATHY, DILATED: Primary | ICD-10-CM

## 2022-08-03 DIAGNOSIS — G47.33 OSA ON CPAP: Chronic | ICD-10-CM

## 2022-08-03 DIAGNOSIS — E11.9 TYPE 2 DIABETES MELLITUS WITHOUT COMPLICATION, WITHOUT LONG-TERM CURRENT USE OF INSULIN: ICD-10-CM

## 2022-08-03 DIAGNOSIS — I42.0 CARDIOMYOPATHY, DILATED: ICD-10-CM

## 2022-08-03 DIAGNOSIS — Z86.16 HISTORY OF COVID-19: ICD-10-CM

## 2022-08-03 DIAGNOSIS — I51.7 MILD CONCENTRIC LEFT VENTRICULAR HYPERTROPHY (LVH): ICD-10-CM

## 2022-08-03 PROCEDURE — 99999 PR PBB SHADOW E&M-EST. PATIENT-LVL V: ICD-10-PCS | Mod: PBBFAC,,, | Performed by: INTERNAL MEDICINE

## 2022-08-03 PROCEDURE — 99999 PR PBB SHADOW E&M-EST. PATIENT-LVL V: CPT | Mod: PBBFAC,,, | Performed by: INTERNAL MEDICINE

## 2022-08-03 PROCEDURE — 93010 EKG 12-LEAD: ICD-10-PCS | Mod: ,,, | Performed by: INTERNAL MEDICINE

## 2022-08-03 PROCEDURE — 99214 OFFICE O/P EST MOD 30 MIN: CPT | Mod: S$PBB,,, | Performed by: INTERNAL MEDICINE

## 2022-08-03 PROCEDURE — 93005 ELECTROCARDIOGRAM TRACING: CPT

## 2022-08-03 PROCEDURE — 99215 OFFICE O/P EST HI 40 MIN: CPT | Mod: PBBFAC | Performed by: INTERNAL MEDICINE

## 2022-08-03 PROCEDURE — 93010 ELECTROCARDIOGRAM REPORT: CPT | Mod: ,,, | Performed by: INTERNAL MEDICINE

## 2022-08-03 PROCEDURE — 99214 PR OFFICE/OUTPT VISIT, EST, LEVL IV, 30-39 MIN: ICD-10-PCS | Mod: S$PBB,,, | Performed by: INTERNAL MEDICINE

## 2022-08-03 NOTE — PROGRESS NOTES
Subjective:   Patient ID:  Keysha Dewey is a 66 y.o. female who presents for follow up of No chief complaint on file.      HPI  HPI12/15/2020 FRom Geremias PEDRAZA  Ms. Dewey' current medical conditions include PAF (on Eliquis), INDIRA,  bipolar disorder, dilated CMPY, DM, HTN, hyperlipidemia, and statin intolerance. She has a history of gastric bypass 2005 and hysterectomy in 2002. Had knee replacement in Aug 2019 and Dec 2019.   Started wearing CPAP in June 2020.      Has recovered and able to ride her stationary bike 30 min most days       Echo in 2018 revealed LVEF 35%. Underwent LHC in December 2018 to evaluate for CAD as cause of LV decline. LHC revealed normal coronary arteries. Started on medical therapy and repeat echo in January 2019 revealed recovered LVEF to 60%.      Has no complaints today. Denies any chest pain, SOB, COHN,  orthopnea, PND, dizziness, palpitations,  near syncope, syncope or edema . Has no symptoms concerning for angina or equivalent. No CNS Complaints to suggest TIA or CVA. Does well with limiting sodium intake.     9/16/2021    SHE IS EXERCISING AT HOME ON HER MACHINE AT HOME. HAS NO CHEST PAIN OR SHORTNESS OF BREATH . HAS HAD KNEE REPLACEMENT HAS DONE WELL WITH IT. HAS BEEN VERY ACTIVE COMPLIANT WITH DIET AND MEDS     8/3/2022  Exercises 20 minutes daily 6 days a week using a bike exercise. She stretches also her diabetes is uncontrolled is being addressed by pc. Her lipids on target she is going to diabetes education. Has no cardiovascular symptoms. Using cpap regularily htn reasonnable controlled   Past Medical History:   Diagnosis Date    Atrial fibrillation     Bipolar disorder     Cardiomyopathy, dilated 12/14/2018    Diabetes     Endometrial adenocarcinoma 05/04/2002    stage 1B grade 1    Essential hypertension 01/07/2019    Mixed hyperlipidemia 01/07/2019    Obstructive sleep apnea 08/09/2019    Statin intolerance 01/07/2019       Past Surgical History:   Procedure  "Laterality Date    CARDIAC CATHETERIZATION      GASTRIC BYPASS  2005    HYSTERECTOMY      LEFT HEART CATHETERIZATION Left 12/14/2018    Procedure: CATHETERIZATION, HEART, LEFT;  Surgeon: Alexis Smith MD;  Location: [facility] CATH LAB;  Service: Cardiology;  Laterality: Left;    TOTAL ABDOMINAL HYSTERECTOMY W/ BILATERAL SALPINGOOPHORECTOMY  05/04/2022    ENDOMETRIAL ADENOCARCINOMA    TOTAL KNEE ARTHROPLASTY Right 08/22/2019       Social History     Tobacco Use    Smoking status: Never Smoker    Smokeless tobacco: Never Used   Substance Use Topics    Alcohol use: No    Drug use: No       Family History   Problem Relation Age of Onset    Hypertension Mother     Alzheimer's disease Father     Breast cancer Sister         NEGATIVE    Lung cancer Maternal Grandfather     Lung cancer Paternal Grandfather        Current Outpatient Medications   Medication Sig    amLODIPine (NORVASC) 2.5 MG tablet Take 1 tablet (2.5 mg total) by mouth once daily.    apixaban (ELIQUIS) 5 mg Tab Take 1 tablet (5 mg total) by mouth 2 (two) times a day.    BD ULTRA-FINE SHORT PEN NEEDLE 31 gauge x 5/16" Ndle U UTD BID    busPIRone (BUSPAR) 10 MG tablet Take 10 mg by mouth 2 (two) times daily.    carBAMazepine (TEGRETOL) 200 mg tablet Take 200 mg by mouth every evening. Takes one and one-half tablet at night    cyanocobalamin, vitamin B-12, (VITAMIN B-12) 2,500 mcg Subl Place 2,500 mcg under the tongue once daily.    cyanocobalamin-cobamamide 5,000-100 mcg Lozg     docusate sodium (COLACE) 100 MG capsule Take 100 mg by mouth every evening.    ezetimibe (ZETIA) 10 mg tablet Take 1 tablet (10 mg total) by mouth once daily.    ferrous sulfate (FEOSOL) 325 mg (65 mg iron) Tab tablet Take 325 mg by mouth every evening.    lancets 28 gauge Misc USE 1 EACH TWICE DAILY    latanoprost 0.005 % ophthalmic solution     metoprolol tartrate (LOPRESSOR) 25 MG tablet TAKE 1/2 TABLET(12.5 MG) BY MOUTH TWICE DAILY    nateglinide " "(STARLIX) 60 MG tablet     OZEMPIC 1 mg/dose (4 mg/3 mL) 2 mg.    PREVIDENT 5000 BOOSTER PLUS 1.1 % Pste SMARTSIG:To Teeth    semaglutide (OZEMPIC) 1 mg/dose (4 mg/3 mL) Inject 1 mg into the skin.    timolol maleate 0.5% (TIMOPTIC) 0.5 % Drop INT 1 GTT IN OU BID    timolol maleate 0.5% (TIMOPTIC) 0.5 % Drop Place 1 drop into both eyes 2 (two) times daily.    turmeric, bulk, 95 % Powd by Misc.(Non-Drug; Combo Route) route.    valsartan-hydrochlorothiazide (DIOVAN-HCT) 80-12.5 mg per tablet Take 1 tablet by mouth once daily.    vit A-vit C-zinc-propolis 15 mg Lozg Take by mouth.    multivitamin Chew Take by mouth.     No current facility-administered medications for this visit.     Current Outpatient Medications on File Prior to Visit   Medication Sig    amLODIPine (NORVASC) 2.5 MG tablet Take 1 tablet (2.5 mg total) by mouth once daily.    apixaban (ELIQUIS) 5 mg Tab Take 1 tablet (5 mg total) by mouth 2 (two) times a day.    BD ULTRA-FINE SHORT PEN NEEDLE 31 gauge x 5/16" Ndle U UTD BID    busPIRone (BUSPAR) 10 MG tablet Take 10 mg by mouth 2 (two) times daily.    carBAMazepine (TEGRETOL) 200 mg tablet Take 200 mg by mouth every evening. Takes one and one-half tablet at night    cyanocobalamin, vitamin B-12, (VITAMIN B-12) 2,500 mcg Subl Place 2,500 mcg under the tongue once daily.    cyanocobalamin-cobamamide 5,000-100 mcg Lozg     docusate sodium (COLACE) 100 MG capsule Take 100 mg by mouth every evening.    ezetimibe (ZETIA) 10 mg tablet Take 1 tablet (10 mg total) by mouth once daily.    ferrous sulfate (FEOSOL) 325 mg (65 mg iron) Tab tablet Take 325 mg by mouth every evening.    lancets 28 gauge Misc USE 1 EACH TWICE DAILY    latanoprost 0.005 % ophthalmic solution     metoprolol tartrate (LOPRESSOR) 25 MG tablet TAKE 1/2 TABLET(12.5 MG) BY MOUTH TWICE DAILY    nateglinide (STARLIX) 60 MG tablet     OZEMPIC 1 mg/dose (4 mg/3 mL) 2 mg.    PREVIDENT 5000 BOOSTER PLUS 1.1 % Pste " SMARTSIG:To Teeth    semaglutide (OZEMPIC) 1 mg/dose (4 mg/3 mL) Inject 1 mg into the skin.    timolol maleate 0.5% (TIMOPTIC) 0.5 % Drop INT 1 GTT IN OU BID    timolol maleate 0.5% (TIMOPTIC) 0.5 % Drop Place 1 drop into both eyes 2 (two) times daily.    turmeric, bulk, 95 % Powd by Misc.(Non-Drug; Combo Route) route.    valsartan-hydrochlorothiazide (DIOVAN-HCT) 80-12.5 mg per tablet Take 1 tablet by mouth once daily.    vit A-vit C-zinc-propolis 15 mg Lozg Take by mouth.    multivitamin Chew Take by mouth.     No current facility-administered medications on file prior to visit.     Review of patient's allergies indicates:   Allergen Reactions    Lipitor [atorvastatin]      Review of Systems   Constitutional: Negative for diaphoresis, malaise/fatigue and weight gain.   HENT: Negative for hoarse voice.    Eyes: Negative for double vision and visual disturbance.   Cardiovascular: Negative for chest pain, claudication, cyanosis, dyspnea on exertion, irregular heartbeat, leg swelling, near-syncope, orthopnea, palpitations, paroxysmal nocturnal dyspnea and syncope.   Respiratory: Negative for cough, hemoptysis, shortness of breath and snoring.    Hematologic/Lymphatic: Negative for bleeding problem. Does not bruise/bleed easily.   Skin: Negative for color change and poor wound healing.   Musculoskeletal: Negative for muscle cramps, muscle weakness and myalgias.   Gastrointestinal: Negative for bloating, abdominal pain, change in bowel habit, diarrhea, heartburn, hematemesis, hematochezia, melena and nausea.   Neurological: Negative for excessive daytime sleepiness, dizziness, headaches, light-headedness, loss of balance, numbness and weakness.   Psychiatric/Behavioral: Negative for memory loss. The patient does not have insomnia.    Allergic/Immunologic: Negative for hives.       Objective:   Physical Exam  Vitals and nursing note reviewed.   Constitutional:       General: She is not in acute distress.      "Appearance: Normal appearance. She is well-developed. She is obese. She is not ill-appearing.   HENT:      Head: Normocephalic and atraumatic.   Eyes:      General: No scleral icterus.     Pupils: Pupils are equal, round, and reactive to light.   Neck:      Thyroid: No thyromegaly.      Vascular: Normal carotid pulses. No carotid bruit, hepatojugular reflux or JVD.      Trachea: No tracheal deviation.   Cardiovascular:      Rate and Rhythm: Normal rate and regular rhythm.      Pulses: Normal pulses.      Heart sounds: Normal heart sounds. No murmur heard.    No friction rub. No gallop.   Pulmonary:      Effort: Pulmonary effort is normal. No respiratory distress.      Breath sounds: Normal breath sounds. No wheezing, rhonchi or rales.   Chest:      Chest wall: No tenderness.   Abdominal:      General: Bowel sounds are normal. There is no abdominal bruit.      Palpations: Abdomen is soft. There is no hepatomegaly or pulsatile mass.      Tenderness: There is no abdominal tenderness.   Musculoskeletal:      Right shoulder: No deformity.      Cervical back: Normal range of motion and neck supple.      Right lower leg: No edema.      Left lower leg: No edema.   Skin:     General: Skin is warm and dry.      Findings: No erythema or rash.      Nails: There is no clubbing.   Neurological:      Mental Status: She is alert and oriented to person, place, and time.      Cranial Nerves: No cranial nerve deficit.      Coordination: Coordination normal.   Psychiatric:         Speech: Speech normal.         Behavior: Behavior normal.         Judgment: Judgment normal.       Vitals:    08/03/22 1051   BP: 126/82   BP Location: Right arm   Patient Position: Sitting   BP Method: Large (Manual)   Pulse: 77   Resp: 16   SpO2: 98%   Weight: 108 kg (238 lb)   Height: 5' 4" (1.626 m)     Lab Results   Component Value Date    CHOL 165 07/25/2019    CHOL 175 04/04/2019    CHOL 173 12/13/2018     Lab Results   Component Value Date    HDL 46 " 07/25/2019    HDL 48 04/04/2019    HDL 42 12/13/2018     Lab Results   Component Value Date    LDLCALC 95.2 07/25/2019    LDLCALC 101.4 04/04/2019    LDLCALC 100.4 12/13/2018     Lab Results   Component Value Date    TRIG 119 07/25/2019    TRIG 128 04/04/2019    TRIG 153 (H) 12/13/2018     Lab Results   Component Value Date    CHOLHDL 27.9 07/25/2019    CHOLHDL 27.4 04/04/2019    CHOLHDL 24.3 12/13/2018       Chemistry        Component Value Date/Time     07/25/2019 0801    K 4.1 07/25/2019 0801     07/25/2019 0801    CO2 25 07/25/2019 0801    BUN 19 07/25/2019 0801    CREATININE 1.0 07/25/2019 0801     (H) 07/25/2019 0801        Component Value Date/Time    CALCIUM 9.6 07/25/2019 0801    ALKPHOS 93 07/25/2019 0801    AST 17 07/25/2019 0801    ALT 18 07/25/2019 0801    BILITOT 0.3 07/25/2019 0801    ESTGFRAFRICA >60.0 07/25/2019 0801    EGFRNONAA >60.0 07/25/2019 0801        Lab Results   Component Value Date    HGBA1C 7.1 (H) 07/25/2019     Related to Lipid panel  Component 07/26/22 01/13/22 05/27/20 02/13/19 01/12/18 10/20/16   Cholesterol, Total 160 147 158 156 182 143   Triglycerides 211 122 100 147 166 High  80   HDL 45 46 Low  44 Low  42 Low  55 39 Low    LDL Calculated 73 77 94 85 94 88   Total Chol / HDL Ratio 3.56 3.20 3.59 3.71 3.31 3.67   Non HDL Cholesterol 115.00 -- -- -- -- --     Related to Hemoglobin A1c  Component 07/26/22 01/13/22 12/15/20 05/27/20 12/09/19 08/13/19   Hgb A1c 8.5 8.1 High  7.7 High  6.8 High  6.4 High  7.0 High    EAG (mg/dl) 197.05 183.73 170.41 140.44 127.12 147.10     Glucose <100 mg/dL 266 High     BUN 6 - 22 mg/dL 14    Creatinine, Ser 0.50 - 1.20 mg/dL 0.72    Total Bilirubin 0.2 - 1.1 mg/dL 0.5    Alkaline Phosphatase 42.0 - 121.0 IU/L 103.0    AST 10 - 42 IU/L 12    ALT 10 - 60 IU/L 19    Calcium 8.4 - 10.5 mg/dL 9.5    Sodium 134 - 146 meq/L 140    Potassium 3.60 - 5.30 meq/L 4.20    Chloride 98.0 - 111.0 meq/dL 103.0    CO2 Total 20 - 34 meq/L 28     Total Protein 6.1 - 8.5 g/dL 6.3    Albumin, Ser 3.2 - 5.5 g/dL 4.1    EGFR mL/min/1.73m^2 92    Note:  Reported eGFR is based on the CKD-EPI 2021 equation that does not use a race coefficient.     Reference Range:   >=60 mL/min/1.73m^2       NKF KDOQI and KDIGO guidelines recommend confirming eGFR of 45-59 mL/min/1.73m^2 with uACR <30 mg/g based on eGFR calculated using both creatinine and cystatin C.   Resulting Agency  [facility]     Narrative  Performed by [facility]  Release to patient->Automatic Release   Glucose: Fasting Normal Range  Specimen Collected: 07/26/22 10:56 AM Last Resulted: 07/26/22 12:22 PM   Received From: [facility]  Result Received: 08/03/22 10:36 AM     Specimen:  Blood - Venous structure (body structure)   Ref Range & Units 8 d ago   White Blood Cell Count 4.0 - 11.0 K/uL 8.6    Red Blood Cell Count 4.20 - 5.40 M/uL 4.90    Hemoglobin 12.0 - 16.0 g/dL 14.7    Hematocrit 37.0 - 47.0 % 42.1    Mean Corpuscular Volume 80.0 - 100.0 fL 85.8    MEAN CORPUSCULAR HEMOGLOBIN 27.0 - 31.0 pg 30.0    Mean Corpuscular Hemoglobin Conc 31.0 - 37.0 g/dL 34.9    Red Cell Distribution Width 11.6 - 14.8 % 13.2    Platelet Count 150 - 450 K/uL 233    Neutrophils 37.0 - 80.0 % 75.3    Lymphs 10.0 - 50.0 % 18.8    Monocytes 0.0 - 15.0 % 3.9    Eos 0.0 - 7.0 % 1.7    Basos 0.0 - 5.0 % 0.3    Neutrophils Abs 2.0 - 8.0 10^3/uL 6.5    Lymphocytes Abs 0.6 - 3.8 10^3/uL 1.6    Monocytes Abs 0.0 - 1.5 10^3/uL 0.3    Eosinophils Abs 0.0 - 0.7 10^3/uL 0.1    Basophils Abs 0.0 - 0.2 10^3/uL 0.0    Nucleated RBC  0.2    Resulting Agency  [facility]       Component 07/26/22 01/13/22 05/27/20 02/13/19 01/12/18 10/20/16   TSH 1.92 1.55 2.32 2.05 1.98 1.89            Lab Results   Component Value Date    INR 1.0 12/13/2018    INR 1.0 08/17/2016     Lab Results   Component Value Date    WBC 10.71  12/14/2018    HGB 11.9 (L) 12/14/2018    HCT 37.1 12/14/2018    MCV 78 (L) 12/14/2018     12/14/2018     BMP  Sodium   Date Value Ref Range Status   07/25/2019 139 136 - 145 mmol/L Final     Potassium   Date Value Ref Range Status   07/25/2019 4.1 3.5 - 5.1 mmol/L Final     Chloride   Date Value Ref Range Status   07/25/2019 106 95 - 110 mmol/L Final     CO2   Date Value Ref Range Status   07/25/2019 25 23 - 29 mmol/L Final     BUN   Date Value Ref Range Status   07/25/2019 19 8 - 23 mg/dL Final     Creatinine   Date Value Ref Range Status   07/25/2019 1.0 0.5 - 1.4 mg/dL Final     Calcium   Date Value Ref Range Status   07/25/2019 9.6 8.7 - 10.5 mg/dL Final     Anion Gap   Date Value Ref Range Status   07/25/2019 8 8 - 16 mmol/L Final     eGFR if    Date Value Ref Range Status   07/25/2019 >60.0 >60 mL/min/1.73 m^2 Final     eGFR if non    Date Value Ref Range Status   07/25/2019 >60.0 >60 mL/min/1.73 m^2 Final     Comment:     Calculation used to obtain the estimated glomerular filtration  rate (eGFR) is the CKD-EPI equation.        CrCl cannot be calculated (Patient's most recent lab result is older than the maximum 7 days allowed.).    Assessment:     1. Cardiomyopathy, dilated    2. Abnormal echocardiogram    3. Essential hypertension    4. Mild concentric left ventricular hypertrophy (LVH)    5. Mixed hyperlipidemia    6. PAF (paroxysmal atrial fibrillation)    7. History of COVID-19    8. Class 3 severe obesity due to excess calories with serious comorbidity and body mass index (BMI) of 40.0 to 44.9 in adult    9. Type 2 diabetes mellitus without complication, without long-term current use of insulin    10. Statin intolerance    11. INDIRA on CPAP      indira compliant with cpap continue same   htn controlled low salt diet advised continued weight loss  Diabetes uncontrolled counseled about appropriate diet being addressed by pcp and diabetes education  Mixed lipidemia on  target except for triglycerides secondary to elevated a1c counseled about compliance weight loss.   paf none clinically on appropriate therapy and cva prophylaxis.   Cardiomyopathy with lv recovery back to normal asymptomatic.   Plan:   Continue current therapy  Cardiac low salt diet.  Risk factor modification and excercise program./weight loss  F/u in 6 months with lipid cmp a1c.

## 2023-02-09 ENCOUNTER — OFFICE VISIT (OUTPATIENT)
Dept: CARDIOLOGY | Facility: CLINIC | Age: 68
End: 2023-02-09

## 2023-02-09 VITALS
HEIGHT: 64 IN | WEIGHT: 222.25 LBS | SYSTOLIC BLOOD PRESSURE: 112 MMHG | OXYGEN SATURATION: 99 % | HEART RATE: 62 BPM | BODY MASS INDEX: 37.94 KG/M2 | DIASTOLIC BLOOD PRESSURE: 70 MMHG

## 2023-02-09 DIAGNOSIS — E78.2 MIXED HYPERLIPIDEMIA: ICD-10-CM

## 2023-02-09 DIAGNOSIS — G47.33 OSA ON CPAP: Chronic | ICD-10-CM

## 2023-02-09 DIAGNOSIS — I48.0 PAF (PAROXYSMAL ATRIAL FIBRILLATION): ICD-10-CM

## 2023-02-09 DIAGNOSIS — I51.7 MILD CONCENTRIC LEFT VENTRICULAR HYPERTROPHY (LVH): ICD-10-CM

## 2023-02-09 DIAGNOSIS — Z78.9 STATIN INTOLERANCE: ICD-10-CM

## 2023-02-09 DIAGNOSIS — E66.01 CLASS 3 SEVERE OBESITY DUE TO EXCESS CALORIES WITH SERIOUS COMORBIDITY AND BODY MASS INDEX (BMI) OF 40.0 TO 44.9 IN ADULT: ICD-10-CM

## 2023-02-09 DIAGNOSIS — I10 ESSENTIAL HYPERTENSION: ICD-10-CM

## 2023-02-09 DIAGNOSIS — E11.9 TYPE 2 DIABETES MELLITUS WITHOUT COMPLICATION, WITHOUT LONG-TERM CURRENT USE OF INSULIN: ICD-10-CM

## 2023-02-09 DIAGNOSIS — I42.0 CARDIOMYOPATHY, DILATED: Primary | ICD-10-CM

## 2023-02-09 DIAGNOSIS — R93.1 ABNORMAL ECHOCARDIOGRAM: ICD-10-CM

## 2023-02-09 PROCEDURE — 99999 PR PBB SHADOW E&M-EST. PATIENT-LVL III: ICD-10-PCS | Mod: PBBFAC,,, | Performed by: INTERNAL MEDICINE

## 2023-02-09 PROCEDURE — 99999 PR PBB SHADOW E&M-EST. PATIENT-LVL III: CPT | Mod: PBBFAC,,, | Performed by: INTERNAL MEDICINE

## 2023-02-09 PROCEDURE — 99212 PR OFFICE/OUTPT VISIT, EST, LEVL II, 10-19 MIN: ICD-10-PCS | Mod: S$PBB,,, | Performed by: INTERNAL MEDICINE

## 2023-02-09 PROCEDURE — 99212 OFFICE O/P EST SF 10 MIN: CPT | Mod: S$PBB,,, | Performed by: INTERNAL MEDICINE

## 2023-02-09 RX ORDER — VALSARTAN AND HYDROCHLOROTHIAZIDE 80; 12.5 MG/1; MG/1
1 TABLET, FILM COATED ORAL DAILY
Qty: 90 TABLET | Refills: 3 | Status: SHIPPED | OUTPATIENT
Start: 2023-02-09 | End: 2024-01-29

## 2023-02-09 RX ORDER — METOPROLOL TARTRATE 25 MG/1
12.5 TABLET, FILM COATED ORAL 2 TIMES DAILY
Qty: 90 TABLET | Refills: 3 | Status: SHIPPED | OUTPATIENT
Start: 2023-02-09 | End: 2024-01-29

## 2023-02-09 NOTE — PROGRESS NOTES
Subjective:   Patient ID:  Keysha Dewey is a 67 y.o. female who presents for follow up of No chief complaint on file.      HPI  HPI12/15/2020 FRom Geremias PEDRAZA  Ms. Dewey' current medical conditions include PAF (on Eliquis), INDIRA,  bipolar disorder, dilated CMPY, DM, HTN, hyperlipidemia, and statin intolerance. She has a history of gastric bypass 2005 and hysterectomy in 2002. Had knee replacement in Aug 2019 and Dec 2019.   Started wearing CPAP in June 2020.      Has recovered and able to ride her stationary bike 30 min most days       Echo in 2018 revealed LVEF 35%. Underwent LHC in December 2018 to evaluate for CAD as cause of LV decline. LHC revealed normal coronary arteries. Started on medical therapy and repeat echo in January 2019 revealed recovered LVEF to 60%.      Has no complaints today. Denies any chest pain, SOB, COHN,  orthopnea, PND, dizziness, palpitations,  near syncope, syncope or edema . Has no symptoms concerning for angina or equivalent. No CNS Complaints to suggest TIA or CVA. Does well with limiting sodium intake.     9/16/2021    SHE IS EXERCISING AT HOME ON HER MACHINE AT HOME. HAS NO CHEST PAIN OR SHORTNESS OF BREATH . HAS HAD KNEE REPLACEMENT HAS DONE WELL WITH IT. HAS BEEN VERY ACTIVE COMPLIANT WITH DIET AND MEDS      8/3/2022  Exercises 20 minutes daily 6 days a week using a bike exercise. She stretches also her diabetes is uncontrolled is being addressed by pc. Her lipids on target she is going to diabetes education. Has no cardiovascular symptoms. Using cpap regularily htn reasonnable controlled     2/9/2023  Walks regularily compliant with diet using ozempic no cardiac symptoms.reviewed labs a1c still elevated but trending down.  Past Medical History:   Diagnosis Date    Atrial fibrillation     Bipolar disorder     Cardiomyopathy, dilated 12/14/2018    Diabetes     Endometrial adenocarcinoma 05/04/2002    stage 1B grade 1    Essential hypertension 01/07/2019    Mixed hyperlipidemia  "01/07/2019    Obstructive sleep apnea 08/09/2019    Statin intolerance 01/07/2019       Past Surgical History:   Procedure Laterality Date    CARDIAC CATHETERIZATION      GASTRIC BYPASS  2005    HYSTERECTOMY      LEFT HEART CATHETERIZATION Left 12/14/2018    Procedure: CATHETERIZATION, HEART, LEFT;  Surgeon: Alexis Smith MD;  Location: [facility] CATH LAB;  Service: Cardiology;  Laterality: Left;    TOTAL ABDOMINAL HYSTERECTOMY W/ BILATERAL SALPINGOOPHORECTOMY  05/04/2022    ENDOMETRIAL ADENOCARCINOMA    TOTAL KNEE ARTHROPLASTY Right 08/22/2019       Social History     Tobacco Use    Smoking status: Never    Smokeless tobacco: Never   Substance Use Topics    Alcohol use: No    Drug use: No       Family History   Problem Relation Age of Onset    Hypertension Mother     Alzheimer's disease Father     Breast cancer Sister         NEGATIVE    Lung cancer Maternal Grandfather     Lung cancer Paternal Grandfather        Current Outpatient Medications   Medication Sig    amLODIPine (NORVASC) 2.5 MG tablet TAKE 1 TABLET(2.5 MG) BY MOUTH EVERY DAY    BD ULTRA-FINE SHORT PEN NEEDLE 31 gauge x 5/16" Ndle U UTD BID    busPIRone (BUSPAR) 10 MG tablet Take 10 mg by mouth 2 (two) times daily.    carBAMazepine (TEGRETOL) 200 mg tablet Take 200 mg by mouth every evening. Takes one and one-half tablet at night    cyanocobalamin, vitamin B-12, (VITAMIN B-12) 2,500 mcg Subl Place 2,500 mcg under the tongue once daily.    cyanocobalamin-cobamamide 5,000-100 mcg Lozg     docusate sodium (COLACE) 100 MG capsule Take 100 mg by mouth every evening.    ELIQUIS 5 mg Tab TAKE 1 TABLET(5 MG) BY MOUTH TWICE DAILY    ezetimibe (ZETIA) 10 mg tablet Take 1 tablet (10 mg total) by mouth once daily.    ferrous sulfate (FEOSOL) 325 mg (65 mg iron) Tab tablet Take 325 mg by mouth every evening.    lancets 28 gauge Misc USE 1 EACH TWICE DAILY    latanoprost 0.005 % ophthalmic solution     metoprolol tartrate (LOPRESSOR) 25 MG tablet TAKE 1/2 " "TABLET(12.5 MG) BY MOUTH TWICE DAILY    multivitamin Chew Take by mouth.    nateglinide (STARLIX) 60 MG tablet     OZEMPIC 1 mg/dose (4 mg/3 mL) 2 mg.    PREVIDENT 5000 BOOSTER PLUS 1.1 % Pste SMARTSIG:To Teeth    semaglutide (OZEMPIC) 1 mg/dose (4 mg/3 mL) Inject 1 mg into the skin.    timolol maleate 0.5% (TIMOPTIC) 0.5 % Drop INT 1 GTT IN OU BID    timolol maleate 0.5% (TIMOPTIC) 0.5 % Drop Place 1 drop into both eyes 2 (two) times daily.    turmeric, bulk, 95 % Powd by Misc.(Non-Drug; Combo Route) route.    valsartan-hydrochlorothiazide (DIOVAN-HCT) 80-12.5 mg per tablet TAKE 1 TABLET BY MOUTH EVERY DAY    vit A-vit C-zinc-propolis 15 mg Lozg Take by mouth.     No current facility-administered medications for this visit.     Current Outpatient Medications on File Prior to Visit   Medication Sig    amLODIPine (NORVASC) 2.5 MG tablet TAKE 1 TABLET(2.5 MG) BY MOUTH EVERY DAY    BD ULTRA-FINE SHORT PEN NEEDLE 31 gauge x 5/16" Ndle U UTD BID    busPIRone (BUSPAR) 10 MG tablet Take 10 mg by mouth 2 (two) times daily.    carBAMazepine (TEGRETOL) 200 mg tablet Take 200 mg by mouth every evening. Takes one and one-half tablet at night    cyanocobalamin, vitamin B-12, (VITAMIN B-12) 2,500 mcg Subl Place 2,500 mcg under the tongue once daily.    cyanocobalamin-cobamamide 5,000-100 mcg Lozg     docusate sodium (COLACE) 100 MG capsule Take 100 mg by mouth every evening.    ELIQUIS 5 mg Tab TAKE 1 TABLET(5 MG) BY MOUTH TWICE DAILY    ezetimibe (ZETIA) 10 mg tablet Take 1 tablet (10 mg total) by mouth once daily.    ferrous sulfate (FEOSOL) 325 mg (65 mg iron) Tab tablet Take 325 mg by mouth every evening.    lancets 28 gauge Misc USE 1 EACH TWICE DAILY    latanoprost 0.005 % ophthalmic solution     metoprolol tartrate (LOPRESSOR) 25 MG tablet TAKE 1/2 TABLET(12.5 MG) BY MOUTH TWICE DAILY    multivitamin Chew Take by mouth.    nateglinide (STARLIX) 60 MG tablet     OZEMPIC 1 mg/dose (4 mg/3 mL) 2 mg.    PREVIDENT 5000 BOOSTER " PLUS 1.1 % Pste SMARTSIG:To Teeth    semaglutide (OZEMPIC) 1 mg/dose (4 mg/3 mL) Inject 1 mg into the skin.    timolol maleate 0.5% (TIMOPTIC) 0.5 % Drop INT 1 GTT IN OU BID    timolol maleate 0.5% (TIMOPTIC) 0.5 % Drop Place 1 drop into both eyes 2 (two) times daily.    turmeric, bulk, 95 % Powd by Misc.(Non-Drug; Combo Route) route.    valsartan-hydrochlorothiazide (DIOVAN-HCT) 80-12.5 mg per tablet TAKE 1 TABLET BY MOUTH EVERY DAY    vit A-vit C-zinc-propolis 15 mg Lozg Take by mouth.     No current facility-administered medications on file prior to visit.     Review of patient's allergies indicates:   Allergen Reactions    Lipitor [atorvastatin]       Review of Systems   Constitutional: Negative for diaphoresis, malaise/fatigue and weight gain.   HENT:  Negative for hoarse voice.    Eyes:  Negative for double vision and visual disturbance.   Cardiovascular:  Negative for chest pain, claudication, cyanosis, dyspnea on exertion, irregular heartbeat, leg swelling, near-syncope, orthopnea, palpitations, paroxysmal nocturnal dyspnea and syncope.   Respiratory:  Negative for cough, hemoptysis, shortness of breath and snoring.    Hematologic/Lymphatic: Negative for bleeding problem. Does not bruise/bleed easily.   Skin:  Negative for color change and poor wound healing.   Musculoskeletal:  Negative for muscle cramps, muscle weakness and myalgias.   Gastrointestinal:  Negative for bloating, abdominal pain, change in bowel habit, diarrhea, heartburn, hematemesis, hematochezia, melena and nausea.   Neurological:  Negative for excessive daytime sleepiness, dizziness, headaches, light-headedness, loss of balance, numbness and weakness.   Psychiatric/Behavioral:  Negative for memory loss. The patient does not have insomnia.    Allergic/Immunologic: Negative for hives.     Objective:   Physical Exam  Constitutional:       General: She is not in acute distress.     Appearance: Normal appearance. She is well-developed. She is  "obese. She is not ill-appearing.   HENT:      Head: Normocephalic and atraumatic.   Eyes:      General: No scleral icterus.     Pupils: Pupils are equal, round, and reactive to light.   Neck:      Thyroid: No thyromegaly.      Vascular: Normal carotid pulses. No carotid bruit, hepatojugular reflux or JVD.      Trachea: No tracheal deviation.   Cardiovascular:      Rate and Rhythm: Normal rate and regular rhythm.      Pulses: Normal pulses.      Heart sounds: Normal heart sounds. No murmur heard.    No friction rub. No gallop.   Pulmonary:      Effort: Pulmonary effort is normal. No respiratory distress.      Breath sounds: Normal breath sounds. No wheezing, rhonchi or rales.   Chest:      Chest wall: No tenderness.   Abdominal:      General: Bowel sounds are normal. There is no abdominal bruit.      Palpations: Abdomen is soft. There is no hepatomegaly or pulsatile mass.      Tenderness: There is no abdominal tenderness.   Musculoskeletal:      Right shoulder: No deformity.      Cervical back: Normal range of motion and neck supple.      Right lower leg: No edema.      Left lower leg: No edema.   Skin:     General: Skin is warm and dry.      Findings: No erythema or rash.      Nails: There is no clubbing.   Neurological:      Mental Status: She is alert and oriented to person, place, and time.      Cranial Nerves: No cranial nerve deficit.      Coordination: Coordination normal.   Psychiatric:         Speech: Speech normal.         Behavior: Behavior normal.         Thought Content: Thought content normal.     Vitals:    02/09/23 1014   BP: 112/70   Pulse: 62   SpO2: 99%   Weight: 100.8 kg (222 lb 3.6 oz)   Height: 5' 4" (1.626 m)     Lab Results   Component Value Date    CHOL 165 07/25/2019    CHOL 175 04/04/2019    CHOL 173 12/13/2018     Lab Results   Component Value Date    HDL 46 07/25/2019    HDL 48 04/04/2019    HDL 42 12/13/2018     Lab Results   Component Value Date    LDLCALC 95.2 07/25/2019    LDLCALC " 101.4 04/04/2019    LDLCALC 100.4 12/13/2018     Lab Results   Component Value Date    TRIG 119 07/25/2019    TRIG 128 04/04/2019    TRIG 153 (H) 12/13/2018     Lab Results   Component Value Date    CHOLHDL 27.9 07/25/2019    CHOLHDL 27.4 04/04/2019    CHOLHDL 24.3 12/13/2018       Chemistry        Component Value Date/Time     07/25/2019 0801    K 4.1 07/25/2019 0801     07/25/2019 0801    CO2 25 07/25/2019 0801    BUN 19 07/25/2019 0801    CREATININE 1.0 07/25/2019 0801     (H) 07/25/2019 0801        Component Value Date/Time    CALCIUM 9.6 07/25/2019 0801    ALKPHOS 93 07/25/2019 0801    AST 17 07/25/2019 0801    ALT 18 07/25/2019 0801    BILITOT 0.3 07/25/2019 0801    ESTGFRAFRICA >60.0 07/25/2019 0801    EGFRNONAA >60.0 07/25/2019 0801        Lab Results   Component Value Date    HGBA1C 7.1 (H) 07/25/2019       No results found for: TSH  Lab Results   Component Value Date    INR 1.0 12/13/2018    INR 1.0 08/17/2016     Lab Results   Component Value Date    WBC 10.71 12/14/2018    HGB 11.9 (L) 12/14/2018    HCT 37.1 12/14/2018    MCV 78 (L) 12/14/2018     12/14/2018     BMP  Sodium   Date Value Ref Range Status   07/25/2019 139 136 - 145 mmol/L Final     Potassium   Date Value Ref Range Status   07/25/2019 4.1 3.5 - 5.1 mmol/L Final     Chloride   Date Value Ref Range Status   07/25/2019 106 95 - 110 mmol/L Final     CO2   Date Value Ref Range Status   07/25/2019 25 23 - 29 mmol/L Final     BUN   Date Value Ref Range Status   07/25/2019 19 8 - 23 mg/dL Final     Creatinine   Date Value Ref Range Status   07/25/2019 1.0 0.5 - 1.4 mg/dL Final     Calcium   Date Value Ref Range Status   07/25/2019 9.6 8.7 - 10.5 mg/dL Final     Anion Gap   Date Value Ref Range Status   07/25/2019 8 8 - 16 mmol/L Final     eGFR if    Date Value Ref Range Status   07/25/2019 >60.0 >60 mL/min/1.73 m^2 Final     eGFR if non    Date Value Ref Range Status   07/25/2019 >60.0 >60  mL/min/1.73 m^2 Final     Comment:     Calculation used to obtain the estimated glomerular filtration  rate (eGFR) is the CKD-EPI equation.        CrCl cannot be calculated (Patient's most recent lab result is older than the maximum 7 days allowed.).    Assessment:     1. Cardiomyopathy, dilated    2. Abnormal echocardiogram    3. Essential hypertension    4. Mild concentric left ventricular hypertrophy (LVH)    5. Mixed hyperlipidemia    6. PAF (paroxysmal atrial fibrillation)    7. Class 3 severe obesity due to excess calories with serious comorbidity and body mass index (BMI) of 40.0 to 44.9 in adult    8. Type 2 diabetes mellitus without complication, without long-term current use of insulin    9. INDIRA on CPAP    10. Statin intolerance      Status quo tried to be compliant with diet and exercise and lose weight. No arrythmias clinically and noc hf compliant tolerated meds well will continue same./ she will seek care out of [facility] DUE TO INSURANCE LOGISTICS.  Plan:   Continue current therapy  Cardiac low salt diet.  Risk factor modification and excercise program./weight lo  F/u prn

## 2024-01-27 DIAGNOSIS — I10 ESSENTIAL HYPERTENSION: ICD-10-CM

## 2024-01-27 DIAGNOSIS — I48.0 PAF (PAROXYSMAL ATRIAL FIBRILLATION): ICD-10-CM

## 2024-01-27 DIAGNOSIS — Z78.9 STATIN INTOLERANCE: ICD-10-CM

## 2024-01-29 RX ORDER — METOPROLOL TARTRATE 25 MG/1
12.5 TABLET, FILM COATED ORAL 2 TIMES DAILY
Qty: 90 TABLET | Refills: 3 | Status: SHIPPED | OUTPATIENT
Start: 2024-01-29

## 2024-01-29 RX ORDER — VALSARTAN AND HYDROCHLOROTHIAZIDE 80; 12.5 MG/1; MG/1
1 TABLET, FILM COATED ORAL
Qty: 90 TABLET | Refills: 3 | Status: SHIPPED | OUTPATIENT
Start: 2024-01-29

## 2024-01-29 RX ORDER — APIXABAN 5 MG/1
TABLET, FILM COATED ORAL
Qty: 180 TABLET | Refills: 3 | Status: SHIPPED | OUTPATIENT
Start: 2024-01-29 | End: 2024-01-31 | Stop reason: SDUPTHER

## 2024-01-31 DIAGNOSIS — I48.0 PAF (PAROXYSMAL ATRIAL FIBRILLATION): ICD-10-CM

## 2024-04-03 ENCOUNTER — PATIENT MESSAGE (OUTPATIENT)
Dept: PULMONOLOGY | Facility: CLINIC | Age: 69
End: 2024-04-03

## 2024-07-07 ENCOUNTER — HOSPITAL ENCOUNTER (EMERGENCY)
Facility: HOSPITAL | Age: 69
Discharge: HOME | End: 2024-07-07
Attending: EMERGENCY MEDICINE
Payer: MEDICARE

## 2024-07-07 VITALS
BODY MASS INDEX: 34.55 KG/M2 | OXYGEN SATURATION: 99 % | DIASTOLIC BLOOD PRESSURE: 65 MMHG | RESPIRATION RATE: 18 BRPM | WEIGHT: 201.25 LBS | HEART RATE: 78 BPM | SYSTOLIC BLOOD PRESSURE: 139 MMHG | TEMPERATURE: 98 F

## 2024-07-07 DIAGNOSIS — Z86.79 HISTORY OF ATRIAL FIBRILLATION: ICD-10-CM

## 2024-07-07 DIAGNOSIS — Z79.01 CHRONIC ANTICOAGULATION: ICD-10-CM

## 2024-07-07 DIAGNOSIS — K52.9 COLITIS: Primary | ICD-10-CM

## 2024-07-07 LAB
ABO + RH BLD: NORMAL
ALBUMIN SERPL BCP-MCNC: 3.9 G/DL (ref 3.5–5.2)
ALP SERPL-CCNC: 104 U/L (ref 55–135)
ALT SERPL W/O P-5'-P-CCNC: 44 U/L (ref 10–44)
ANION GAP SERPL CALC-SCNC: 11 MMOL/L (ref 8–16)
APTT PPP: 22.2 SEC (ref 21–32)
AST SERPL-CCNC: 27 U/L (ref 10–40)
BASOPHILS # BLD AUTO: 0.04 K/UL (ref 0–0.2)
BASOPHILS NFR BLD: 0.3 % (ref 0–1.9)
BILIRUB SERPL-MCNC: 0.5 MG/DL (ref 0.1–1)
BLD GP AB SCN CELLS X3 SERPL QL: NORMAL
BUN SERPL-MCNC: 14 MG/DL (ref 8–23)
CALCIUM SERPL-MCNC: 9.2 MG/DL (ref 8.7–10.5)
CHLORIDE SERPL-SCNC: 109 MMOL/L (ref 95–110)
CO2 SERPL-SCNC: 20 MMOL/L (ref 23–29)
CREAT SERPL-MCNC: 0.8 MG/DL (ref 0.5–1.4)
DIFFERENTIAL METHOD BLD: ABNORMAL
EOSINOPHIL # BLD AUTO: 0 K/UL (ref 0–0.5)
EOSINOPHIL NFR BLD: 0.2 % (ref 0–8)
ERYTHROCYTE [DISTWIDTH] IN BLOOD BY AUTOMATED COUNT: 12.3 % (ref 11.5–14.5)
EST. GFR  (NO RACE VARIABLE): >60 ML/MIN/1.73 M^2
GLUCOSE SERPL-MCNC: 164 MG/DL (ref 70–110)
HCT VFR BLD AUTO: 46.9 % (ref 37–48.5)
HCV AB SERPL QL IA: NEGATIVE
HEP C VIRUS HOLD SPECIMEN: NORMAL
HGB BLD-MCNC: 15.7 G/DL (ref 12–16)
HIV 1+2 AB+HIV1 P24 AG SERPL QL IA: NEGATIVE
IMM GRANULOCYTES # BLD AUTO: 0.03 K/UL (ref 0–0.04)
IMM GRANULOCYTES NFR BLD AUTO: 0.2 % (ref 0–0.5)
INR PPP: 1 (ref 0.8–1.2)
LIPASE SERPL-CCNC: 30 U/L (ref 4–60)
LYMPHOCYTES # BLD AUTO: 0.9 K/UL (ref 1–4.8)
LYMPHOCYTES NFR BLD: 7.7 % (ref 18–48)
MCH RBC QN AUTO: 29.6 PG (ref 27–31)
MCHC RBC AUTO-ENTMCNC: 33.5 G/DL (ref 32–36)
MCV RBC AUTO: 88 FL (ref 82–98)
MONOCYTES # BLD AUTO: 0.4 K/UL (ref 0.3–1)
MONOCYTES NFR BLD: 3.6 % (ref 4–15)
NEUTROPHILS # BLD AUTO: 10.7 K/UL (ref 1.8–7.7)
NEUTROPHILS NFR BLD: 88 % (ref 38–73)
NRBC BLD-RTO: 0 /100 WBC
PLATELET # BLD AUTO: 228 K/UL (ref 150–450)
PMV BLD AUTO: 9.6 FL (ref 9.2–12.9)
POTASSIUM SERPL-SCNC: 4 MMOL/L (ref 3.5–5.1)
PROT SERPL-MCNC: 6.6 G/DL (ref 6–8.4)
PROTHROMBIN TIME: 10.8 SEC (ref 9–12.5)
RBC # BLD AUTO: 5.31 M/UL (ref 4–5.4)
SODIUM SERPL-SCNC: 140 MMOL/L (ref 136–145)
SPECIMEN OUTDATE: NORMAL
WBC # BLD AUTO: 12.12 K/UL (ref 3.9–12.7)

## 2024-07-07 PROCEDURE — 86803 HEPATITIS C AB TEST: CPT | Performed by: EMERGENCY MEDICINE

## 2024-07-07 PROCEDURE — 86850 RBC ANTIBODY SCREEN: CPT | Performed by: EMERGENCY MEDICINE

## 2024-07-07 PROCEDURE — 86901 BLOOD TYPING SEROLOGIC RH(D): CPT | Performed by: EMERGENCY MEDICINE

## 2024-07-07 PROCEDURE — 85610 PROTHROMBIN TIME: CPT | Performed by: EMERGENCY MEDICINE

## 2024-07-07 PROCEDURE — 83690 ASSAY OF LIPASE: CPT | Performed by: EMERGENCY MEDICINE

## 2024-07-07 PROCEDURE — 36415 COLL VENOUS BLD VENIPUNCTURE: CPT | Performed by: EMERGENCY MEDICINE

## 2024-07-07 PROCEDURE — 99285 EMERGENCY DEPT VISIT HI MDM: CPT | Mod: 25

## 2024-07-07 PROCEDURE — 25500020 PHARM REV CODE 255: Performed by: EMERGENCY MEDICINE

## 2024-07-07 PROCEDURE — 80053 COMPREHEN METABOLIC PANEL: CPT | Performed by: EMERGENCY MEDICINE

## 2024-07-07 PROCEDURE — 85025 COMPLETE CBC W/AUTO DIFF WBC: CPT | Performed by: EMERGENCY MEDICINE

## 2024-07-07 PROCEDURE — 85730 THROMBOPLASTIN TIME PARTIAL: CPT | Performed by: EMERGENCY MEDICINE

## 2024-07-07 PROCEDURE — 87389 HIV-1 AG W/HIV-1&-2 AB AG IA: CPT | Performed by: EMERGENCY MEDICINE

## 2024-07-07 PROCEDURE — 86900 BLOOD TYPING SEROLOGIC ABO: CPT | Performed by: EMERGENCY MEDICINE

## 2024-07-07 RX ORDER — CIPROFLOXACIN 500 MG/1
500 TABLET ORAL 2 TIMES DAILY
Qty: 14 TABLET | Refills: 0 | Status: SHIPPED | OUTPATIENT
Start: 2024-07-07 | End: 2024-07-14

## 2024-07-07 RX ORDER — METRONIDAZOLE 500 MG/1
500 TABLET ORAL EVERY 12 HOURS
Qty: 14 TABLET | Refills: 0 | Status: SHIPPED | OUTPATIENT
Start: 2024-07-07 | End: 2024-07-14

## 2024-07-07 RX ADMIN — IOHEXOL 100 ML: 350 INJECTION, SOLUTION INTRAVENOUS at 12:07

## 2024-07-07 NOTE — ED PROVIDER NOTES
SCRIBE #1 NOTE: I, Sidra Masters, am scribing for, and in the presence of, Saritha Vasques MD. I have scribed the entire note.       History     Chief Complaint   Patient presents with    Abdominal Pain     Pt c/o abdominal pain that began after eating last night.  She also c/o vomiting, diarrhea, and bright red blood in her stool.     Review of patient's allergies indicates:   Allergen Reactions    Lipitor [atorvastatin]          History of Present Illness     HPI    7/7/2024, 10:24 AM  History obtained from the patient      History of Present Illness: Keysha Dewey is a 68 y.o. female patient with a PMHx of Afib, bipolar disorder, HTN, HLD, endometrial adenocarcinoma, and DM who presents to the Emergency Department for evaluation of 10/10 abdominal pain which onset gradually after eating last night. Pt states that soon after her abdominal cramps started she started having episodes of diarrhea and n/v. ~2AM today, the pt started having bright red blood in her diarrhea and it has not stopped; pt is on Eliquis. Symptoms are constant and moderate in severity. No mitigating or exacerbating factors reported. Associated sxs include abdominal pain (2/10 now), n/v/d, and blood in stool. Patient denies any fever, dysuria, SOB, CP, cough, and all other sxs at this time. No prior tx. Pt mentions that before last night she thought that she was constipated since she had been having very small BM. No further complaints or concerns at this time.       Arrival mode: Personal vehicle    PCP: Tito Carbone MD        Past Medical History:  Past Medical History:   Diagnosis Date    Atrial fibrillation     Bipolar disorder     Cardiomyopathy, dilated 12/14/2018    Diabetes     Endometrial adenocarcinoma 05/04/2002    stage 1B grade 1    Essential hypertension 01/07/2019    Mixed hyperlipidemia 01/07/2019    Obstructive sleep apnea 08/09/2019    Statin intolerance 01/07/2019       Past Surgical History:  Past Surgical History:    Procedure Laterality Date    CARDIAC CATHETERIZATION      GASTRIC BYPASS  2005    LEFT HEART CATHETERIZATION Left 12/14/2018    Procedure: CATHETERIZATION, HEART, LEFT;  Surgeon: Alexis Smith MD;  Location: [facility] CATH LAB;  Service: Cardiology;  Laterality: Left;    TOTAL ABDOMINAL HYSTERECTOMY W/ BILATERAL SALPINGOOPHORECTOMY  05/04/2022    ENDOMETRIAL ADENOCARCINOMA    TOTAL KNEE ARTHROPLASTY Right 08/22/2019         Family History:  Family History   Problem Relation Name Age of Onset    Hypertension Mother      Alzheimer's disease Father      Breast cancer Sister          NEGATIVE    Lung cancer Maternal Grandfather      Lung cancer Paternal Grandfather         Social History:  Social History     Tobacco Use    Smoking status: Never    Smokeless tobacco: Never   Substance and Sexual Activity    Alcohol use: No    Drug use: No    Sexual activity: Not on file        Review of Systems     Review of Systems   Constitutional:  Negative for fever.   HENT:  Negative for sore throat.    Respiratory:  Negative for shortness of breath.    Cardiovascular:  Negative for chest pain.   Gastrointestinal:  Positive for abdominal pain ((10/10 pain last night, 2/10 in ED)), blood in stool (bright red), diarrhea, nausea and vomiting.   Genitourinary:  Negative for dysuria.   Musculoskeletal:  Negative for back pain.   Skin:  Negative for rash.   Neurological:  Negative for weakness.   Hematological:  Does not bruise/bleed easily.   All other systems reviewed and are negative.       Physical Exam     Initial Vitals [07/07/24 1001]   BP Pulse Resp Temp SpO2   132/77 83 16 98.6 °F (37 °C) 97 %      MAP       --          Physical Exam  Nursing Notes and Vital Signs Reviewed.  Constitutional: Patient is in no acute distress. Well-developed and well-nourished.  Head: Atraumatic. Normocephalic.  Eyes: PERRL. EOM intact. Conjunctivae are not pale. No scleral icterus.  ENT: Mucous membranes are moist. Oropharynx is clear and  symmetric.    Neck: Supple. Full ROM. No lymphadenopathy.  Cardiovascular: Regular rate. Regular rhythm. No murmurs, rubs, or gallops. Distal pulses are 2+ and symmetric.  Pulmonary/Chest: No respiratory distress. Clear to auscultation bilaterally. No wheezing or rales.  Abdominal: Soft and non-distended.  There is no tenderness.  No rebound, guarding, or rigidity. Good bowel sounds.  Genitourinary: No CVA tenderness  Musculoskeletal: Moves all extremities. No obvious deformities. No edema. No calf tenderness.  Skin: Warm and dry.  Neurological:  Alert, awake, and appropriate.  Normal speech.  No acute focal neurological deficits are appreciated.  Psychiatric: Normal affect. Good eye contact. Appropriate in content.     ED Course   Procedures  ED Vital Signs:  Vitals:    07/07/24 1001 07/07/24 1030 07/07/24 1045 07/07/24 1048   BP: 132/77 121/61 (!) 114/56 (!) 119/58   Pulse: 83 79 76 76   Resp: 16 18 15 16   Temp: 98.6 °F (37 °C)      TempSrc: Oral      SpO2: 97% 96% 96% 99%   Weight: 91.3 kg (201 lb 4.5 oz)       07/07/24 1050 07/07/24 1100 07/07/24 1200 07/07/24 1230   BP: 107/60 (!) 121/59 124/62 (!) 148/64   Pulse: 90 70 71 81   Resp: 13 18 18 17   Temp:       TempSrc:       SpO2: 99% 96% 98% 98%   Weight:        07/07/24 1300 07/07/24 1345   BP: 130/61 139/65   Pulse: 70 78   Resp: 18 18   Temp:  98.2 °F (36.8 °C)   TempSrc:  Oral   SpO2: 99% 99%   Weight:         Abnormal Lab Results:  Labs Reviewed   CBC W/ AUTO DIFFERENTIAL - Abnormal; Notable for the following components:       Result Value    Gran # (ANC) 10.7 (*)     Lymph # 0.9 (*)     Gran % 88.0 (*)     Lymph % 7.7 (*)     Mono % 3.6 (*)     All other components within normal limits   COMPREHENSIVE METABOLIC PANEL - Abnormal; Notable for the following components:    CO2 20 (*)     Glucose 164 (*)     All other components within normal limits   HIV 1 / 2 ANTIBODY    Narrative:     Release to patient->Immediate   HEPATITIS C ANTIBODY    Narrative:      Release to patient->Immediate   HEP C VIRUS HOLD SPECIMEN    Narrative:     Release to patient->Immediate   LIPASE   PROTIME-INR   APTT   TYPE & SCREEN        All Lab Results:  Results for orders placed or performed during the hospital encounter of 07/07/24   HIV 1/2 Ag/Ab (4th Gen)   Result Value Ref Range    HIV 1/2 Ag/Ab Negative Negative   Hepatitis C Antibody   Result Value Ref Range    Hepatitis C Ab Negative Negative   HCV Virus Hold Specimen   Result Value Ref Range    HEP C Virus Hold Specimen Hold for HCV sendout    CBC W/ AUTO DIFFERENTIAL   Result Value Ref Range    WBC 12.12 3.90 - 12.70 K/uL    RBC 5.31 4.00 - 5.40 M/uL    Hemoglobin 15.7 12.0 - 16.0 g/dL    Hematocrit 46.9 37.0 - 48.5 %    MCV 88 82 - 98 fL    MCH 29.6 27.0 - 31.0 pg    MCHC 33.5 32.0 - 36.0 g/dL    RDW 12.3 11.5 - 14.5 %    Platelets 228 150 - 450 K/uL    MPV 9.6 9.2 - 12.9 fL    Immature Granulocytes 0.2 0.0 - 0.5 %    Gran # (ANC) 10.7 (H) 1.8 - 7.7 K/uL    Immature Grans (Abs) 0.03 0.00 - 0.04 K/uL    Lymph # 0.9 (L) 1.0 - 4.8 K/uL    Mono # 0.4 0.3 - 1.0 K/uL    Eos # 0.0 0.0 - 0.5 K/uL    Baso # 0.04 0.00 - 0.20 K/uL    nRBC 0 0 /100 WBC    Gran % 88.0 (H) 38.0 - 73.0 %    Lymph % 7.7 (L) 18.0 - 48.0 %    Mono % 3.6 (L) 4.0 - 15.0 %    Eosinophil % 0.2 0.0 - 8.0 %    Basophil % 0.3 0.0 - 1.9 %    Differential Method Automated    Comp. Metabolic Panel   Result Value Ref Range    Sodium 140 136 - 145 mmol/L    Potassium 4.0 3.5 - 5.1 mmol/L    Chloride 109 95 - 110 mmol/L    CO2 20 (L) 23 - 29 mmol/L    Glucose 164 (H) 70 - 110 mg/dL    BUN 14 8 - 23 mg/dL    Creatinine 0.8 0.5 - 1.4 mg/dL    Calcium 9.2 8.7 - 10.5 mg/dL    Total Protein 6.6 6.0 - 8.4 g/dL    Albumin 3.9 3.5 - 5.2 g/dL    Total Bilirubin 0.5 0.1 - 1.0 mg/dL    Alkaline Phosphatase 104 55 - 135 U/L    AST 27 10 - 40 U/L    ALT 44 10 - 44 U/L    eGFR >60 >60 mL/min/1.73 m^2    Anion Gap 11 8 - 16 mmol/L   Lipase   Result Value Ref Range    Lipase 30 4 - 60 U/L    Protime-INR   Result Value Ref Range    Prothrombin Time 10.8 9.0 - 12.5 sec    INR 1.0 0.8 - 1.2   APTT   Result Value Ref Range    aPTT 22.2 21.0 - 32.0 sec   Type & Screen   Result Value Ref Range    Group & Rh B POS     Indirect Sirena NEG     Specimen Outdate 07/10/2024 23:59          Imaging Results:  Imaging Results              CTA Acute GI Colony, Abdomen and Pelvis (Final result)  Result time 07/07/24 13:31:14      Final result by [facility] MD (07/07/24 13:31:14)                   Impression:      No evidence of a active gastrointestinal hemorrhage.  Findings compatible with a extensive inflammatory or infectious colitis.    Incidental gallstone.      Electronically signed by: Hodan Little MD  Date:    07/07/2024  Time:    13:31               Narrative:    EXAMINATION:  CTA ACUTE GI BLEED, ABDOMEN AND PELVIS    CLINICAL HISTORY:  , gastrointestinal hemorrhage    TECHNIQUE:  Non-contrast, postcontrast and postcontrast delayed images were obtained.  Volume coronal and sagittal reconstructions were obtained and permanently archived.  All CT scans at this facility are performed  using dose modulation techniques as appropriate to performed exam including the following:  automated exposure control; adjustment of mA and/or kV according to the patients size (this includes techniques or standardized protocols for targeted exams where dose is matched to indication/reason for exam: i.e. extremities or head);  iterative reconstruction technique.    COMPARISON:  None    FINDINGS:  Vascular: There is no evidence of active gastrointestinal hemorrhage.  Scattered atherosclerosis of the abdominal aorta.  The mesenteric vessels appear unremarkable celiac artery appears unremarkable renal arteries appear unremarkable.    Nonvascular: There is colon wall thickening present involving the CS cecum to the descending colon.  Findings likely related to a diffuse colitis.  Inflammatory changes with mild fluid  is noted related to the right colon which appears most involved.  Fluid is seen around the appendix which appears normal diameter.  Findings not likely to represent appendicitis.  No perforation or abscess.  Small amount of free fluid is in the pelvis.    No focal masses in the liver or spleen.  Pancreas is unremarkable.  Gallstone is present.  No gallbladder wall thickening.  There is a suprapubic hernia containing fat measuring 11 cm.  Adrenal glands appear unremarkable the kidneys appear unremarkable.  The bladder is unremarkable.    The skeleton is intact                                            The Emergency Provider reviewed the vital signs and test results, which are outlined above.     ED Discussion       1:37 PM: Reassessed pt at this time.  Discussed with pt all pertinent ED information and results. Discussed pt dx and plan of tx. Gave pt all f/u and return to the ED instructions. All questions and concerns were addressed at this time. Pt expresses understanding of information and instructions, and is comfortable with plan to discharge. Pt is stable for discharge.    I discussed with patient and/or family/caretaker that evaluation in the ED does not suggest any emergent or life threatening medical conditions requiring immediate intervention beyond what was provided in the ED, and I believe patient is safe for discharge.  Regardless, an unremarkable evaluation in the ED does not preclude the development or presence of a serious of life threatening condition. As such, patient was instructed to return immediately for any worsening or change in current symptoms.         Medical Decision Making  DDX: 1. Diverticulitis 2. Colitis 3. GI Bleed    No tenderness on abd exam, VSS, wbc normal, h/h normal, kidney function normal, no evidence of ongoing or gross bleeding, CTA bleeding protocol done and no obvious source of bleeding, shows colitis, patient appears stable for discharge with po antibiotics, outpatient  follow up with GI, reasons to return given.     Amount and/or Complexity of Data Reviewed  Labs: ordered. Decision-making details documented in ED Course.  Radiology: ordered. Decision-making details documented in ED Course.    Risk  Prescription drug management.                ED Medication(s):  Medications   iohexoL (OMNIPAQUE 350) injection 100 mL (100 mLs Intravenous Given 7/7/24 1210)       Discharge Medication List as of 7/7/2024  1:37 PM        START taking these medications    Details   ciprofloxacin HCl (CIPRO) 500 MG tablet Take 1 tablet (500 mg total) by mouth 2 (two) times daily. for 7 days, Starting Sun 7/7/2024, Until Sun 7/14/2024, Normal      metroNIDAZOLE (FLAGYL) 500 MG tablet Take 1 tablet (500 mg total) by mouth every 12 (twelve) hours. for 7 days, Starting Sun 7/7/2024, Until Sun 7/14/2024, Normal              Follow-up Information       Tito Carbone MD. Schedule an appointment as soon as possible for a visit in 2 days.    Specialty: Internal Medicine  Why: Return to the Emergency Room, If symptoms worsen  Contact information:  [id number]   [facility] [address]  [phone]                                 Scribe Attestation:   Scribe #1: I performed the above scribed service and the documentation accurately describes the services I performed. I attest to the accuracy of the note.     Attending:   Physician Attestation Statement for Scribe #1: I, Saritha Vasques MD, personally performed the services described in this documentation, as scribed by Sidra Masters, in my presence, and it is both accurate and complete.           Clinical Impression       ICD-10-CM ICD-9-CM   1. Colitis  K52.9 558.9   2. History of atrial fibrillation  Z86.79 V12.59   3. Chronic anticoagulation  Z79.01 V58.61       Disposition:   Disposition: Discharged  Condition: Stable         Saritha Vasques MD  07/09/24 [id number]

## 2025-04-29 DIAGNOSIS — I48.0 PAF (PAROXYSMAL ATRIAL FIBRILLATION): ICD-10-CM

## 2025-04-29 DIAGNOSIS — I10 ESSENTIAL HYPERTENSION: ICD-10-CM

## 2025-04-29 DIAGNOSIS — Z78.9 STATIN INTOLERANCE: ICD-10-CM

## 2025-04-29 RX ORDER — APIXABAN 5 MG/1
5 TABLET, FILM COATED ORAL 2 TIMES DAILY
Qty: 180 TABLET | Refills: 3 | OUTPATIENT
Start: 2025-04-29

## 2025-04-29 RX ORDER — METOPROLOL TARTRATE 25 MG/1
12.5 TABLET, FILM COATED ORAL 2 TIMES DAILY
Qty: 90 TABLET | Refills: 3 | OUTPATIENT
Start: 2025-04-29